# Patient Record
Sex: MALE | Race: WHITE | NOT HISPANIC OR LATINO | Employment: OTHER | URBAN - METROPOLITAN AREA
[De-identification: names, ages, dates, MRNs, and addresses within clinical notes are randomized per-mention and may not be internally consistent; named-entity substitution may affect disease eponyms.]

---

## 2022-11-04 ENCOUNTER — TELEPHONE (OUTPATIENT)
Dept: INTERNAL MEDICINE | Facility: CLINIC | Age: 47
End: 2022-11-04

## 2022-11-04 ENCOUNTER — OFFICE VISIT (OUTPATIENT)
Dept: INTERNAL MEDICINE | Facility: CLINIC | Age: 47
End: 2022-11-04
Payer: COMMERCIAL

## 2022-11-04 VITALS — SYSTOLIC BLOOD PRESSURE: 128 MMHG | DIASTOLIC BLOOD PRESSURE: 83 MMHG

## 2022-11-04 DIAGNOSIS — B35.6 TINEA CRURIS: ICD-10-CM

## 2022-11-04 DIAGNOSIS — R79.89 ELEVATED LFTS: Primary | ICD-10-CM

## 2022-11-04 DIAGNOSIS — I10 PRIMARY HYPERTENSION: Primary | ICD-10-CM

## 2022-11-04 DIAGNOSIS — K70.9 ALCOHOLIC LIVER DISEASE: ICD-10-CM

## 2022-11-04 DIAGNOSIS — D53.9 MACROCYTIC ANEMIA: Primary | ICD-10-CM

## 2022-11-04 PROCEDURE — 99203 OFFICE O/P NEW LOW 30 MIN: CPT | Mod: 95,,, | Performed by: INTERNAL MEDICINE

## 2022-11-04 PROCEDURE — 99203 PR OFFICE/OUTPT VISIT, NEW, LEVL III, 30-44 MIN: ICD-10-PCS | Mod: 95,,, | Performed by: INTERNAL MEDICINE

## 2022-11-04 RX ORDER — HYDROCHLOROTHIAZIDE 25 MG/1
25 TABLET ORAL DAILY
Qty: 90 TABLET | Refills: 3 | Status: SHIPPED | OUTPATIENT
Start: 2022-11-04 | End: 2023-02-02

## 2022-11-04 RX ORDER — LISINOPRIL 20 MG/1
20 TABLET ORAL DAILY
COMMUNITY
End: 2022-11-04 | Stop reason: SDUPTHER

## 2022-11-04 RX ORDER — KETOCONAZOLE 20 MG/G
CREAM TOPICAL DAILY
Qty: 60 G | Refills: 1 | Status: SHIPPED | OUTPATIENT
Start: 2022-11-04 | End: 2023-06-16 | Stop reason: SDUPTHER

## 2022-11-04 RX ORDER — METOPROLOL SUCCINATE 50 MG/1
50 TABLET, EXTENDED RELEASE ORAL DAILY
Qty: 90 TABLET | Refills: 3 | Status: SHIPPED | OUTPATIENT
Start: 2022-11-04 | End: 2023-06-09

## 2022-11-04 RX ORDER — HYDROCHLOROTHIAZIDE 25 MG/1
25 TABLET ORAL DAILY
COMMUNITY
End: 2022-11-04 | Stop reason: SDUPTHER

## 2022-11-04 RX ORDER — METOPROLOL TARTRATE 50 MG/1
50 TABLET ORAL 2 TIMES DAILY
COMMUNITY
End: 2022-11-04

## 2022-11-04 RX ORDER — LISINOPRIL 20 MG/1
20 TABLET ORAL DAILY
Qty: 90 TABLET | Refills: 3 | Status: SHIPPED | OUTPATIENT
Start: 2022-11-04 | End: 2023-06-16

## 2022-11-04 NOTE — TELEPHONE ENCOUNTER
Your electrolytes, kidney function, thyroid function are normal.  Cholesterol is elevated.  Regular exercise 5 days a week, 30 minutes a day will help as well as Mediterranean diet.  Also, you need to be on Lipitor 80 mg or Crestor 40 mg, because your LDL is 360.  This presents an increased risk of stroke and heart attack.  Your liver function tests are elevated, but from ureter a facility, I think this is down.  Let us recheck this in 2 weeks.     Mediterranean style diet:     Eat:  Olive oil, lean meats such as chicken and fish and only small servings of carbohydrates.   Olive oil and vinegar instead of low fat salad dressings.  Cook food in olive oil.  You can pan marie or saute fish and vegetables instead of boiling or baking.  Unsalted nuts for snacks. Walnuts, cashews, almonds, pecans and pistachios ( not peanuts). Try almond butter or cashew butter on toast or crackers.  Brown bread. You can also dip bread in olive oil and eat it as a snack or appetizer  Seasonal or frozen vegetables and fruits.      Avoid:  Saturated fats and deep fried foods. Also stay away from large servings of starches, sweets, desserts and sugary drinks (both sodas and fruit juices)

## 2022-11-04 NOTE — PROGRESS NOTES
Subjective:       Patient ID: Duncan Dominguez is a 47 y.o. male.    Chief Complaint: Hepatic Disease    HPI    The patient location is:  Home   The chief complaint leading to consultation is:  Alcoholic liver disease  Total time spent with patient: 15 minutes  Visit type: Virtual visit with synchronous audio and video  Each patient to whom he or she provides medical services by telemedicine is: (1) informed of the relationship between the physician and patient and the respective role of any other health care provider with respect to management of the patient; and (2) notified that he or she may decline to receive medical services by telemedicine and may withdraw from such care at any time.    47-year-old male here for evaluation of alcoholic liver disease.  He reports that he lost his wife and his drinking level escalated significantly in January.  In late January till about 6 weeks ago, he was drinking 750-1000 ml a day.  He went into a detox rehab 6 weeks ago.  Prior to this, he had a hard fall down cement steps.  The right side of his body took a hard hit.  He had labs done at the rehab. His bilirubin levels were at 4 and went to 12 and decreased to 10.4 and the last lab was 7.7.  The color in his eyes is better than it was.    He has a recurring issue with fungus in his groin.    Review of Systems   Constitutional:  Negative for activity change and unexpected weight change.   HENT:  Negative for hearing loss, rhinorrhea and trouble swallowing.    Eyes:  Negative for discharge and visual disturbance.   Respiratory:  Negative for chest tightness and wheezing.    Cardiovascular:  Negative for chest pain and palpitations.   Gastrointestinal:  Negative for blood in stool, constipation, diarrhea and vomiting.   Endocrine: Negative for polydipsia and polyuria.   Genitourinary:  Negative for difficulty urinating, hematuria and urgency.   Musculoskeletal:  Negative for arthralgias, joint swelling and neck pain.    Neurological:  Negative for weakness and headaches.   Psychiatric/Behavioral:  Negative for confusion and dysphoric mood.        Objective:      Physical Exam  Constitutional:       General: He is not in acute distress.     Appearance: He is well-developed. He is not diaphoretic.   HENT:      Head: Normocephalic and atraumatic.      Right Ear: External ear normal.      Left Ear: External ear normal.   Pulmonary:      Effort: Pulmonary effort is normal.   Musculoskeletal:      Cervical back: Normal range of motion.       Assessment:       1. Primary hypertension  - CBC Auto Differential; Future  - Comprehensive Metabolic Panel; Future  - TSH; Future  - Lipid Panel; Future    2. Alcoholic liver disease  - CBC Auto Differential; Future  - Comprehensive Metabolic Panel; Future  - TSH; Future  - Lipid Panel; Future  - US Abdomen Limited; Future  - PROTIME-INR; Future  - GAMMA GT; Future    Plan:       1/2.  Check CBC, CMP, TSH, lipids, GGT, PT, ultrasound abdomen.    [address] [facility]  [place]endoza [id number]

## 2022-11-04 NOTE — TELEPHONE ENCOUNTER
Your blood counts are overall normal, but the size of the blood cells is enlarged.  I am going to order a B12 and folic acid.

## 2022-11-05 ENCOUNTER — PATIENT MESSAGE (OUTPATIENT)
Dept: INTERNAL MEDICINE | Facility: CLINIC | Age: 47
End: 2022-11-05

## 2022-11-07 ENCOUNTER — PATIENT MESSAGE (OUTPATIENT)
Dept: ADMINISTRATIVE | Facility: HOSPITAL | Age: 47
End: 2022-11-07

## 2023-01-05 PROBLEM — K70.30 ALCOHOLIC CIRRHOSIS: Status: ACTIVE | Noted: 2023-01-05

## 2023-01-05 PROBLEM — K85.20 ACUTE ALCOHOLIC PANCREATITIS: Status: ACTIVE | Noted: 2023-01-05

## 2023-01-05 PROBLEM — I10 HTN (HYPERTENSION): Status: ACTIVE | Noted: 2023-01-05

## 2023-01-05 PROBLEM — R80.9 PROTEINURIA: Status: ACTIVE | Noted: 2023-01-05

## 2023-01-05 PROBLEM — K75.81 STEATOHEPATITIS: Status: ACTIVE | Noted: 2023-01-05

## 2023-01-05 PROBLEM — E87.29 ALCOHOLIC KETOACIDOSIS: Status: ACTIVE | Noted: 2023-01-05

## 2023-01-05 PROBLEM — K92.0 HEMATEMESIS OF FRESH BLOOD: Status: ACTIVE | Noted: 2023-01-05

## 2023-01-06 PROBLEM — F10.931 ALCOHOL WITHDRAWAL SYNDROME, WITH DELIRIUM: Status: ACTIVE | Noted: 2023-01-06

## 2023-01-13 PROBLEM — F10.20 ALCOHOL USE DISORDER, SEVERE, DEPENDENCE: Status: ACTIVE | Noted: 2023-01-13

## 2023-01-13 PROBLEM — F33.1 MDD (MAJOR DEPRESSIVE DISORDER), RECURRENT EPISODE, MODERATE: Status: ACTIVE | Noted: 2023-01-13

## 2023-01-13 PROBLEM — G47.00 INSOMNIA: Status: ACTIVE | Noted: 2023-01-13

## 2023-01-14 PROBLEM — R73.9 HYPERGLYCEMIA: Status: ACTIVE | Noted: 2023-01-14

## 2023-01-14 PROBLEM — G47.00 INSOMNIA: Status: RESOLVED | Noted: 2023-01-13 | Resolved: 2023-01-14

## 2023-01-26 ENCOUNTER — OFFICE VISIT (OUTPATIENT)
Dept: PRIMARY CARE CLINIC | Facility: CLINIC | Age: 48
End: 2023-01-26
Payer: COMMERCIAL

## 2023-01-26 VITALS
SYSTOLIC BLOOD PRESSURE: 142 MMHG | BODY MASS INDEX: 29.96 KG/M2 | DIASTOLIC BLOOD PRESSURE: 88 MMHG | RESPIRATION RATE: 17 BRPM | TEMPERATURE: 98 F | OXYGEN SATURATION: 99 % | HEART RATE: 78 BPM | WEIGHT: 246.06 LBS | HEIGHT: 76 IN

## 2023-01-26 DIAGNOSIS — K85.20 ALCOHOL-INDUCED ACUTE PANCREATITIS WITHOUT INFECTION OR NECROSIS: ICD-10-CM

## 2023-01-26 DIAGNOSIS — R73.9 HYPERGLYCEMIA: ICD-10-CM

## 2023-01-26 DIAGNOSIS — K80.21 CALCULUS OF GALLBLADDER WITH BILIARY OBSTRUCTION BUT WITHOUT CHOLECYSTITIS: ICD-10-CM

## 2023-01-26 DIAGNOSIS — J18.9 PNEUMONIA OF LEFT LOWER LOBE DUE TO INFECTIOUS ORGANISM: ICD-10-CM

## 2023-01-26 DIAGNOSIS — R10.13 ABDOMINAL PAIN, EPIGASTRIC: ICD-10-CM

## 2023-01-26 DIAGNOSIS — K80.20 CALCULUS OF GALLBLADDER WITHOUT CHOLECYSTITIS WITHOUT OBSTRUCTION: ICD-10-CM

## 2023-01-26 DIAGNOSIS — K70.30 ALCOHOLIC CIRRHOSIS OF LIVER WITHOUT ASCITES: ICD-10-CM

## 2023-01-26 DIAGNOSIS — K85.90 ACUTE PANCREATITIS, UNSPECIFIED COMPLICATION STATUS, UNSPECIFIED PANCREATITIS TYPE: Primary | ICD-10-CM

## 2023-01-26 PROCEDURE — 3044F HG A1C LEVEL LT 7.0%: CPT | Mod: CPTII,S$GLB,, | Performed by: INTERNAL MEDICINE

## 2023-01-26 PROCEDURE — 1160F RVW MEDS BY RX/DR IN RCRD: CPT | Mod: CPTII,S$GLB,, | Performed by: INTERNAL MEDICINE

## 2023-01-26 PROCEDURE — 1160F PR REVIEW ALL MEDS BY PRESCRIBER/CLIN PHARMACIST DOCUMENTED: ICD-10-PCS | Mod: CPTII,S$GLB,, | Performed by: INTERNAL MEDICINE

## 2023-01-26 PROCEDURE — 3044F PR MOST RECENT HEMOGLOBIN A1C LEVEL <7.0%: ICD-10-PCS | Mod: CPTII,S$GLB,, | Performed by: INTERNAL MEDICINE

## 2023-01-26 PROCEDURE — 99999 PR PBB SHADOW E&M-EST. PATIENT-LVL V: CPT | Mod: PBBFAC,,, | Performed by: INTERNAL MEDICINE

## 2023-01-26 PROCEDURE — 1111F DSCHRG MED/CURRENT MED MERGE: CPT | Mod: CPTII,S$GLB,, | Performed by: INTERNAL MEDICINE

## 2023-01-26 PROCEDURE — 1111F PR DISCHARGE MEDS RECONCILED W/ CURRENT OUTPATIENT MED LIST: ICD-10-PCS | Mod: CPTII,S$GLB,, | Performed by: INTERNAL MEDICINE

## 2023-01-26 PROCEDURE — 3077F PR MOST RECENT SYSTOLIC BLOOD PRESSURE >= 140 MM HG: ICD-10-PCS | Mod: CPTII,S$GLB,, | Performed by: INTERNAL MEDICINE

## 2023-01-26 PROCEDURE — 3008F BODY MASS INDEX DOCD: CPT | Mod: CPTII,S$GLB,, | Performed by: INTERNAL MEDICINE

## 2023-01-26 PROCEDURE — 1159F PR MEDICATION LIST DOCUMENTED IN MEDICAL RECORD: ICD-10-PCS | Mod: CPTII,S$GLB,, | Performed by: INTERNAL MEDICINE

## 2023-01-26 PROCEDURE — 99214 OFFICE O/P EST MOD 30 MIN: CPT | Mod: S$GLB,,, | Performed by: INTERNAL MEDICINE

## 2023-01-26 PROCEDURE — 4010F PR ACE/ARB THEARPY RXD/TAKEN: ICD-10-PCS | Mod: CPTII,S$GLB,, | Performed by: INTERNAL MEDICINE

## 2023-01-26 PROCEDURE — 3079F DIAST BP 80-89 MM HG: CPT | Mod: CPTII,S$GLB,, | Performed by: INTERNAL MEDICINE

## 2023-01-26 PROCEDURE — 99999 PR PBB SHADOW E&M-EST. PATIENT-LVL V: ICD-10-PCS | Mod: PBBFAC,,, | Performed by: INTERNAL MEDICINE

## 2023-01-26 PROCEDURE — 99214 PR OFFICE/OUTPT VISIT, EST, LEVL IV, 30-39 MIN: ICD-10-PCS | Mod: S$GLB,,, | Performed by: INTERNAL MEDICINE

## 2023-01-26 PROCEDURE — 3079F PR MOST RECENT DIASTOLIC BLOOD PRESSURE 80-89 MM HG: ICD-10-PCS | Mod: CPTII,S$GLB,, | Performed by: INTERNAL MEDICINE

## 2023-01-26 PROCEDURE — 4010F ACE/ARB THERAPY RXD/TAKEN: CPT | Mod: CPTII,S$GLB,, | Performed by: INTERNAL MEDICINE

## 2023-01-26 PROCEDURE — 3008F PR BODY MASS INDEX (BMI) DOCUMENTED: ICD-10-PCS | Mod: CPTII,S$GLB,, | Performed by: INTERNAL MEDICINE

## 2023-01-26 PROCEDURE — 3077F SYST BP >= 140 MM HG: CPT | Mod: CPTII,S$GLB,, | Performed by: INTERNAL MEDICINE

## 2023-01-26 PROCEDURE — 1159F MED LIST DOCD IN RCRD: CPT | Mod: CPTII,S$GLB,, | Performed by: INTERNAL MEDICINE

## 2023-01-26 RX ORDER — INSULIN LISPRO 100 [IU]/ML
INJECTION, SOLUTION INTRAVENOUS; SUBCUTANEOUS
Qty: 9 ML | Refills: 3 | Status: SHIPPED | OUTPATIENT
Start: 2023-01-26 | End: 2023-06-16

## 2023-01-26 RX ORDER — NAPROXEN SODIUM 220 MG
TABLET ORAL
Qty: 100 EACH | Refills: 3 | Status: ON HOLD | OUTPATIENT
Start: 2023-01-26 | End: 2023-11-06 | Stop reason: CLARIF

## 2023-01-26 RX ORDER — PANTOPRAZOLE SODIUM 40 MG/1
40 TABLET, DELAYED RELEASE ORAL DAILY
Qty: 30 TABLET | Refills: 2 | Status: SHIPPED | OUTPATIENT
Start: 2023-01-26 | End: 2023-06-16

## 2023-01-27 ENCOUNTER — TELEPHONE (OUTPATIENT)
Dept: ENDOSCOPY | Facility: HOSPITAL | Age: 48
End: 2023-01-27
Payer: COMMERCIAL

## 2023-01-27 NOTE — PROGRESS NOTES
Subjective:       Patient ID: Alberto Song is a 47 y.o. male.    Chief Complaint: Hospital Follow Up, Medication Refill (Needs Humalog instead of Novolog; and needles for insulin pen), and Chest Pain (Lower chest/abdominal pain; suspects from antibiotic)    HPI  patient finished today for hospital follow-up he was admitted to the hospital due to intractable nausea vomiting hematemesis and ataxia he went to ER file to have severe hypokalemia metabolic acidosis hand very high lipase greater than 1000 patient has history of alcohol use he quit 2 day before his abdominal pain and nausea vomiting started he was treated with IV fluid IV antibiotic and medication for nausea vomiting he had CT scan of the abdomen and abdominal ultrasound that show acute pancreatitis with inflammation swelling of the pancreas but no pseudocyst no bleeding and no bladder with cholecystitis like higher cyst single stone at the neck of the bladder with dilated gallbladder MRCP appear to be okay his lipase return to almost normal patient tolerated p.o. diet he also found to have questionable lower lobe pneumonia the left lower lung he put on Levaquin p.o. when he was discharged he has been doing fairly well home able to tolerate p.o. diet a few day he had been having abdominal pain after he take medication including Levaquin lisinopril metoprolol and the pain then resolved by itself is start in the epigastric area and radiated into the lower abdomen no nausea vomiting no melanotic stool no diarrhea .  His still have some coughing but no fever no short of breath patient is not a smoker  Review of Systems    Objective:      Physical Exam  Vitals and nursing note reviewed.   Constitutional:       General: He is not in acute distress.     Appearance: He is well-developed.   HENT:      Head: Normocephalic and atraumatic.      Right Ear: External ear normal.      Left Ear: External ear normal.      Nose: Nose normal.      Mouth/Throat:      Pharynx:  No oropharyngeal exudate.   Eyes:      Extraocular Movements: Extraocular movements intact.      Conjunctiva/sclera: Conjunctivae normal.      Pupils: Pupils are equal, round, and reactive to light.   Neck:      Thyroid: No thyromegaly.   Cardiovascular:      Rate and Rhythm: Normal rate and regular rhythm.      Heart sounds: Normal heart sounds. No murmur heard.    No friction rub. No gallop.   Pulmonary:      Effort: Pulmonary effort is normal. No respiratory distress.      Breath sounds: Normal breath sounds. No wheezing.   Abdominal:      General: Bowel sounds are normal. There is no distension.      Palpations: Abdomen is soft.      Tenderness: There is no abdominal tenderness.   Musculoskeletal:         General: No tenderness or deformity. Normal range of motion.      Cervical back: Normal range of motion and neck supple.   Lymphadenopathy:      Cervical: No cervical adenopathy.   Skin:     General: Skin is warm and dry.      Findings: No erythema or rash.   Neurological:      Mental Status: He is alert and oriented to person, place, and time.   Psychiatric:         Mood and Affect: Mood normal.         Thought Content: Thought content normal.         Judgment: Judgment normal.       Assessment:       1. Acute pancreatitis, unspecified complication status, unspecified pancreatitis type    2. Abdominal pain, epigastric    3. Alcoholic cirrhosis of liver without ascites    4. Alcohol-induced acute pancreatitis without infection or necrosis    5. Hyperglycemia    6. Calculus of gallbladder with biliary obstruction but without cholecystitis    7. Pneumonia of left lower lobe due to infectious organism    8. Calculus of gallbladder without cholecystitis without obstruction          Plan:       Acute pancreatitis, unspecified complication status, unspecified pancreatitis type  -     Ambulatory referral/consult to Gastroenterology; Future; Expected date: 01/27/2023  -     Lipase; Future; Expected date: 01/26/2023  -   "   AMYLASE; Future; Expected date: 01/26/2023  -     X-Ray Abdomen Flat And Erect; Future; Expected date: 01/26/2023    Abdominal pain, epigastric  Comments:  Could be from gallstones versus medication versus gastritis  Orders:  -     X-Ray Abdomen Flat And Erect; Future; Expected date: 01/26/2023  -     Urinalysis; Future; Expected date: 01/26/2023  -     pantoprazole (PROTONIX) 40 MG tablet; Take 1 tablet (40 mg total) by mouth once daily.  Dispense: 30 tablet; Refill: 2    Alcoholic cirrhosis of liver without ascites    Alcohol-induced acute pancreatitis without infection or necrosis  Comments:  Continue to abstinent from alcohol    Hyperglycemia  -     insulin detemir U-100 (LEVEMIR FLEXTOUCH) 100 unit/mL (3 mL) SubQ InPn pen; Inject 20 Units into the skin once daily.  Dispense: 6 mL; Refill: 11  -     insulin lispro (HUMALOG KWIKPEN INSULIN) 100 unit/mL pen; 8 units TID before meal  Dispense: 9 mL; Refill: 3  -     insulin lispro (HUMALOG KWIKPEN INSULIN) 100 unit/mL pen; 1-10 units before meal and at night  Dispense: 9 mL; Refill: 3  -     insulin syringe-needle U-100 0.5 mL 31 gauge x 5/16" Syrg; Insulin infection 5 x a day  Dispense: 100 each; Refill: 3    Calculus of gallbladder with biliary obstruction but without cholecystitis  -     Ambulatory referral/consult to Gastroenterology; Future; Expected date: 01/27/2023    Pneumonia of left lower lobe due to infectious organism  -     CBC Auto Differential; Future; Expected date: 01/26/2023  -     Comprehensive Metabolic Panel; Future; Expected date: 01/26/2023  -     Procalcitonin; Future; Expected date: 01/26/2023    Calculus of gallbladder without cholecystitis without obstruction  Comments:  If not fasting will need surgical intervention        Medication List with Changes/Refills   New Medications    INSULIN LISPRO (HUMALOG KWIKPEN INSULIN) 100 UNIT/ML PEN    8 units TID before meal    INSULIN LISPRO (HUMALOG KWIKPEN INSULIN) 100 UNIT/ML PEN    1-10 units " "before meal and at night    INSULIN SYRINGE-NEEDLE U-100 0.5 ML 31 GAUGE X 5/16" SYRG    Insulin infection 5 x a day    PANTOPRAZOLE (PROTONIX) 40 MG TABLET    Take 1 tablet (40 mg total) by mouth once daily.   Current Medications    BLOOD SUGAR DIAGNOSTIC STRP    1 strip by Misc.(Non-Drug; Combo Route) route 2 (two) times daily.    BLOOD-GLUCOSE METER KIT    Use as instructed    LEVOFLOXACIN (LEVAQUIN) 500 MG TABLET    Take 1 tablet (500 mg total) by mouth once daily.    LISINOPRIL (PRINIVIL,ZESTRIL) 20 MG TABLET    Take 20 mg by mouth.    METOPROLOL SUCCINATE (TOPROL-XL) 25 MG 24 HR TABLET    Take 25 mg by mouth.    SERTRALINE (ZOLOFT) 25 MG TABLET    Take 1 tablet (25 mg total) by mouth once daily.   Changed and/or Refilled Medications    Modified Medication Previous Medication    INSULIN DETEMIR U-100 (LEVEMIR FLEXTOUCH) 100 UNIT/ML (3 ML) SUBQ INPN PEN insulin detemir U-100 (LEVEMIR FLEXTOUCH) 100 unit/mL (3 mL) SubQ InPn pen       Inject 20 Units into the skin once daily.    Inject 20 Units into the skin once daily.   Discontinued Medications    INSULIN ASPART U-100 (NOVOLOG) 100 UNIT/ML (3 ML) INPN PEN    Inject 1-10 Units into the skin before meals and at bedtime as needed (Hyperglycemia).    INSULIN ASPART U-100 (NOVOLOG) 100 UNIT/ML (3 ML) INPN PEN    Inject 8 Units into the skin 3 (three) times daily.        "

## 2023-01-27 NOTE — TELEPHONE ENCOUNTER
----- Message from Jordi Hernandez MD sent at 1/27/2023  7:41 AM CST -----  yes  ----- Message -----  From: Keyanna Kirby MA  Sent: 1/26/2023   8:45 PM CST  To: Jordi Hernandez MD    Should he just see General Surg?  ----- Message -----  From: Sara Mcleod  Sent: 1/26/2023   3:51 PM CST  To: [facility]astro Clinical Staff    Acute pancreatitis, unspecified complication status, unspecified pancreatitis ty...  Calculus of gallbladder with biliary obstruction but without cholecystitis [[id number]....plrase call patient back to schedule appointment

## 2023-01-30 ENCOUNTER — OFFICE VISIT (OUTPATIENT)
Dept: SURGERY | Facility: CLINIC | Age: 48
End: 2023-01-30
Payer: COMMERCIAL

## 2023-01-30 VITALS
WEIGHT: 241.94 LBS | HEART RATE: 106 BPM | HEIGHT: 76 IN | SYSTOLIC BLOOD PRESSURE: 137 MMHG | BODY MASS INDEX: 29.46 KG/M2 | DIASTOLIC BLOOD PRESSURE: 90 MMHG | RESPIRATION RATE: 16 BRPM

## 2023-01-30 DIAGNOSIS — K80.20 CALCULUS OF GALLBLADDER WITHOUT CHOLECYSTITIS WITHOUT OBSTRUCTION: Primary | ICD-10-CM

## 2023-01-30 PROCEDURE — 3008F PR BODY MASS INDEX (BMI) DOCUMENTED: ICD-10-PCS | Mod: CPTII,S$GLB,, | Performed by: SURGERY

## 2023-01-30 PROCEDURE — 3080F DIAST BP >= 90 MM HG: CPT | Mod: CPTII,S$GLB,, | Performed by: SURGERY

## 2023-01-30 PROCEDURE — 99999 PR PBB SHADOW E&M-EST. PATIENT-LVL III: CPT | Mod: PBBFAC,,, | Performed by: SURGERY

## 2023-01-30 PROCEDURE — 4010F ACE/ARB THERAPY RXD/TAKEN: CPT | Mod: CPTII,S$GLB,, | Performed by: SURGERY

## 2023-01-30 PROCEDURE — 3075F SYST BP GE 130 - 139MM HG: CPT | Mod: CPTII,S$GLB,, | Performed by: SURGERY

## 2023-01-30 PROCEDURE — 99215 PR OFFICE/OUTPT VISIT, EST, LEVL V, 40-54 MIN: ICD-10-PCS | Mod: S$GLB,,, | Performed by: SURGERY

## 2023-01-30 PROCEDURE — 3075F PR MOST RECENT SYSTOLIC BLOOD PRESS GE 130-139MM HG: ICD-10-PCS | Mod: CPTII,S$GLB,, | Performed by: SURGERY

## 2023-01-30 PROCEDURE — 4010F PR ACE/ARB THEARPY RXD/TAKEN: ICD-10-PCS | Mod: CPTII,S$GLB,, | Performed by: SURGERY

## 2023-01-30 PROCEDURE — 1111F PR DISCHARGE MEDS RECONCILED W/ CURRENT OUTPATIENT MED LIST: ICD-10-PCS | Mod: CPTII,S$GLB,, | Performed by: SURGERY

## 2023-01-30 PROCEDURE — 1159F MED LIST DOCD IN RCRD: CPT | Mod: CPTII,S$GLB,, | Performed by: SURGERY

## 2023-01-30 PROCEDURE — 3044F HG A1C LEVEL LT 7.0%: CPT | Mod: CPTII,S$GLB,, | Performed by: SURGERY

## 2023-01-30 PROCEDURE — 3044F PR MOST RECENT HEMOGLOBIN A1C LEVEL <7.0%: ICD-10-PCS | Mod: CPTII,S$GLB,, | Performed by: SURGERY

## 2023-01-30 PROCEDURE — 99999 PR PBB SHADOW E&M-EST. PATIENT-LVL III: ICD-10-PCS | Mod: PBBFAC,,, | Performed by: SURGERY

## 2023-01-30 PROCEDURE — 1159F PR MEDICATION LIST DOCUMENTED IN MEDICAL RECORD: ICD-10-PCS | Mod: CPTII,S$GLB,, | Performed by: SURGERY

## 2023-01-30 PROCEDURE — 3008F BODY MASS INDEX DOCD: CPT | Mod: CPTII,S$GLB,, | Performed by: SURGERY

## 2023-01-30 PROCEDURE — 3080F PR MOST RECENT DIASTOLIC BLOOD PRESSURE >= 90 MM HG: ICD-10-PCS | Mod: CPTII,S$GLB,, | Performed by: SURGERY

## 2023-01-30 PROCEDURE — 99215 OFFICE O/P EST HI 40 MIN: CPT | Mod: S$GLB,,, | Performed by: SURGERY

## 2023-01-30 PROCEDURE — 1111F DSCHRG MED/CURRENT MED MERGE: CPT | Mod: CPTII,S$GLB,, | Performed by: SURGERY

## 2023-01-31 PROBLEM — K70.9 ALCOHOLIC LIVER DISEASE: Status: ACTIVE | Noted: 2023-01-31

## 2023-02-01 ENCOUNTER — PATIENT MESSAGE (OUTPATIENT)
Dept: SURGERY | Facility: CLINIC | Age: 48
End: 2023-02-01
Payer: COMMERCIAL

## 2023-02-01 ENCOUNTER — TELEPHONE (OUTPATIENT)
Dept: SURGERY | Facility: CLINIC | Age: 48
End: 2023-02-01
Payer: COMMERCIAL

## 2023-02-01 NOTE — TELEPHONE ENCOUNTER
----- Message from Carol Sánchez sent at 2/1/2023  2:14 PM CST -----  Regarding: Pt Call Back  Pt called stating that he would like to speak with someone in the office in regards to scheduling surgery       Contact Alberto [phone]

## 2023-02-02 ENCOUNTER — PATIENT MESSAGE (OUTPATIENT)
Dept: SURGERY | Facility: CLINIC | Age: 48
End: 2023-02-02
Payer: COMMERCIAL

## 2023-02-03 RX ORDER — ENOXAPARIN SODIUM 100 MG/ML
40 INJECTION SUBCUTANEOUS
Status: CANCELLED | OUTPATIENT
Start: 2023-02-08

## 2023-02-03 NOTE — PROGRESS NOTES
"History & Physical    SUBJECTIVE:     History of Present Illness:  7-year-old male with history of chronic alcoholism who presents with hospital follow-up from an episode of pancreatitis 3 weeks ago likely caused by drinking alcohol.    He was also found to have a large stone at the neck of the gallbladder.    Patient states he feels better now and has stopped drinking over the past few weeks.        No nausea, vomiting and epigastric pain over the last few weeks  Discussed with patient surgical intervention with laparoscopic cholecystectomy with him and his father.    Went over in detail risks and benefits of surgical intervention versus observation and need to cut down his drinking.    Expressed understanding.          Review of patient's allergies indicates:  No Known Allergies    Current Outpatient Medications   Medication Sig Dispense Refill    blood sugar diagnostic Strp 1 strip by Misc.(Non-Drug; Combo Route) route 2 (two) times daily. 100 strip 0    blood-glucose meter kit Use as instructed 1 each 0    insulin detemir U-100 (LEVEMIR FLEXTOUCH) 100 unit/mL (3 mL) SubQ InPn pen Inject 20 Units into the skin once daily. 6 mL 11    insulin lispro (HUMALOG KWIKPEN INSULIN) 100 unit/mL pen 1-10 units before meal and at night 9 mL 3    insulin syringe-needle U-100 0.5 mL 31 gauge x 5/16" Syrg Insulin infection 5 x a day 100 each 3    lisinopriL (PRINIVIL,ZESTRIL) 20 MG tablet Take 20 mg by mouth.      metoprolol succinate (TOPROL-XL) 25 MG 24 hr tablet Take 25 mg by mouth.      insulin lispro (HUMALOG KWIKPEN INSULIN) 100 unit/mL pen 8 units TID before meal (Patient not taking: Reported on 1/30/2023) 9 mL 3    levoFLOXacin (LEVAQUIN) 500 MG tablet Take 1 tablet (500 mg total) by mouth once daily. (Patient not taking: Reported on 1/30/2023) 12 tablet 0    pantoprazole (PROTONIX) 40 MG tablet Take 1 tablet (40 mg total) by mouth once daily. (Patient not taking: Reported on 1/30/2023) 30 tablet 2    sertraline " "(ZOLOFT) 25 MG tablet Take 1 tablet (25 mg total) by mouth once daily. (Patient not taking: Reported on 1/30/2023) 30 tablet 11     No current facility-administered medications for this visit.       Past Medical History:   Diagnosis Date    Asthma     Hypertension      No past surgical history on file.  Family History   Problem Relation Age of Onset    Cancer Mother      Social History     Tobacco Use    Smoking status: Every Day     Packs/day: 1.00     Types: Cigarettes    Smokeless tobacco: Never   Substance Use Topics    Alcohol use: Not Currently     Comment: 750    Drug use: Never        Review of Systems:  Review of Systems   Constitutional:  Negative for appetite change, fatigue, fever and unexpected weight change.   HENT:  Negative for sore throat and trouble swallowing.    Eyes: Negative.    Respiratory:  Negative for cough, shortness of breath and wheezing.    Cardiovascular:  Negative for chest pain and leg swelling.   Gastrointestinal:  Negative for abdominal distention, abdominal pain, blood in stool, constipation, diarrhea, nausea and vomiting.   Endocrine: Negative.    Genitourinary: Negative.    Musculoskeletal:  Negative for back pain.   Skin: Negative.  Negative for rash.   Allergic/Immunologic: Negative.    Neurological: Negative.    Hematological: Negative.    Psychiatric/Behavioral:  Negative for confusion.      OBJECTIVE:     Vital Signs (Most Recent)  Pulse: 106 (01/30/23 1126)  Resp: 16 (01/30/23 1126)  BP: (!) 137/90 (01/30/23 1126)  6' 4" (1.93 m)  109.8 kg (241 lb 15.3 oz)     Physical Exam:  Physical Exam  Vitals and nursing note reviewed.   Constitutional:       Appearance: He is well-developed.   HENT:      Head: Normocephalic and atraumatic.   Cardiovascular:      Rate and Rhythm: Normal rate.      Heart sounds: Normal heart sounds.   Pulmonary:      Effort: Pulmonary effort is normal.   Abdominal:      General: Bowel sounds are normal. There is no distension.      Palpations: Abdomen " is soft.      Tenderness: There is no abdominal tenderness.   Musculoskeletal:         General: Normal range of motion.      Cervical back: Normal range of motion.   Skin:     General: Skin is warm and dry.      Capillary Refill: Capillary refill takes less than 2 seconds.   Neurological:      Mental Status: He is alert and oriented to person, place, and time.   Psychiatric:         Behavior: Behavior normal.     Laboratory  CBC: Reviewed  CMP: Reviewed    Diagnostic Results:  US: Reviewed  CT: Reviewed  Cholelithiasis, pancreatitis    ASSESSMENT/PLAN:     47-year-old male with symptomatic cholelithiasis, recent episode of pancreatitis    PLAN:Plan   Recommend laparoscopic cholecystectomy      I described the nature of the patient's pathology and the spectrum of disease presentation ranging from mild discomfort to acute cholecystitis or gallstone/necrotizing pancreatitis. Non-operative therapy was discussed, but the patient would require a strict non-fat diet and still this would not guarantee resolution of symptoms. I think surgery is in the patient's best interest given the severity of symptoms, and he is agreeable. I described the risks of the surgery including but not limited to bleeding, infection, wound complications, injury to local structures including the common bile duct, bile leak, persistent abd pain, and potential need for further interventions. The patient demonstrated understanding of these risks and asked appropriate questions. A consent form was signed today, and the patient will be booked for surgery as laparoscopic cholecystectomy, possible open, possible intraoperative cholangiogram.

## 2023-02-06 DIAGNOSIS — E11.9 NEW ONSET TYPE 2 DIABETES MELLITUS: Primary | ICD-10-CM

## 2023-02-06 RX ORDER — PEN NEEDLE, DIABETIC 29 G X1/2"
1 NEEDLE, DISPOSABLE MISCELLANEOUS 4 TIMES DAILY
Qty: 100 EACH | Refills: 20 | Status: ON HOLD | OUTPATIENT
Start: 2023-02-06 | End: 2023-11-06 | Stop reason: CLARIF

## 2023-02-08 PROBLEM — T88.4XXA HARD TO INTUBATE: Status: ACTIVE | Noted: 2023-02-08

## 2023-02-14 ENCOUNTER — OFFICE VISIT (OUTPATIENT)
Dept: ENDOCRINOLOGY | Facility: CLINIC | Age: 48
End: 2023-02-14
Payer: COMMERCIAL

## 2023-02-14 ENCOUNTER — PATIENT MESSAGE (OUTPATIENT)
Dept: PRIMARY CARE CLINIC | Facility: CLINIC | Age: 48
End: 2023-02-14
Payer: COMMERCIAL

## 2023-02-14 DIAGNOSIS — R73.9 HYPERGLYCEMIA: ICD-10-CM

## 2023-02-14 DIAGNOSIS — K85.20 ALCOHOL-INDUCED ACUTE PANCREATITIS WITHOUT INFECTION OR NECROSIS: ICD-10-CM

## 2023-02-14 PROCEDURE — 3044F HG A1C LEVEL LT 7.0%: CPT | Mod: CPTII,95,, | Performed by: INTERNAL MEDICINE

## 2023-02-14 PROCEDURE — 4010F ACE/ARB THERAPY RXD/TAKEN: CPT | Mod: CPTII,95,, | Performed by: INTERNAL MEDICINE

## 2023-02-14 PROCEDURE — 99204 PR OFFICE/OUTPT VISIT, NEW, LEVL IV, 45-59 MIN: ICD-10-PCS | Mod: 95,,, | Performed by: INTERNAL MEDICINE

## 2023-02-14 PROCEDURE — 3044F PR MOST RECENT HEMOGLOBIN A1C LEVEL <7.0%: ICD-10-PCS | Mod: CPTII,95,, | Performed by: INTERNAL MEDICINE

## 2023-02-14 PROCEDURE — 4010F PR ACE/ARB THEARPY RXD/TAKEN: ICD-10-PCS | Mod: CPTII,95,, | Performed by: INTERNAL MEDICINE

## 2023-02-14 PROCEDURE — 99204 OFFICE O/P NEW MOD 45 MIN: CPT | Mod: 95,,, | Performed by: INTERNAL MEDICINE

## 2023-02-14 RX ORDER — METOPROLOL SUCCINATE 25 MG/1
25 TABLET, EXTENDED RELEASE ORAL DAILY
Qty: 90 TABLET | Refills: 1 | Status: SHIPPED | OUTPATIENT
Start: 2023-02-14 | End: 2023-06-08 | Stop reason: SDUPTHER

## 2023-02-14 RX ORDER — LISINOPRIL 20 MG/1
20 TABLET ORAL DAILY
Qty: 90 TABLET | Refills: 1 | Status: SHIPPED | OUTPATIENT
Start: 2023-02-14 | End: 2023-06-08 | Stop reason: SDUPTHER

## 2023-02-14 NOTE — PROGRESS NOTES
Subjective:      Patient ID: Alberto Song is a 47 y.o. male.    Chief Complaint:  No chief complaint on file.      History of Present Illness    Recent discharge from [facility] for acute alcoholic/gallstone pancreatitis underwent cholecystectomy last week.   Discharged on [facility] and is here for a follow up visit.     Since his d/c:  1) avoiding sodas, carbs and sugars, avoiding fruits as well  2) eating protein and vegetables only   3) weight loss 40 lbs   4) improved in blood sugars based on diet: from 300 --> 120 - 170  5) checking blood sugars before and after meals.     Current regimen:   Levemir 11 units in the morning once daily.   Not taking any meal time insulin     Did not send log.     Denies hypoglycemic symptoms.   Lowest blood sugar is 116.     PMHx:  Hypertension   Cholecystectomy  Hepatomegaly with steatosis     Review of Systems  Denies any recent GI symptoms mild tenderness at incision sites  Feeling really well after surgery.   Denies fever or chills    Objective:   Physical Exam  There were no vitals filed for this visit.    BP Readings from Last 3 Encounters:   02/08/23 138/75   01/30/23 (!) 137/90   01/26/23 (!) 142/88     Wt Readings from Last 1 Encounters:   02/08/23 0901 108 kg (238 lb 3.3 oz)   02/06/23 1002 108.9 kg (240 lb)         There is no height or weight on file to calculate BMI.    Lab Review:   Lab Results   Component Value Date    HGBA1C 5.7 (H) 01/10/2023     No results found for: CHOL, HDL, LDLCALC, TRIG, CHOLHDL  Lab Results   Component Value Date     01/26/2023    K 3.6 01/26/2023     01/26/2023    CO2 23 01/26/2023     (H) 01/26/2023    BUN 7 01/26/2023    CREATININE 0.9 01/26/2023    CALCIUM 9.0 01/26/2023    PROT 6.9 01/26/2023    ALBUMIN 3.4 (L) 01/26/2023    BILITOT 0.5 01/26/2023    ALKPHOS 119 01/26/2023    AST 21 01/26/2023    ALT 27 01/26/2023    ANIONGAP 13 01/26/2023     CT of abdomen 2023   right adrenal gland appear unremarkable.   Hyperplastic changes of the left adrenal gland medial limb and body are noted.    Assessment and Plan     Hyperglycemia  In acute setting   Currently on Levemir 11 units daily   Strict diet without carbs     Check cpeptide and glucose levels next week     Acute alcoholic pancreatitis  In the acute setting most likely the cause of hyperglycemia   On day of admission A1C is 5.7%      The patient location is: home  The chief complaint leading to consultation is: hyperglycemia    Visit type: audiovisual    Face to Face time with patient: 20 minutes of total time spent othe encounter, which includes face to face time and non-face to face time preparing to see the patient (eg, review of tests), Obtaining and/or reviewing separately obtained history, Documenting clinical information in the electronic or other health record, Independently interpreting results (not separately reported) and communicating results to the patient/family/caregiver, or Care coordination (not separately reported).         Each patient to whom he or she provides medical services by telemedicine is:  (1) informed of the relationship between the physician and patient and the respective role of any other health care provider with respect to management of the patient; and (2) notified that he or she may decline to receive medical services by telemedicine and may withdraw from such care at any time.    Notes:

## 2023-02-14 NOTE — ASSESSMENT & PLAN NOTE
In acute setting   Currently on Levemir 11 units daily   Strict diet without carbs     Check cpeptide and glucose levels next week

## 2023-02-16 ENCOUNTER — PATIENT MESSAGE (OUTPATIENT)
Dept: ENDOCRINOLOGY | Facility: CLINIC | Age: 48
End: 2023-02-16
Payer: COMMERCIAL

## 2023-02-24 ENCOUNTER — PATIENT MESSAGE (OUTPATIENT)
Dept: ENDOCRINOLOGY | Facility: CLINIC | Age: 48
End: 2023-02-24
Payer: COMMERCIAL

## 2023-03-20 DIAGNOSIS — R73.9 HYPERGLYCEMIA: Primary | ICD-10-CM

## 2023-04-09 ENCOUNTER — PATIENT MESSAGE (OUTPATIENT)
Dept: PRIMARY CARE CLINIC | Facility: CLINIC | Age: 48
End: 2023-04-09
Payer: COMMERCIAL

## 2023-04-09 ENCOUNTER — PATIENT MESSAGE (OUTPATIENT)
Dept: ENDOCRINOLOGY | Facility: CLINIC | Age: 48
End: 2023-04-09
Payer: COMMERCIAL

## 2023-04-10 ENCOUNTER — PATIENT MESSAGE (OUTPATIENT)
Dept: PRIMARY CARE CLINIC | Facility: CLINIC | Age: 48
End: 2023-04-10
Payer: COMMERCIAL

## 2023-04-17 PROBLEM — K92.0 HEMATEMESIS OF FRESH BLOOD: Status: RESOLVED | Noted: 2023-01-05 | Resolved: 2023-04-17

## 2023-06-04 NOTE — PROGRESS NOTES
Endocrinology Return Visit  The patient location is: [place]   The chief complaint leading to consultation is: T2DM    Visit type: audiovisual    Face to Face time with patient: 30  45 minutes of total time spent on the encounter, which includes face to face time and non-face to face time preparing to see the patient (eg, review of tests), Obtaining and/or reviewing separately obtained history, Documenting clinical information in the electronic or other health record, Independently interpreting results (not separately reported) and communicating results to the patient/family/caregiver, or Care coordination (not separately reported).     Each patient to whom he or she provides medical services by telemedicine is:  (1) informed of the relationship between the physician and patient and the respective role of any other health care provider with respect to management of the patient; and (2) notified that he or she may decline to receive medical services by telemedicine and may withdraw from such care at any time.  Subjective:      Patient ID: Alberto Song is a 47 y.o. male.    Chief Complaint:  Diabetes      History of Present Illness    Medical dx include T2DM, HTN, MDD, EtOH cirrhosis. He has a hx of EtOH/gallstone pancreatitis and is s/p cholecystectomy in 2/2023.    Last seen by Dr Cardenas on 2/14/2023.    Diet much better lately and had weaned himself off insulin with strict dietary changes. BG better even without insulin briefly, until last week when his dad was visiting and he did not stick with strict diet.      T2DM  Diagnosed in 1/2023 when he was admitted for EtOH/Gallstone pancreatitis. Discharged on MDI due to hyperglycemia inpatient despite a1c <6. C-peptide 3.87 with  when seen outpatient in 2/2023.    Known complications: hyperglycemia    Current Diabetes Regimen:  Levemir 20 units in the morning once daily.   Humalog 6u AC    Omitted doses: denies     Recently restarted insulin. He had weaned  himself off using lifestyle/diet changes but restarted based on hyperglycemia when not following strict diet due to a family member in town visiting.    Prior mediations tried:  Prandial insulin - stopped due to improved BG with lifestyle/diet changes     Diet/Exercise:  Since his d/c in 2/2023:  1) avoiding sodas, carbs and sugars, avoiding fruits as well  2) eating protein and vegetables only   3) weight loss 40 lbs   4) improved in blood sugars based on diet: from 300 --> 120 - 170  5) checking blood sugars before and after meals.     Recent Hgb A1C:  Lab Results   Component Value Date    HGBA1C 5.7 (H) 01/10/2023       Glucose Monitoring:  Fingersticks 3-4x/day     Hypoglycemic Episodes:   Denies hypoglycemic symptoms.     Screening / DM Complications:    Nephropathy:  ACEi/ARB: Not taking  No results found for: MICALBCREAT    Last Lipid Panel:  Statin: Not taking  Lab Results   Component Value Date    LDLCALC 359.0 (H) 11/04/2022       Last foot exam Most Recent Foot Exam Date: Not Found  Last eye exam Most Recent Eye Exam Date: Not Found;  no laser surgery or DR    B12:  No results found for: [id number]        ROS: as above    Objective:   Physical Exam  Constitutional:       General: He is not in acute distress.  Neurological:      Mental Status: He is alert and oriented to person, place, and time.   Psychiatric:         Mood and Affect: Mood normal.         Behavior: Behavior normal.     There were no vitals filed for this visit.    BP Readings from Last 3 Encounters:   02/08/23 138/75   01/30/23 (!) 137/90   01/26/23 (!) 142/88     Wt Readings from Last 1 Encounters:   02/08/23 0901 108 kg (238 lb 3.3 oz)   02/06/23 1002 108.9 kg (240 lb)       There is no height or weight on file to calculate BMI.    Lab Review:   Lab Results   Component Value Date    HGBA1C 5.7 (H) 01/10/2023     Lab Results   Component Value Date    CHOL 433 (H) 11/04/2022    HDL 27 (L) 11/04/2022    LDLCALC 359.0 (H) 11/04/2022    TRIG  235 (H) 11/04/2022    CHOLHDL 6.2 (L) 11/04/2022     Lab Results   Component Value Date     01/26/2023    K 3.6 01/26/2023     01/26/2023    CO2 23 01/26/2023     (H) 02/23/2023    BUN 7 01/26/2023    CREATININE 0.9 01/26/2023    CALCIUM 9.0 01/26/2023    PROT 6.9 01/26/2023    ALBUMIN 3.4 (L) 01/26/2023    BILITOT 0.5 01/26/2023    ALKPHOS 119 01/26/2023    AST 21 01/26/2023    ALT 27 01/26/2023    ANIONGAP 13 01/26/2023    TSH 0.525 11/04/2022     CT of abdomen 2023   right adrenal gland appear unremarkable.  Hyperplastic changes of the left adrenal gland medial limb and body are noted.    Assessment and Plan     History of pancreatitis  Hx of pancreatitis in early 2023 2/2 EtOH and gallstones, now s/p cholecystectomy in 2/2023  Avoid GLP1 agonist and DPP4i given hx of pancreatitis     Hyperlipidemia  Significantly elevated LDL on lipid panel in 11/2022 - this was shortly before admission for pancreatitis  Will recheck fasting lipid panel now   Plan to start Crestor regardless but will await repeat labs to determine starting dose      Type 2 diabetes mellitus without complication, without long-term current use of insulin  No prior known dx of DM until admitted for pancreatitis early 2023. Significant hyperglycemia during that admission and discharged on MDI  Since then he has self weaned insulin using strict diet and lifestyle changes  Restarted insulin last week due to his father being in town and not adhering to diet  Current insulin doses are Levemir 20u qd and Humalog 6u with meals  Given his hx of pancreatitis - will avoid GLP1 agonist and DPP4i. Will begin Metformin - discussed how to slowly increase metformin dose to goal 1000 mg BID  Continue Levemir 20u but will hold prandial insulin and see how he does on metformin and basal insulin only  Continue accuchecks as he is doing  Refer to optometry  He would like foot exam at next in-person visit with endo or PCP  Will get urine  micrioalbumin/cr, a1c, lipids, cmp, and cbc now and then repeat a1c before next f/u in 3-4mo    Patient Instructions   Thank you for completing a virtual visit with me!     Here is what we discussed today:  - we will get fasting labs now to check cholesterol, a1c, a chemistry panel to check liver and kidney function and electrolytes, and a CBC to check your hemoglobin.    - After your lab results we will plan to start a cholesterol medication called Crestor or Rosuvastatin. I would like to see your labs to help determine a starting dose.    - Continue Levemir 20 units once daily (can do this in the morning or at bedtime but be consistent with when you take it)    - STOP Humalog (Lispro insulin)    - Begin Metformin.    Metformin 500 mg extended release tablet instructions:    Start with one 500 mg tablet with breakfast for one week.    2nd week. Continue to take one tablet with breakfast and add a 2nd tablet with dinner.    3rd week: 2 tablets with breakfast and 1 tablet with supper    Let me know if you feel nauseated or have diarrhea. The symptoms usually resolve with time.   I recommend that if you feel badly on the higher dose (it is usually taking 1000 mg at one time) to spread the pills out. For example, take one with breakfast, one with lunch and one with dinner.   I can also increase the dose to 750 mg one tablet twice a day to keep it simple.     - Please continue checking your sugars and let us know if you are having highs (persistently >200) or lows (<70).    - I placed a referral for optometry - someone should contact you to schedule an appt. You also need an annual foot exam which can be done by us (endocrine) or a primary care provider at your next in-person appt.    - Follow up with us in 3-4 months with another a1c before  that visit.    I will ask my medical assistant to contact you to schedule fasting labs in the next week or two and the follow up appt in 3-4mo with a1c before that  visit.      Please let me know if you have any other questions.      Kary Ruelas MD    [facility], [address]  [address] [place], [place], [address]    Office: [phone]  Fax: [phone]          Fasting labs now - lipid panel, a1c, CBC, CMP and urine microalbumin/cr  RTC in 3-4mo with a1c before that visit

## 2023-06-06 ENCOUNTER — PATIENT MESSAGE (OUTPATIENT)
Dept: ENDOCRINOLOGY | Facility: CLINIC | Age: 48
End: 2023-06-06

## 2023-06-06 ENCOUNTER — OFFICE VISIT (OUTPATIENT)
Dept: ENDOCRINOLOGY | Facility: CLINIC | Age: 48
End: 2023-06-06
Payer: COMMERCIAL

## 2023-06-06 DIAGNOSIS — E11.9 TYPE 2 DIABETES MELLITUS WITHOUT COMPLICATION, WITHOUT LONG-TERM CURRENT USE OF INSULIN: Primary | ICD-10-CM

## 2023-06-06 DIAGNOSIS — E78.5 HYPERLIPIDEMIA, UNSPECIFIED HYPERLIPIDEMIA TYPE: ICD-10-CM

## 2023-06-06 DIAGNOSIS — R73.9 HYPERGLYCEMIA: ICD-10-CM

## 2023-06-06 DIAGNOSIS — Z87.19 HISTORY OF PANCREATITIS: ICD-10-CM

## 2023-06-06 PROCEDURE — 3044F PR MOST RECENT HEMOGLOBIN A1C LEVEL <7.0%: ICD-10-PCS | Mod: CPTII,95,, | Performed by: STUDENT IN AN ORGANIZED HEALTH CARE EDUCATION/TRAINING PROGRAM

## 2023-06-06 PROCEDURE — 99214 PR OFFICE/OUTPT VISIT, EST, LEVL IV, 30-39 MIN: ICD-10-PCS | Mod: 95,,, | Performed by: STUDENT IN AN ORGANIZED HEALTH CARE EDUCATION/TRAINING PROGRAM

## 2023-06-06 PROCEDURE — 1159F PR MEDICATION LIST DOCUMENTED IN MEDICAL RECORD: ICD-10-PCS | Mod: CPTII,95,, | Performed by: STUDENT IN AN ORGANIZED HEALTH CARE EDUCATION/TRAINING PROGRAM

## 2023-06-06 PROCEDURE — 4010F ACE/ARB THERAPY RXD/TAKEN: CPT | Mod: CPTII,95,, | Performed by: STUDENT IN AN ORGANIZED HEALTH CARE EDUCATION/TRAINING PROGRAM

## 2023-06-06 PROCEDURE — 3044F HG A1C LEVEL LT 7.0%: CPT | Mod: CPTII,95,, | Performed by: STUDENT IN AN ORGANIZED HEALTH CARE EDUCATION/TRAINING PROGRAM

## 2023-06-06 PROCEDURE — 1160F PR REVIEW ALL MEDS BY PRESCRIBER/CLIN PHARMACIST DOCUMENTED: ICD-10-PCS | Mod: CPTII,95,, | Performed by: STUDENT IN AN ORGANIZED HEALTH CARE EDUCATION/TRAINING PROGRAM

## 2023-06-06 PROCEDURE — 1160F RVW MEDS BY RX/DR IN RCRD: CPT | Mod: CPTII,95,, | Performed by: STUDENT IN AN ORGANIZED HEALTH CARE EDUCATION/TRAINING PROGRAM

## 2023-06-06 PROCEDURE — 99214 OFFICE O/P EST MOD 30 MIN: CPT | Mod: 95,,, | Performed by: STUDENT IN AN ORGANIZED HEALTH CARE EDUCATION/TRAINING PROGRAM

## 2023-06-06 PROCEDURE — 1159F MED LIST DOCD IN RCRD: CPT | Mod: CPTII,95,, | Performed by: STUDENT IN AN ORGANIZED HEALTH CARE EDUCATION/TRAINING PROGRAM

## 2023-06-06 PROCEDURE — 4010F PR ACE/ARB THEARPY RXD/TAKEN: ICD-10-PCS | Mod: CPTII,95,, | Performed by: STUDENT IN AN ORGANIZED HEALTH CARE EDUCATION/TRAINING PROGRAM

## 2023-06-06 RX ORDER — ROSUVASTATIN CALCIUM 10 MG/1
10 TABLET, COATED ORAL DAILY
Qty: 90 TABLET | Refills: 3 | Status: CANCELLED | OUTPATIENT
Start: 2023-06-06 | End: 2024-06-05

## 2023-06-06 RX ORDER — METFORMIN HYDROCHLORIDE 500 MG/1
1000 TABLET, EXTENDED RELEASE ORAL 2 TIMES DAILY WITH MEALS
Qty: 360 TABLET | Refills: 3 | Status: ON HOLD | OUTPATIENT
Start: 2023-06-06 | End: 2023-11-10 | Stop reason: SDUPTHER

## 2023-06-06 NOTE — ASSESSMENT & PLAN NOTE
Significantly elevated LDL on lipid panel in 11/2022 - this was shortly before admission for pancreatitis  Will recheck fasting lipid panel now   Plan to start Crestor regardless but will await repeat labs to determine starting dose

## 2023-06-06 NOTE — Clinical Note
Hi! Can you please contact the pt to schedule fasting labs (a1c, lipids, CBC, CMP and urine microalbumin/Cr) in the next week or two? RTC for f/u in 3-4mo with a1c before  that visit as well  Thank you so much! Kary

## 2023-06-06 NOTE — ASSESSMENT & PLAN NOTE
Hx of pancreatitis in early 2023 2/2 EtOH and gallstones, now s/p cholecystectomy in 2/2023  Avoid GLP1 agonist and DPP4i given hx of pancreatitis

## 2023-06-06 NOTE — PROGRESS NOTES
I have reviewed and concur with the resident's history, physical, assessment, and plan.  I supervised and interacted with the resident during the patient examination via real-time, audio/video telecommunications technology, and all questions were answered.      Olu Krishnamurthy MD  Endocrinology Staff

## 2023-06-06 NOTE — PATIENT INSTRUCTIONS
Thank you for completing a virtual visit with me!     Here is what we discussed today:  - we will get fasting labs now to check cholesterol, a1c, a chemistry panel to check liver and kidney function and electrolytes, and a CBC to check your hemoglobin.    - After your lab results we will plan to start a cholesterol medication called Crestor or Rosuvastatin. I would like to see your labs to help determine a starting dose.    - Continue Levemir 20 units once daily (can do this in the morning or at bedtime but be consistent with when you take it)    - STOP Humalog (Lispro insulin)    - Begin Metformin.    Metformin 500 mg extended release tablet instructions:    Start with one 500 mg tablet with breakfast for one week.    2nd week. Continue to take one tablet with breakfast and add a 2nd tablet with dinner.    3rd week: 2 tablets with breakfast and 1 tablet with supper    Let me know if you feel nauseated or have diarrhea. The symptoms usually resolve with time.   I recommend that if you feel badly on the higher dose (it is usually taking 1000 mg at one time) to spread the pills out. For example, take one with breakfast, one with lunch and one with dinner.   I can also increase the dose to 750 mg one tablet twice a day to keep it simple.     - Please continue checking your sugars and let us know if you are having highs (persistently >200) or lows (<70).    - I placed a referral for optometry - someone should contact you to schedule an appt. You also need an annual foot exam which can be done by us (endocrine) or a primary care provider at your next in-person appt.    - Follow up with us in 3-4 months with another a1c before  that visit.    I will ask my medical assistant to contact you to schedule fasting labs in the next week or two and the follow up appt in 3-4mo with a1c before that visit.      Please let me know if you have any other questions.      Kary Ruelas MD    [facility], [address]th  Floor  [address] [place], [place], [address]    Office: [phone]  Fax: [phone]

## 2023-06-06 NOTE — ASSESSMENT & PLAN NOTE
No prior known dx of DM until admitted for pancreatitis early 2023. Significant hyperglycemia during that admission and discharged on MDI  Since then he has self weaned insulin using strict diet and lifestyle changes  Restarted insulin last week due to his father being in town and not adhering to diet  Current insulin doses are Levemir 20u qd and Humalog 6u with meals  Given his hx of pancreatitis - will avoid GLP1 agonist and DPP4i. Will begin Metformin - discussed how to slowly increase metformin dose to goal 1000 mg BID  Continue Levemir 20u but will hold prandial insulin and see how he does on metformin and basal insulin only  Continue accuchecks as he is doing  Refer to optometry  He would like foot exam at next in-person visit with endo or PCP  Will get urine micrioalbumin/cr, a1c, lipids, cmp, and cbc now and then repeat a1c before next f/u in 3-4mo

## 2023-06-08 ENCOUNTER — PATIENT MESSAGE (OUTPATIENT)
Dept: PRIMARY CARE CLINIC | Facility: CLINIC | Age: 48
End: 2023-06-08
Payer: COMMERCIAL

## 2023-06-09 RX ORDER — LISINOPRIL 20 MG/1
20 TABLET ORAL DAILY
Qty: 90 TABLET | Refills: 1 | Status: ON HOLD | OUTPATIENT
Start: 2023-06-09 | End: 2023-11-10 | Stop reason: SDUPTHER

## 2023-06-09 RX ORDER — METOPROLOL SUCCINATE 25 MG/1
25 TABLET, EXTENDED RELEASE ORAL DAILY
Qty: 90 TABLET | Refills: 1 | Status: SHIPPED | OUTPATIENT
Start: 2023-06-09 | End: 2023-07-07

## 2023-06-12 DIAGNOSIS — E78.5 HYPERLIPIDEMIA, UNSPECIFIED HYPERLIPIDEMIA TYPE: Primary | ICD-10-CM

## 2023-06-12 DIAGNOSIS — E11.9 TYPE 2 DIABETES MELLITUS WITHOUT COMPLICATION, WITHOUT LONG-TERM CURRENT USE OF INSULIN: ICD-10-CM

## 2023-06-12 RX ORDER — ROSUVASTATIN CALCIUM 10 MG/1
10 TABLET, COATED ORAL DAILY
Qty: 90 TABLET | Refills: 3 | Status: ON HOLD | OUTPATIENT
Start: 2023-06-12 | End: 2023-11-10 | Stop reason: HOSPADM

## 2023-06-16 ENCOUNTER — PATIENT MESSAGE (OUTPATIENT)
Dept: PRIMARY CARE CLINIC | Facility: CLINIC | Age: 48
End: 2023-06-16

## 2023-06-16 ENCOUNTER — OFFICE VISIT (OUTPATIENT)
Dept: PRIMARY CARE CLINIC | Facility: CLINIC | Age: 48
End: 2023-06-16
Payer: COMMERCIAL

## 2023-06-16 VITALS
TEMPERATURE: 98 F | RESPIRATION RATE: 18 BRPM | WEIGHT: 214.94 LBS | HEART RATE: 80 BPM | OXYGEN SATURATION: 96 % | DIASTOLIC BLOOD PRESSURE: 78 MMHG | BODY MASS INDEX: 26.18 KG/M2 | HEIGHT: 76 IN | SYSTOLIC BLOOD PRESSURE: 126 MMHG

## 2023-06-16 DIAGNOSIS — I10 BENIGN ESSENTIAL HTN: Primary | ICD-10-CM

## 2023-06-16 DIAGNOSIS — E11.69 TYPE 2 DIABETES MELLITUS WITH HYPERLIPIDEMIA: ICD-10-CM

## 2023-06-16 DIAGNOSIS — E78.5 TYPE 2 DIABETES MELLITUS WITH HYPERLIPIDEMIA: ICD-10-CM

## 2023-06-16 DIAGNOSIS — Z11.59 NEED FOR HEPATITIS C SCREENING TEST: ICD-10-CM

## 2023-06-16 DIAGNOSIS — I86.1 LEFT VARICOCELE: ICD-10-CM

## 2023-06-16 DIAGNOSIS — B35.6 JOCK ITCH: ICD-10-CM

## 2023-06-16 DIAGNOSIS — Z12.11 COLON CANCER SCREENING: ICD-10-CM

## 2023-06-16 DIAGNOSIS — Z11.4 ENCOUNTER FOR SCREENING FOR HIV: ICD-10-CM

## 2023-06-16 DIAGNOSIS — L82.1 SEBORRHEIC KERATOSIS: ICD-10-CM

## 2023-06-16 DIAGNOSIS — Z23 NEED FOR VACCINATION: ICD-10-CM

## 2023-06-16 PROCEDURE — 3044F PR MOST RECENT HEMOGLOBIN A1C LEVEL <7.0%: ICD-10-PCS | Mod: CPTII,S$GLB,, | Performed by: STUDENT IN AN ORGANIZED HEALTH CARE EDUCATION/TRAINING PROGRAM

## 2023-06-16 PROCEDURE — 3061F NEG MICROALBUMINURIA REV: CPT | Mod: CPTII,S$GLB,, | Performed by: STUDENT IN AN ORGANIZED HEALTH CARE EDUCATION/TRAINING PROGRAM

## 2023-06-16 PROCEDURE — 3008F BODY MASS INDEX DOCD: CPT | Mod: CPTII,S$GLB,, | Performed by: STUDENT IN AN ORGANIZED HEALTH CARE EDUCATION/TRAINING PROGRAM

## 2023-06-16 PROCEDURE — 3066F NEPHROPATHY DOC TX: CPT | Mod: CPTII,S$GLB,, | Performed by: STUDENT IN AN ORGANIZED HEALTH CARE EDUCATION/TRAINING PROGRAM

## 2023-06-16 PROCEDURE — 3074F SYST BP LT 130 MM HG: CPT | Mod: CPTII,S$GLB,, | Performed by: STUDENT IN AN ORGANIZED HEALTH CARE EDUCATION/TRAINING PROGRAM

## 2023-06-16 PROCEDURE — 99214 PR OFFICE/OUTPT VISIT, EST, LEVL IV, 30-39 MIN: ICD-10-PCS | Mod: S$GLB,,, | Performed by: STUDENT IN AN ORGANIZED HEALTH CARE EDUCATION/TRAINING PROGRAM

## 2023-06-16 PROCEDURE — 99214 OFFICE O/P EST MOD 30 MIN: CPT | Mod: S$GLB,,, | Performed by: STUDENT IN AN ORGANIZED HEALTH CARE EDUCATION/TRAINING PROGRAM

## 2023-06-16 PROCEDURE — 4010F PR ACE/ARB THEARPY RXD/TAKEN: ICD-10-PCS | Mod: CPTII,S$GLB,, | Performed by: STUDENT IN AN ORGANIZED HEALTH CARE EDUCATION/TRAINING PROGRAM

## 2023-06-16 PROCEDURE — 3061F PR NEG MICROALBUMINURIA RESULT DOCUMENTED/REVIEW: ICD-10-PCS | Mod: CPTII,S$GLB,, | Performed by: STUDENT IN AN ORGANIZED HEALTH CARE EDUCATION/TRAINING PROGRAM

## 2023-06-16 PROCEDURE — 3078F PR MOST RECENT DIASTOLIC BLOOD PRESSURE < 80 MM HG: ICD-10-PCS | Mod: CPTII,S$GLB,, | Performed by: STUDENT IN AN ORGANIZED HEALTH CARE EDUCATION/TRAINING PROGRAM

## 2023-06-16 PROCEDURE — 4010F ACE/ARB THERAPY RXD/TAKEN: CPT | Mod: CPTII,S$GLB,, | Performed by: STUDENT IN AN ORGANIZED HEALTH CARE EDUCATION/TRAINING PROGRAM

## 2023-06-16 PROCEDURE — 3008F PR BODY MASS INDEX (BMI) DOCUMENTED: ICD-10-PCS | Mod: CPTII,S$GLB,, | Performed by: STUDENT IN AN ORGANIZED HEALTH CARE EDUCATION/TRAINING PROGRAM

## 2023-06-16 PROCEDURE — 1160F RVW MEDS BY RX/DR IN RCRD: CPT | Mod: CPTII,S$GLB,, | Performed by: STUDENT IN AN ORGANIZED HEALTH CARE EDUCATION/TRAINING PROGRAM

## 2023-06-16 PROCEDURE — 1160F PR REVIEW ALL MEDS BY PRESCRIBER/CLIN PHARMACIST DOCUMENTED: ICD-10-PCS | Mod: CPTII,S$GLB,, | Performed by: STUDENT IN AN ORGANIZED HEALTH CARE EDUCATION/TRAINING PROGRAM

## 2023-06-16 PROCEDURE — 99999 PR PBB SHADOW E&M-EST. PATIENT-LVL IV: ICD-10-PCS | Mod: PBBFAC,,, | Performed by: STUDENT IN AN ORGANIZED HEALTH CARE EDUCATION/TRAINING PROGRAM

## 2023-06-16 PROCEDURE — 1159F PR MEDICATION LIST DOCUMENTED IN MEDICAL RECORD: ICD-10-PCS | Mod: CPTII,S$GLB,, | Performed by: STUDENT IN AN ORGANIZED HEALTH CARE EDUCATION/TRAINING PROGRAM

## 2023-06-16 PROCEDURE — 3044F HG A1C LEVEL LT 7.0%: CPT | Mod: CPTII,S$GLB,, | Performed by: STUDENT IN AN ORGANIZED HEALTH CARE EDUCATION/TRAINING PROGRAM

## 2023-06-16 PROCEDURE — 99999 PR PBB SHADOW E&M-EST. PATIENT-LVL IV: CPT | Mod: PBBFAC,,, | Performed by: STUDENT IN AN ORGANIZED HEALTH CARE EDUCATION/TRAINING PROGRAM

## 2023-06-16 PROCEDURE — 3074F PR MOST RECENT SYSTOLIC BLOOD PRESSURE < 130 MM HG: ICD-10-PCS | Mod: CPTII,S$GLB,, | Performed by: STUDENT IN AN ORGANIZED HEALTH CARE EDUCATION/TRAINING PROGRAM

## 2023-06-16 PROCEDURE — 3066F PR DOCUMENTATION OF TREATMENT FOR NEPHROPATHY: ICD-10-PCS | Mod: CPTII,S$GLB,, | Performed by: STUDENT IN AN ORGANIZED HEALTH CARE EDUCATION/TRAINING PROGRAM

## 2023-06-16 PROCEDURE — 3078F DIAST BP <80 MM HG: CPT | Mod: CPTII,S$GLB,, | Performed by: STUDENT IN AN ORGANIZED HEALTH CARE EDUCATION/TRAINING PROGRAM

## 2023-06-16 PROCEDURE — 1159F MED LIST DOCD IN RCRD: CPT | Mod: CPTII,S$GLB,, | Performed by: STUDENT IN AN ORGANIZED HEALTH CARE EDUCATION/TRAINING PROGRAM

## 2023-06-16 RX ORDER — CHLORTHALIDONE 25 MG/1
25 TABLET ORAL DAILY
Qty: 30 TABLET | Refills: 11 | Status: SHIPPED | OUTPATIENT
Start: 2023-06-16 | End: 2023-07-07

## 2023-06-16 RX ORDER — KETOCONAZOLE 20 MG/G
CREAM TOPICAL DAILY
Qty: 60 G | Refills: 1 | Status: ON HOLD | OUTPATIENT
Start: 2023-06-16 | End: 2023-11-06 | Stop reason: CLARIF

## 2023-06-16 RX ORDER — KETOCONAZOLE 20 MG/G
CREAM TOPICAL DAILY
Qty: 60 G | Refills: 1 | Status: SHIPPED | OUTPATIENT
Start: 2023-06-16 | End: 2023-06-16

## 2023-06-16 RX ORDER — SODIUM, POTASSIUM,MAG SULFATES 17.5-3.13G
1 SOLUTION, RECONSTITUTED, ORAL ORAL DAILY
Qty: 1 KIT | Refills: 0 | Status: SHIPPED | OUTPATIENT
Start: 2023-06-16 | End: 2023-06-18

## 2023-06-16 NOTE — PROGRESS NOTES
Subjective:       Patient ID: Alberto Song is a 47 y.o. male.    Chief Complaint: No chief complaint on file.      HPI:  47 y.o. male presents to [facility] to establish care      Acute concerns?: Patient reports he has not had an active Rx for diuretic such as HCTZ or chlorthalidone. Was on 25 mg chlorthalidone in past. Did have low potassium in past and that was the reason why the medication was stopped.    Answers submitted by the patient for this visit:  High Blood Pressure Questionnaire (Submitted on 6/12/2023)  Chief Complaint: Hypertension  Chronicity: chronic  Onset: more than 1 year ago  Progression since onset: waxing and waning  Condition status: resistant  anxiety: No  blurred vision: No  malaise/fatigue: No  orthopnea: No  peripheral edema: Yes  PND: No  sweats: No  Agents associated with hypertension: no associated agents  CAD risks: diabetes mellitus, sedentary lifestyle, smoking/tobacco exposure  Compliance problems: no compliance problems  Past treatments: ACE inhibitors, beta blockers, diuretics  Improvement on treatment: mild    Patient has a small hard mass about size of grain of rice in scrotum.Non-tender. Noticed two months ago. Feels like could be vasculature. Is on left side.    Would like a derm referral. Patient reports history of seborrheic keratosis, used to get frozen off in past years.    Has been seeing endocrinologist on [facility]. Did multiple tests. Lipid     HTN:  Home BP log?: 160s systolic prior to starting chlorthalidone, improved following   Medications: lisinopril 20 mg, metoprolol succinate 25 mg, (intermittent chlorthalidone)  Compliance?: Not missing medications  Diet?:  Very strict less than 2500 mg nader diet, low carb  Exercise?:     Will see Endocrinology again in 5 months.  Has been off of SSI  Down to 6U insulin detemir daily now    Never had scrotal infection in past.    On CPAP?: Is currently    Last PCP?: Dr. Ordaz  Allergies: NKDA  Medical History: Asthma,  "T2DM, HLD, HTN, KINGA, jock itch  Medications: lisinopril 20 mg, metformin 1,000 mg bid, metoprolol succiante 25 mg, rosuvastatin 10 mg, insulin detemir 20 U daily, ketoconazole 2% cream  Surgical History: cholecystectomy  Family History: Mother throat cancer; no known autoimmune disease  Social History: Smokes 1ppd, no interest in quitting, EtOH occasionally excess on stents, no illicits  Wife  past year and went to [facility] with pancreatitis    Hep C/HIV screening?: Amenable  Colonoscopy Hx?: Amenable    Review of Systems   Respiratory:  Negative for shortness of breath.    Cardiovascular:  Negative for chest pain and palpitations.   Musculoskeletal:  Negative for neck pain.   Neurological:  Negative for headaches.     Objective:      Vitals:    23 1138   BP: 126/78   BP Location: Right arm   Patient Position: Sitting   BP Method: Medium (Manual)   Pulse: 80   Resp: 18   Temp: 97.9 °F (36.6 °C)   TempSrc: Temporal   SpO2: 96%   Weight: 97.5 kg (214 lb 15.2 oz)   Height: 6' 4" (1.93 m)     Physical Exam  Vitals reviewed.   Constitutional:       General: He is not in acute distress.     Appearance: Normal appearance. He is not ill-appearing.   HENT:      Head: Normocephalic and atraumatic.   Eyes:      General:         Right eye: No discharge.         Left eye: No discharge.      Conjunctiva/sclera: Conjunctivae normal.   Cardiovascular:      Rate and Rhythm: Normal rate and regular rhythm.      Pulses: Normal pulses.      Heart sounds: No murmur heard.  Pulmonary:      Effort: Pulmonary effort is normal.      Breath sounds: Normal breath sounds.   Genitourinary:     Comments: Small rubbery, non tender mobile mass superior to epididymis left scrotum.  Musculoskeletal:         General: No deformity.      Cervical back: Neck supple. No rigidity.   Lymphadenopathy:      Cervical: No cervical adenopathy.   Skin:     General: Skin is warm and dry.      Coloration: Skin is not jaundiced.   Neurological:      " "General: No focal deficit present.      Mental Status: He is alert and oriented to person, place, and time.   Psychiatric:         Mood and Affect: Mood normal.         Behavior: Behavior normal.           Lab Results   Component Value Date     06/08/2023    K 4.0 06/08/2023     06/08/2023    CO2 22 (L) 06/08/2023    BUN 13 06/08/2023    CREATININE 0.9 06/08/2023    ANIONGAP 10 06/08/2023     Lab Results   Component Value Date    HGBA1C 5.6 06/08/2023    HGBA1C 5.6 06/08/2023     No results found for: BNP, BNPTRIAGEBLO    Lab Results   Component Value Date    WBC 4.24 06/08/2023    HGB 13.2 (L) 06/08/2023    HCT 41.1 06/08/2023    HCT 45 01/06/2023     06/08/2023    GRAN 2.0 06/08/2023    GRAN 47.8 06/08/2023     Lab Results   Component Value Date    CHOL 173 06/08/2023    HDL 42 06/08/2023    LDLCALC 117.2 06/08/2023    TRIG 69 06/08/2023          Current Outpatient Medications:     blood sugar diagnostic Strp, 1 strip by Misc.(Non-Drug; Combo Route) route 2 (two) times daily., Disp: 100 strip, Rfl: 0    blood-glucose meter kit, Use as instructed, Disp: 1 each, Rfl: 0    insulin syringe-needle U-100 0.5 mL 31 gauge x 5/16" Syrg, Insulin infection 5 x a day, Disp: 100 each, Rfl: 3    lisinopriL (PRINIVIL,ZESTRIL) 20 MG tablet, Take 1 tablet (20 mg total) by mouth once daily., Disp: 90 tablet, Rfl: 1    metFORMIN (GLUCOPHAGE-XR) 500 MG ER 24hr tablet, Take 2 tablets (1,000 mg total) by mouth 2 (two) times daily with meals., Disp: 360 tablet, Rfl: 3    metoprolol succinate (TOPROL-XL) 25 MG 24 hr tablet, Take 1 tablet (25 mg total) by mouth once daily., Disp: 90 tablet, Rfl: 1    pen needle, diabetic 31 gauge x 1/4" Ndle, 1 applicator by Misc.(Non-Drug; Combo Route) route 4 (four) times daily., Disp: 100 each, Rfl: 20    rosuvastatin (CRESTOR) 10 MG tablet, Take 1 tablet (10 mg total) by mouth once daily., Disp: 90 tablet, Rfl: 3    chlorthalidone (HYGROTEN) 25 MG Tab, Take 1 tablet (25 mg total) by " mouth once daily., Disp: 30 tablet, Rfl: 11    empagliflozin (JARDIANCE) 10 mg tablet, Take 1 tablet (10 mg total) by mouth once daily., Disp: 30 tablet, Rfl: 1    ketoconazole (NIZORAL) 2 % cream, Apply topically once daily., Disp: 60 g, Rfl: 1        Assessment:       1. Benign essential HTN    2. Seborrheic keratosis    3. Encounter for screening for HIV    4. Need for hepatitis C screening test    5. Colon cancer screening    6. Jock itch    7. Need for vaccination    8. Type 2 diabetes mellitus with hyperlipidemia           Plan:       Benign essential HTN  -     chlorthalidone (HYGROTEN) 25 MG Tab; Take 1 tablet (25 mg total) by mouth once daily.  Dispense: 30 tablet; Refill: 11  - Will repeat potassium levels in 2 weeks  - Doing well with lifestyle interventions    Seborrheic keratosis  -     Ambulatory referral/consult to Dermatology; Future; Expected date: 06/23/2023    Encounter for screening for HIV  -     HIV 1/2 Ag/Ab (4th Gen); Future; Expected date: 06/16/2023    Need for hepatitis C screening test  -     Hepatitis C Antibody; Future; Expected date: 06/16/2023    Colon cancer screening  -     Case Request Endoscopy: COLONOSCOPY    Jock itch  -     ketoconazole (NIZORAL) 2 % cream; Apply topically once daily.  Dispense: 60 g; Refill: 1    Need for vaccination  -     (In Office Administered) HPV Vaccine (9-Valent) (3 Dose) (IM); Future    Type 2 diabetes mellitus with hyperlipidemia  -     empagliflozin (JARDIANCE) 10 mg tablet; Take 1 tablet (10 mg total) by mouth once daily.  Dispense: 30 tablet; Refill: 1  - Continue good diet and lifestyle interventions    Vacicocele  - Will continue to monitor    RTC in 4 weeks

## 2023-06-19 ENCOUNTER — PATIENT MESSAGE (OUTPATIENT)
Dept: PRIMARY CARE CLINIC | Facility: CLINIC | Age: 48
End: 2023-06-19
Payer: COMMERCIAL

## 2023-06-21 ENCOUNTER — PATIENT MESSAGE (OUTPATIENT)
Dept: PRIMARY CARE CLINIC | Facility: CLINIC | Age: 48
End: 2023-06-21
Payer: COMMERCIAL

## 2023-06-22 DIAGNOSIS — Z12.11 COLON CANCER SCREENING: Primary | ICD-10-CM

## 2023-06-23 ENCOUNTER — PATIENT OUTREACH (OUTPATIENT)
Dept: ADMINISTRATIVE | Facility: HOSPITAL | Age: 48
End: 2023-06-23
Payer: COMMERCIAL

## 2023-06-23 NOTE — PROGRESS NOTES
Health Maintenance Due   Topic Date Due    COVID-19 Vaccine (1) Never done    Foot Exam  Never done    Colorectal Cancer Screening  Never done        Chart review done.   HM updated.   Immunizations reviewed & updated.   Care Everywhere updated.

## 2023-06-27 ENCOUNTER — TELEPHONE (OUTPATIENT)
Dept: OPHTHALMOLOGY | Facility: CLINIC | Age: 48
End: 2023-06-27
Payer: COMMERCIAL

## 2023-06-27 NOTE — TELEPHONE ENCOUNTER
----- Message from Isabelle Langston OD sent at 6/27/2023 11:11 AM CDT -----  Contact: Patient, [phone]    ----- Message -----  From: Gretchen Gutierrez  Sent: 6/27/2023  11:05 AM CDT  To: Isabelle Langston Staff    Calling to reschedule his appointment. Please call him. Thanks.

## 2023-06-30 ENCOUNTER — PATIENT MESSAGE (OUTPATIENT)
Dept: PRIMARY CARE CLINIC | Facility: CLINIC | Age: 48
End: 2023-06-30
Payer: COMMERCIAL

## 2023-06-30 DIAGNOSIS — Z86.39 HISTORY OF HYPOKALEMIA: ICD-10-CM

## 2023-06-30 DIAGNOSIS — R74.8 ELEVATED SERUM GGT LEVEL: Primary | ICD-10-CM

## 2023-07-05 LAB — NONINV COLON CA DNA+OCC BLD SCRN STL QL: NORMAL

## 2023-07-06 ENCOUNTER — DOCUMENTATION ONLY (OUTPATIENT)
Dept: SURGERY | Facility: CLINIC | Age: 48
End: 2023-07-06
Payer: COMMERCIAL

## 2023-07-06 NOTE — PROGRESS NOTES
YARA Mccall, MA; Torsten Bella LPN  Cc: Susan Peña, CST  Caller: Unspecified (2 weeks ago)  Patient called me and said he will do cologuard, wants to cancel EGD, take him off th

## 2023-07-07 ENCOUNTER — PATIENT MESSAGE (OUTPATIENT)
Dept: PRIMARY CARE CLINIC | Facility: CLINIC | Age: 48
End: 2023-07-07

## 2023-07-07 ENCOUNTER — OFFICE VISIT (OUTPATIENT)
Dept: PRIMARY CARE CLINIC | Facility: CLINIC | Age: 48
End: 2023-07-07
Payer: COMMERCIAL

## 2023-07-07 VITALS
HEIGHT: 76 IN | SYSTOLIC BLOOD PRESSURE: 112 MMHG | WEIGHT: 207.31 LBS | DIASTOLIC BLOOD PRESSURE: 76 MMHG | OXYGEN SATURATION: 97 % | HEART RATE: 86 BPM | RESPIRATION RATE: 18 BRPM | TEMPERATURE: 97 F | BODY MASS INDEX: 25.24 KG/M2

## 2023-07-07 DIAGNOSIS — E11.9 TYPE 2 DIABETES MELLITUS WITHOUT COMPLICATION, WITHOUT LONG-TERM CURRENT USE OF INSULIN: Primary | ICD-10-CM

## 2023-07-07 DIAGNOSIS — I10 BENIGN ESSENTIAL HTN: ICD-10-CM

## 2023-07-07 DIAGNOSIS — N52.9 ERECTILE DYSFUNCTION, UNSPECIFIED ERECTILE DYSFUNCTION TYPE: ICD-10-CM

## 2023-07-07 PROCEDURE — 3061F NEG MICROALBUMINURIA REV: CPT | Mod: CPTII,S$GLB,, | Performed by: STUDENT IN AN ORGANIZED HEALTH CARE EDUCATION/TRAINING PROGRAM

## 2023-07-07 PROCEDURE — 3066F PR DOCUMENTATION OF TREATMENT FOR NEPHROPATHY: ICD-10-PCS | Mod: CPTII,S$GLB,, | Performed by: STUDENT IN AN ORGANIZED HEALTH CARE EDUCATION/TRAINING PROGRAM

## 2023-07-07 PROCEDURE — 99999 PR PBB SHADOW E&M-EST. PATIENT-LVL IV: CPT | Mod: PBBFAC,,, | Performed by: STUDENT IN AN ORGANIZED HEALTH CARE EDUCATION/TRAINING PROGRAM

## 2023-07-07 PROCEDURE — 3078F PR MOST RECENT DIASTOLIC BLOOD PRESSURE < 80 MM HG: ICD-10-PCS | Mod: CPTII,S$GLB,, | Performed by: STUDENT IN AN ORGANIZED HEALTH CARE EDUCATION/TRAINING PROGRAM

## 2023-07-07 PROCEDURE — 1159F MED LIST DOCD IN RCRD: CPT | Mod: CPTII,S$GLB,, | Performed by: STUDENT IN AN ORGANIZED HEALTH CARE EDUCATION/TRAINING PROGRAM

## 2023-07-07 PROCEDURE — 4010F ACE/ARB THERAPY RXD/TAKEN: CPT | Mod: CPTII,S$GLB,, | Performed by: STUDENT IN AN ORGANIZED HEALTH CARE EDUCATION/TRAINING PROGRAM

## 2023-07-07 PROCEDURE — 3008F PR BODY MASS INDEX (BMI) DOCUMENTED: ICD-10-PCS | Mod: CPTII,S$GLB,, | Performed by: STUDENT IN AN ORGANIZED HEALTH CARE EDUCATION/TRAINING PROGRAM

## 2023-07-07 PROCEDURE — 1160F PR REVIEW ALL MEDS BY PRESCRIBER/CLIN PHARMACIST DOCUMENTED: ICD-10-PCS | Mod: CPTII,S$GLB,, | Performed by: STUDENT IN AN ORGANIZED HEALTH CARE EDUCATION/TRAINING PROGRAM

## 2023-07-07 PROCEDURE — 3061F PR NEG MICROALBUMINURIA RESULT DOCUMENTED/REVIEW: ICD-10-PCS | Mod: CPTII,S$GLB,, | Performed by: STUDENT IN AN ORGANIZED HEALTH CARE EDUCATION/TRAINING PROGRAM

## 2023-07-07 PROCEDURE — 3044F HG A1C LEVEL LT 7.0%: CPT | Mod: CPTII,S$GLB,, | Performed by: STUDENT IN AN ORGANIZED HEALTH CARE EDUCATION/TRAINING PROGRAM

## 2023-07-07 PROCEDURE — 3074F PR MOST RECENT SYSTOLIC BLOOD PRESSURE < 130 MM HG: ICD-10-PCS | Mod: CPTII,S$GLB,, | Performed by: STUDENT IN AN ORGANIZED HEALTH CARE EDUCATION/TRAINING PROGRAM

## 2023-07-07 PROCEDURE — 3078F DIAST BP <80 MM HG: CPT | Mod: CPTII,S$GLB,, | Performed by: STUDENT IN AN ORGANIZED HEALTH CARE EDUCATION/TRAINING PROGRAM

## 2023-07-07 PROCEDURE — 1160F RVW MEDS BY RX/DR IN RCRD: CPT | Mod: CPTII,S$GLB,, | Performed by: STUDENT IN AN ORGANIZED HEALTH CARE EDUCATION/TRAINING PROGRAM

## 2023-07-07 PROCEDURE — 4010F PR ACE/ARB THEARPY RXD/TAKEN: ICD-10-PCS | Mod: CPTII,S$GLB,, | Performed by: STUDENT IN AN ORGANIZED HEALTH CARE EDUCATION/TRAINING PROGRAM

## 2023-07-07 PROCEDURE — 3066F NEPHROPATHY DOC TX: CPT | Mod: CPTII,S$GLB,, | Performed by: STUDENT IN AN ORGANIZED HEALTH CARE EDUCATION/TRAINING PROGRAM

## 2023-07-07 PROCEDURE — 1159F PR MEDICATION LIST DOCUMENTED IN MEDICAL RECORD: ICD-10-PCS | Mod: CPTII,S$GLB,, | Performed by: STUDENT IN AN ORGANIZED HEALTH CARE EDUCATION/TRAINING PROGRAM

## 2023-07-07 PROCEDURE — 3008F BODY MASS INDEX DOCD: CPT | Mod: CPTII,S$GLB,, | Performed by: STUDENT IN AN ORGANIZED HEALTH CARE EDUCATION/TRAINING PROGRAM

## 2023-07-07 PROCEDURE — 99214 OFFICE O/P EST MOD 30 MIN: CPT | Mod: S$GLB,,, | Performed by: STUDENT IN AN ORGANIZED HEALTH CARE EDUCATION/TRAINING PROGRAM

## 2023-07-07 PROCEDURE — 99999 PR PBB SHADOW E&M-EST. PATIENT-LVL IV: ICD-10-PCS | Mod: PBBFAC,,, | Performed by: STUDENT IN AN ORGANIZED HEALTH CARE EDUCATION/TRAINING PROGRAM

## 2023-07-07 PROCEDURE — 99214 PR OFFICE/OUTPT VISIT, EST, LEVL IV, 30-39 MIN: ICD-10-PCS | Mod: S$GLB,,, | Performed by: STUDENT IN AN ORGANIZED HEALTH CARE EDUCATION/TRAINING PROGRAM

## 2023-07-07 PROCEDURE — 3074F SYST BP LT 130 MM HG: CPT | Mod: CPTII,S$GLB,, | Performed by: STUDENT IN AN ORGANIZED HEALTH CARE EDUCATION/TRAINING PROGRAM

## 2023-07-07 PROCEDURE — 3044F PR MOST RECENT HEMOGLOBIN A1C LEVEL <7.0%: ICD-10-PCS | Mod: CPTII,S$GLB,, | Performed by: STUDENT IN AN ORGANIZED HEALTH CARE EDUCATION/TRAINING PROGRAM

## 2023-07-07 RX ORDER — SILDENAFIL 50 MG/1
50 TABLET, FILM COATED ORAL DAILY PRN
Qty: 15 TABLET | Refills: 5 | Status: ON HOLD | OUTPATIENT
Start: 2023-07-07 | End: 2023-11-06 | Stop reason: CLARIF

## 2023-07-07 NOTE — PROGRESS NOTES
"Subjective:       Patient ID: Alberto Song is a 47 y.o. male.    Chief Complaint: Follow-up (4 week f/u )    HPI:  47 y.o. male presents to [facility] for 4 week follow-up visit    Acute concerns?:     HTN:  Home BP log?: 130s-140s systolic, 80s diastolic  Medications: lisinopril 20 mg  Compliance?: Taking only lisinopril at this time    Diet?: Has been following diet more closely. Restricting carbohydrates at this time. [id number] calories per day    Exercise?: Walking 1 mile per day    History of MI, abnormal stress, anginal chest pain, stent, NTG use?: No    Review of Systems   Constitutional:  Negative for chills, diaphoresis, fatigue and fever.   HENT:  Negative for congestion, sinus pressure, sneezing and sore throat.    Respiratory:  Negative for cough and shortness of breath.    Cardiovascular:  Negative for chest pain and palpitations.   Gastrointestinal:  Negative for abdominal pain, diarrhea, nausea and vomiting.   Skin:  Negative for rash and wound.   Neurological:  Negative for dizziness and headaches.     Objective:      Vitals:    07/07/23 0817   BP: 112/76   BP Location: Left arm   Patient Position: Sitting   BP Method: Medium (Manual)   Pulse: 86   Resp: 18   Temp: 96.8 °F (36 °C)   TempSrc: Temporal   SpO2: 97%   Weight: 94 kg (207 lb 5.5 oz)   Height: 6' 4" (1.93 m)     Physical Exam  Vitals reviewed.   Constitutional:       General: He is not in acute distress.     Appearance: Normal appearance. He is not ill-appearing.   HENT:      Head: Normocephalic and atraumatic.   Eyes:      General:         Right eye: No discharge.         Left eye: No discharge.      Conjunctiva/sclera: Conjunctivae normal.   Cardiovascular:      Rate and Rhythm: Normal rate.      Pulses:           Dorsalis pedis pulses are 2+ on the right side and 2+ on the left side.   Pulmonary:      Effort: Pulmonary effort is normal.   Musculoskeletal:         General: No deformity.   Feet:      Right foot:      Protective " "Sensation: 6 sites tested.  6 sites sensed.      Skin integrity: Skin integrity normal.      Toenail Condition: Right toenails are normal.      Left foot:      Protective Sensation: 6 sites tested.  6 sites sensed.      Skin integrity: Skin integrity normal.      Toenail Condition: Left toenails are normal.   Skin:     Coloration: Skin is not jaundiced or pale.   Neurological:      General: No focal deficit present.      Mental Status: He is alert and oriented to person, place, and time.   Psychiatric:         Mood and Affect: Mood normal.         Behavior: Behavior normal.           Lab Results   Component Value Date     06/08/2023    K 4.0 07/06/2023     06/08/2023    CO2 22 (L) 06/08/2023    BUN 13 06/08/2023    CREATININE 0.9 06/08/2023    ANIONGAP 10 06/08/2023     Lab Results   Component Value Date    HGBA1C 5.6 06/08/2023    HGBA1C 5.6 06/08/2023     No results found for: BNP, BNPTRIAGEBLO    Lab Results   Component Value Date    WBC 4.24 06/08/2023    HGB 13.2 (L) 06/08/2023    HCT 41.1 06/08/2023    HCT 45 01/06/2023     06/08/2023    GRAN 2.0 06/08/2023    GRAN 47.8 06/08/2023     Lab Results   Component Value Date    CHOL 173 06/08/2023    HDL 42 06/08/2023    LDLCALC 117.2 06/08/2023    TRIG 69 06/08/2023          Current Outpatient Medications:     blood sugar diagnostic Strp, 1 strip by Misc.(Non-Drug; Combo Route) route 2 (two) times daily., Disp: 100 strip, Rfl: 0    insulin syringe-needle U-100 0.5 mL 31 gauge x 5/16" Syrg, Insulin infection 5 x a day, Disp: 100 each, Rfl: 3    ketoconazole (NIZORAL) 2 % cream, Apply topically once daily., Disp: 60 g, Rfl: 1    lisinopriL (PRINIVIL,ZESTRIL) 20 MG tablet, Take 1 tablet (20 mg total) by mouth once daily., Disp: 90 tablet, Rfl: 1    metFORMIN (GLUCOPHAGE-XR) 500 MG ER 24hr tablet, Take 2 tablets (1,000 mg total) by mouth 2 (two) times daily with meals., Disp: 360 tablet, Rfl: 3    pen needle, diabetic 31 gauge x 1/4" Ndle, 1 " applicator by Misc.(Non-Drug; Combo Route) route 4 (four) times daily., Disp: 100 each, Rfl: 20    empagliflozin (JARDIANCE) 25 mg tablet, Take 1 tablet (25 mg total) by mouth once daily., Disp: 90 tablet, Rfl: 3    rosuvastatin (CRESTOR) 10 MG tablet, Take 1 tablet (10 mg total) by mouth once daily. (Patient not taking: Reported on 7/7/2023), Disp: 90 tablet, Rfl: 3    sildenafiL (VIAGRA) 50 MG tablet, Take 1 tablet (50 mg total) by mouth daily as needed for Erectile Dysfunction., Disp: 15 tablet, Rfl: 5        Assessment:       1. Type 2 diabetes mellitus without complication, without long-term current use of insulin    2. Benign essential HTN    3. Erectile dysfunction, unspecified erectile dysfunction type           Plan:       Type 2 diabetes mellitus without complication, without long-term current use of insulin  Benign essential HTN  -     empagliflozin (JARDIANCE) 25 mg tablet; Take 1 tablet (25 mg total) by mouth once daily.  Dispense: 90 tablet; Refill: 3  - Diet and exercise reviewed today's visit    Erectile dysfunction, unspecified erectile dysfunction type  -     sildenafiL (VIAGRA) 50 MG tablet; Take 1 tablet (50 mg total) by mouth daily as needed for Erectile Dysfunction.  Dispense: 15 tablet; Refill: 5  -     Testosterone; Future; Expected date: 07/07/2023  -     Side effects discussed today's visit. Will notify EMS/provider if experiencing chest pain and has taken within 24 hours. Will present to ED for erection lasting longer than 4 hours. Possible color vision effect while taking medication.      RTC in 6 months

## 2023-11-04 ENCOUNTER — HOSPITAL ENCOUNTER (INPATIENT)
Facility: HOSPITAL | Age: 48
LOS: 6 days | Discharge: HOME HEALTH | DRG: 433 | End: 2023-11-10
Attending: EMERGENCY MEDICINE | Admitting: HOSPITALIST
Payer: COMMERCIAL

## 2023-11-04 DIAGNOSIS — F33.1 MDD (MAJOR DEPRESSIVE DISORDER), RECURRENT EPISODE, MODERATE: ICD-10-CM

## 2023-11-04 DIAGNOSIS — E87.6 HYPOKALEMIA: ICD-10-CM

## 2023-11-04 DIAGNOSIS — F10.939 ALCOHOL WITHDRAWAL: ICD-10-CM

## 2023-11-04 DIAGNOSIS — K70.9 ALCOHOLIC LIVER DISEASE: ICD-10-CM

## 2023-11-04 DIAGNOSIS — F10.20 ALCOHOL USE DISORDER, SEVERE, DEPENDENCE: ICD-10-CM

## 2023-11-04 DIAGNOSIS — E11.9 TYPE 2 DIABETES MELLITUS WITHOUT COMPLICATION, WITHOUT LONG-TERM CURRENT USE OF INSULIN: ICD-10-CM

## 2023-11-04 DIAGNOSIS — N39.41 URGE INCONTINENCE OF URINE: ICD-10-CM

## 2023-11-04 DIAGNOSIS — S09.90XA CLOSED HEAD INJURY, INITIAL ENCOUNTER: ICD-10-CM

## 2023-11-04 DIAGNOSIS — K85.20 ALCOHOL-INDUCED ACUTE PANCREATITIS WITHOUT INFECTION OR NECROSIS: ICD-10-CM

## 2023-11-04 DIAGNOSIS — R26.2 UNABLE TO AMBULATE: ICD-10-CM

## 2023-11-04 DIAGNOSIS — D61.818 PANCYTOPENIA: ICD-10-CM

## 2023-11-04 DIAGNOSIS — R00.0 TACHYCARDIA: ICD-10-CM

## 2023-11-04 DIAGNOSIS — F10.10 ALCOHOL ABUSE: Primary | ICD-10-CM

## 2023-11-04 DIAGNOSIS — R07.9 CHEST PAIN: ICD-10-CM

## 2023-11-04 DIAGNOSIS — Z71.89 GRIEF COUNSELING: ICD-10-CM
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
## 2023-11-04 LAB
ALBUMIN SERPL BCP-MCNC: 2.1 G/DL (ref 3.5–5.2)
ALBUMIN SERPL BCP-MCNC: 2.5 G/DL (ref 3.5–5.2)
ALLENS TEST: ABNORMAL
ALP SERPL-CCNC: 254 U/L (ref 55–135)
ALP SERPL-CCNC: 296 U/L (ref 55–135)
ALT SERPL W/O P-5'-P-CCNC: 100 U/L (ref 10–44)
ALT SERPL W/O P-5'-P-CCNC: 109 U/L (ref 10–44)
AMPHET+METHAMPHET UR QL: NEGATIVE
ANION GAP SERPL CALC-SCNC: 21 MMOL/L (ref 8–16)
ANION GAP SERPL CALC-SCNC: 23 MMOL/L (ref 8–16)
AST SERPL-CCNC: 622 U/L (ref 10–40)
AST SERPL-CCNC: 666 U/L (ref 10–40)
BACTERIA #/AREA URNS AUTO: ABNORMAL /HPF
BARBITURATES UR QL SCN>200 NG/ML: NEGATIVE
BASOPHILS # BLD AUTO: 0.03 K/UL (ref 0–0.2)
BASOPHILS NFR BLD: 1.2 % (ref 0–1.9)
BENZODIAZ UR QL SCN>200 NG/ML: NEGATIVE
BILIRUB SERPL-MCNC: 6.5 MG/DL (ref 0.1–1)
BILIRUB SERPL-MCNC: 8.7 MG/DL (ref 0.1–1)
BILIRUB UR QL STRIP: ABNORMAL
BUN SERPL-MCNC: 7 MG/DL (ref 6–20)
BUN SERPL-MCNC: 7 MG/DL (ref 6–20)
BZE UR QL SCN: NEGATIVE
CALCIUM SERPL-MCNC: 6.5 MG/DL (ref 8.7–10.5)
CALCIUM SERPL-MCNC: 7.5 MG/DL (ref 8.7–10.5)
CANNABINOIDS UR QL SCN: NEGATIVE
CHLORIDE SERPL-SCNC: 101 MMOL/L (ref 95–110)
CHLORIDE SERPL-SCNC: 93 MMOL/L (ref 95–110)
CLARITY UR REFRACT.AUTO: CLEAR
CO2 SERPL-SCNC: 17 MMOL/L (ref 23–29)
CO2 SERPL-SCNC: 20 MMOL/L (ref 23–29)
COLOR UR AUTO: ABNORMAL
CREAT SERPL-MCNC: 0.4 MG/DL (ref 0.5–1.4)
CREAT SERPL-MCNC: 0.5 MG/DL (ref 0.5–1.4)
CREAT UR-MCNC: 70 MG/DL (ref 23–375)
DIFFERENTIAL METHOD: ABNORMAL
EOSINOPHIL # BLD AUTO: 0 K/UL (ref 0–0.5)
EOSINOPHIL NFR BLD: 0.4 % (ref 0–8)
ERYTHROCYTE [DISTWIDTH] IN BLOOD BY AUTOMATED COUNT: 15.9 % (ref 11.5–14.5)
EST. GFR  (NO RACE VARIABLE): >60 ML/MIN/1.73 M^2
EST. GFR  (NO RACE VARIABLE): >60 ML/MIN/1.73 M^2
ESTIMATED AVG GLUCOSE: 117 MG/DL (ref 68–131)
ETHANOL SERPL-MCNC: 205 MG/DL
GLUCOSE SERPL-MCNC: 135 MG/DL (ref 70–110)
GLUCOSE SERPL-MCNC: 89 MG/DL (ref 70–110)
GLUCOSE UR QL STRIP: NEGATIVE
HBA1C MFR BLD: 5.7 % (ref 4–5.6)
HCO3 UR-SCNC: 24.7 MMOL/L (ref 24–28)
HCO3 UR-SCNC: 25.2 MMOL/L (ref 24–28)
HCT VFR BLD AUTO: 25.5 % (ref 40–54)
HCT VFR BLD CALC: 25 %PCV (ref 36–54)
HGB BLD-MCNC: 8.7 G/DL (ref 14–18)
HGB UR QL STRIP: ABNORMAL
HYALINE CASTS UR QL AUTO: 3 /LPF
IMM GRANULOCYTES # BLD AUTO: 0.02 K/UL (ref 0–0.04)
IMM GRANULOCYTES NFR BLD AUTO: 0.8 % (ref 0–0.5)
INR PPP: 1.3 (ref 0.8–1.2)
KETONES UR QL STRIP: ABNORMAL
LACTATE SERPL-SCNC: 4.6 MMOL/L (ref 0.5–2.2)
LACTATE SERPL-SCNC: 6.8 MMOL/L (ref 0.5–2.2)
LACTATE SERPL-SCNC: 7.4 MMOL/L (ref 0.5–2.2)
LDH SERPL L TO P-CCNC: 5.9 MMOL/L (ref 0.5–2.2)
LDH SERPL L TO P-CCNC: 7.28 MMOL/L (ref 0.5–2.2)
LEUKOCYTE ESTERASE UR QL STRIP: NEGATIVE
LYMPHOCYTES # BLD AUTO: 0.6 K/UL (ref 1–4.8)
LYMPHOCYTES NFR BLD: 22.7 % (ref 18–48)
MAGNESIUM SERPL-MCNC: 1.4 MG/DL (ref 1.6–2.6)
MCH RBC QN AUTO: 34.8 PG (ref 27–31)
MCHC RBC AUTO-ENTMCNC: 34.1 G/DL (ref 32–36)
MCV RBC AUTO: 102 FL (ref 82–98)
METHADONE UR QL SCN>300 NG/ML: NEGATIVE
MICROSCOPIC COMMENT: ABNORMAL
MONOCYTES # BLD AUTO: 0.2 K/UL (ref 0.3–1)
MONOCYTES NFR BLD: 8 % (ref 4–15)
NEUTROPHILS # BLD AUTO: 1.7 K/UL (ref 1.8–7.7)
NEUTROPHILS NFR BLD: 66.9 % (ref 38–73)
NITRITE UR QL STRIP: NEGATIVE
NRBC BLD-RTO: 0 /100 WBC
OPIATES UR QL SCN: NEGATIVE
PCO2 BLDA: 28 MMHG (ref 35–45)
PCO2 BLDA: 28.4 MMHG (ref 35–45)
PCO2 BLDA: 29.8 MMHG (ref 35–45)
PCP UR QL SCN>25 NG/ML: NEGATIVE
PH SMN: 7.52 [PH] (ref 7.35–7.45)
PH SMN: 7.55 [PH] (ref 7.35–7.45)
PH SMN: 7.56 [PH] (ref 7.35–7.45)
PH UR STRIP: 6 [PH] (ref 5–8)
PLATELET # BLD AUTO: 61 K/UL (ref 150–450)
PMV BLD AUTO: 10.7 FL (ref 9.2–12.9)
PO2 BLDA: 49 MMHG (ref 40–60)
PO2 BLDA: 57 MMHG (ref 40–60)
PO2 BLDA: 60 MMHG (ref 40–60)
POC BE: 2 MMOL/L
POC BE: 2 MMOL/L
POC BE: 3 MMOL/L
POC IONIZED CALCIUM: 0.97 MMOL/L (ref 1.06–1.42)
POC SATURATED O2: 89 % (ref 95–100)
POC SATURATED O2: 93 % (ref 95–100)
POC SATURATED O2: 94 % (ref 95–100)
POC TCO2: 26 MMOL/L (ref 24–29)
POC TCO2: 26 MMOL/L (ref 24–29)
POTASSIUM BLD-SCNC: 2.6 MMOL/L (ref 3.5–5.1)
POTASSIUM SERPL-SCNC: 2.6 MMOL/L (ref 3.5–5.1)
POTASSIUM SERPL-SCNC: 2.8 MMOL/L (ref 3.5–5.1)
PROCALCITONIN SERPL IA-MCNC: 0.71 NG/ML
PROT SERPL-MCNC: 4.5 G/DL (ref 6–8.4)
PROT SERPL-MCNC: 4.8 G/DL (ref 6–8.4)
PROT UR QL STRIP: ABNORMAL
PROTHROMBIN TIME: 13.9 SEC (ref 9–12.5)
RBC # BLD AUTO: 2.5 M/UL (ref 4.6–6.2)
RBC #/AREA URNS AUTO: 8 /HPF (ref 0–4)
SALICYLATES SERPL-MCNC: <5 MG/DL (ref 15–30)
SAMPLE: ABNORMAL
SITE: ABNORMAL
SODIUM BLD-SCNC: 134 MMOL/L (ref 136–145)
SODIUM SERPL-SCNC: 136 MMOL/L (ref 136–145)
SODIUM SERPL-SCNC: 139 MMOL/L (ref 136–145)
SP GR UR STRIP: 1.01 (ref 1–1.03)
SQUAMOUS #/AREA URNS AUTO: 0 /HPF
TOXICOLOGY INFORMATION: NORMAL
TSH SERPL DL<=0.005 MIU/L-ACNC: 1.81 UIU/ML (ref 0.4–4)
URN SPEC COLLECT METH UR: ABNORMAL
VIT B12 SERPL-MCNC: 1127 PG/ML (ref 210–950)
WBC # BLD AUTO: 2.51 K/UL (ref 3.9–12.7)
WBC #/AREA URNS AUTO: 2 /HPF (ref 0–5)

## 2023-11-04 PROCEDURE — 93010 EKG 12-LEAD: ICD-10-PCS | Mod: ,,, | Performed by: INTERNAL MEDICINE

## 2023-11-04 PROCEDURE — 82330 ASSAY OF CALCIUM: CPT

## 2023-11-04 PROCEDURE — 93005 ELECTROCARDIOGRAM TRACING: CPT

## 2023-11-04 PROCEDURE — 80179 DRUG ASSAY SALICYLATE: CPT

## 2023-11-04 PROCEDURE — 99900035 HC TECH TIME PER 15 MIN (STAT)

## 2023-11-04 PROCEDURE — 85610 PROTHROMBIN TIME: CPT | Performed by: EMERGENCY MEDICINE

## 2023-11-04 PROCEDURE — 93010 ELECTROCARDIOGRAM REPORT: CPT | Mod: ,,, | Performed by: INTERNAL MEDICINE

## 2023-11-04 PROCEDURE — 80307 DRUG TEST PRSMV CHEM ANLYZR: CPT | Performed by: EMERGENCY MEDICINE

## 2023-11-04 PROCEDURE — 82803 BLOOD GASES ANY COMBINATION: CPT

## 2023-11-04 PROCEDURE — 80053 COMPREHEN METABOLIC PANEL: CPT | Mod: 91

## 2023-11-04 PROCEDURE — 25000003 PHARM REV CODE 250: Performed by: FAMILY MEDICINE

## 2023-11-04 PROCEDURE — 84132 ASSAY OF SERUM POTASSIUM: CPT

## 2023-11-04 PROCEDURE — 84443 ASSAY THYROID STIM HORMONE: CPT | Performed by: FAMILY MEDICINE

## 2023-11-04 PROCEDURE — 82607 VITAMIN B-12: CPT | Performed by: EMERGENCY MEDICINE

## 2023-11-04 PROCEDURE — 96361 HYDRATE IV INFUSION ADD-ON: CPT

## 2023-11-04 PROCEDURE — 99285 EMERGENCY DEPT VISIT HI MDM: CPT | Mod: 25

## 2023-11-04 PROCEDURE — 25000003 PHARM REV CODE 250: Performed by: EMERGENCY MEDICINE

## 2023-11-04 PROCEDURE — 12000002 HC ACUTE/MED SURGE SEMI-PRIVATE ROOM

## 2023-11-04 PROCEDURE — 85025 COMPLETE CBC W/AUTO DIFF WBC: CPT | Performed by: EMERGENCY MEDICINE

## 2023-11-04 PROCEDURE — 83605 ASSAY OF LACTIC ACID: CPT

## 2023-11-04 PROCEDURE — 80320 DRUG SCREEN QUANTALCOHOLS: CPT

## 2023-11-04 PROCEDURE — 96368 THER/DIAG CONCURRENT INF: CPT

## 2023-11-04 PROCEDURE — 96365 THER/PROPH/DIAG IV INF INIT: CPT

## 2023-11-04 PROCEDURE — 81001 URINALYSIS AUTO W/SCOPE: CPT | Mod: XB | Performed by: EMERGENCY MEDICINE

## 2023-11-04 PROCEDURE — 96366 THER/PROPH/DIAG IV INF ADDON: CPT

## 2023-11-04 PROCEDURE — 82077 ASSAY SPEC XCP UR&BREATH IA: CPT | Performed by: EMERGENCY MEDICINE

## 2023-11-04 PROCEDURE — 83605 ASSAY OF LACTIC ACID: CPT | Mod: 91

## 2023-11-04 PROCEDURE — 84145 PROCALCITONIN (PCT): CPT | Performed by: FAMILY MEDICINE

## 2023-11-04 PROCEDURE — 83605 ASSAY OF LACTIC ACID: CPT | Performed by: EMERGENCY MEDICINE

## 2023-11-04 PROCEDURE — 83036 HEMOGLOBIN GLYCOSYLATED A1C: CPT | Performed by: FAMILY MEDICINE

## 2023-11-04 PROCEDURE — 63600175 PHARM REV CODE 636 W HCPCS: Performed by: EMERGENCY MEDICINE

## 2023-11-04 PROCEDURE — 80053 COMPREHEN METABOLIC PANEL: CPT | Performed by: EMERGENCY MEDICINE

## 2023-11-04 PROCEDURE — 85014 HEMATOCRIT: CPT

## 2023-11-04 PROCEDURE — 83735 ASSAY OF MAGNESIUM: CPT

## 2023-11-04 PROCEDURE — 84295 ASSAY OF SERUM SODIUM: CPT

## 2023-11-04 PROCEDURE — 63600175 PHARM REV CODE 636 W HCPCS: Performed by: FAMILY MEDICINE

## 2023-11-04 PROCEDURE — 25000003 PHARM REV CODE 250

## 2023-11-04 PROCEDURE — 96375 TX/PRO/DX INJ NEW DRUG ADDON: CPT

## 2023-11-04 PROCEDURE — 63600175 PHARM REV CODE 636 W HCPCS: Mod: JG

## 2023-11-04 RX ORDER — ALBUTEROL SULFATE 90 UG/1
2 AEROSOL, METERED RESPIRATORY (INHALATION) EVERY 6 HOURS PRN
Status: DISCONTINUED | OUTPATIENT
Start: 2023-11-04 | End: 2023-11-10 | Stop reason: HOSPADM

## 2023-11-04 RX ORDER — INSULIN ASPART 100 [IU]/ML
0-5 INJECTION, SOLUTION INTRAVENOUS; SUBCUTANEOUS
Status: DISCONTINUED | OUTPATIENT
Start: 2023-11-04 | End: 2023-11-10 | Stop reason: HOSPADM

## 2023-11-04 RX ORDER — LORAZEPAM 1 MG/1
2 TABLET ORAL EVERY 4 HOURS PRN
Status: DISCONTINUED | OUTPATIENT
Start: 2023-11-04 | End: 2023-11-04

## 2023-11-04 RX ORDER — PHENOBARBITAL SODIUM 130 MG/ML
130 INJECTION, SOLUTION INTRAMUSCULAR; INTRAVENOUS
Status: COMPLETED | OUTPATIENT
Start: 2023-11-04 | End: 2023-11-04

## 2023-11-04 RX ORDER — CALCIUM GLUCONATE 20 MG/ML
1 INJECTION, SOLUTION INTRAVENOUS
Status: COMPLETED | OUTPATIENT
Start: 2023-11-04 | End: 2023-11-04

## 2023-11-04 RX ORDER — DIAZEPAM 10 MG/2ML
5 INJECTION INTRAMUSCULAR
Status: COMPLETED | OUTPATIENT
Start: 2023-11-04 | End: 2023-11-04

## 2023-11-04 RX ORDER — MAG HYDROX/ALUMINUM HYD/SIMETH 200-200-20
30 SUSPENSION, ORAL (FINAL DOSE FORM) ORAL 4 TIMES DAILY PRN
Status: DISCONTINUED | OUTPATIENT
Start: 2023-11-04 | End: 2023-11-10 | Stop reason: HOSPADM

## 2023-11-04 RX ORDER — POTASSIUM CHLORIDE 20 MEQ/1
40 TABLET, EXTENDED RELEASE ORAL ONCE
Status: COMPLETED | OUTPATIENT
Start: 2023-11-04 | End: 2023-11-04

## 2023-11-04 RX ORDER — DIAZEPAM 5 MG/1
5 TABLET ORAL EVERY 8 HOURS
Status: DISCONTINUED | OUTPATIENT
Start: 2023-11-05 | End: 2023-11-06

## 2023-11-04 RX ORDER — IBUPROFEN 200 MG
24 TABLET ORAL
Status: DISCONTINUED | OUTPATIENT
Start: 2023-11-04 | End: 2023-11-10 | Stop reason: HOSPADM

## 2023-11-04 RX ORDER — TALC
6 POWDER (GRAM) TOPICAL NIGHTLY PRN
Status: DISCONTINUED | OUTPATIENT
Start: 2023-11-04 | End: 2023-11-10 | Stop reason: HOSPADM

## 2023-11-04 RX ORDER — SODIUM CHLORIDE 0.9 % (FLUSH) 0.9 %
10 SYRINGE (ML) INJECTION EVERY 12 HOURS PRN
Status: DISCONTINUED | OUTPATIENT
Start: 2023-11-04 | End: 2023-11-10 | Stop reason: HOSPADM

## 2023-11-04 RX ORDER — IBUPROFEN 200 MG
1 TABLET ORAL
Status: DISCONTINUED | OUTPATIENT
Start: 2023-11-04 | End: 2023-11-05

## 2023-11-04 RX ORDER — ONDANSETRON 2 MG/ML
4 INJECTION INTRAMUSCULAR; INTRAVENOUS EVERY 8 HOURS PRN
Status: DISCONTINUED | OUTPATIENT
Start: 2023-11-04 | End: 2023-11-10 | Stop reason: HOSPADM

## 2023-11-04 RX ORDER — POTASSIUM CHLORIDE 7.45 MG/ML
10 INJECTION INTRAVENOUS
Status: COMPLETED | OUTPATIENT
Start: 2023-11-04 | End: 2023-11-05

## 2023-11-04 RX ORDER — THIAMINE HCL 100 MG
100 TABLET ORAL DAILY
Status: DISCONTINUED | OUTPATIENT
Start: 2023-11-05 | End: 2023-11-06

## 2023-11-04 RX ORDER — MAGNESIUM SULFATE HEPTAHYDRATE 40 MG/ML
2 INJECTION, SOLUTION INTRAVENOUS ONCE
Status: COMPLETED | OUTPATIENT
Start: 2023-11-04 | End: 2023-11-04

## 2023-11-04 RX ORDER — LORAZEPAM 2 MG/ML
2 INJECTION INTRAMUSCULAR
Status: DISCONTINUED | OUTPATIENT
Start: 2023-11-05 | End: 2023-11-09

## 2023-11-04 RX ORDER — NALOXONE HCL 0.4 MG/ML
0.02 VIAL (ML) INJECTION
Status: DISCONTINUED | OUTPATIENT
Start: 2023-11-04 | End: 2023-11-10 | Stop reason: HOSPADM

## 2023-11-04 RX ORDER — OXYCODONE HYDROCHLORIDE 5 MG/1
5 TABLET ORAL EVERY 6 HOURS PRN
Status: DISCONTINUED | OUTPATIENT
Start: 2023-11-04 | End: 2023-11-10 | Stop reason: HOSPADM

## 2023-11-04 RX ORDER — CALCIUM GLUCONATE 94 MG/ML
1 INJECTION, SOLUTION INTRAVENOUS ONCE
Status: COMPLETED | OUTPATIENT
Start: 2023-11-05 | End: 2023-11-05

## 2023-11-04 RX ORDER — IBUPROFEN 200 MG
16 TABLET ORAL
Status: DISCONTINUED | OUTPATIENT
Start: 2023-11-04 | End: 2023-11-10 | Stop reason: HOSPADM

## 2023-11-04 RX ORDER — FOLIC ACID 1 MG/1
1 TABLET ORAL DAILY
Status: DISCONTINUED | OUTPATIENT
Start: 2023-11-05 | End: 2023-11-10 | Stop reason: HOSPADM

## 2023-11-04 RX ORDER — GLUCAGON 1 MG
1 KIT INJECTION
Status: DISCONTINUED | OUTPATIENT
Start: 2023-11-04 | End: 2023-11-10 | Stop reason: HOSPADM

## 2023-11-04 RX ADMIN — DIAZEPAM 5 MG: 10 INJECTION, SOLUTION INTRAMUSCULAR; INTRAVENOUS at 01:11

## 2023-11-04 RX ADMIN — THIAMINE HYDROCHLORIDE 100 MG: 100 INJECTION, SOLUTION INTRAMUSCULAR; INTRAVENOUS at 02:11

## 2023-11-04 RX ADMIN — SODIUM CHLORIDE 250 ML: 9 INJECTION, SOLUTION INTRAVENOUS at 10:11

## 2023-11-04 RX ADMIN — SODIUM CHLORIDE 1000 ML: 9 INJECTION, SOLUTION INTRAVENOUS at 05:11

## 2023-11-04 RX ADMIN — POTASSIUM CHLORIDE 10 MEQ: 7.46 INJECTION, SOLUTION INTRAVENOUS at 10:11

## 2023-11-04 RX ADMIN — PHENOBARBITAL SODIUM 130 MG: 130 INJECTION INTRAMUSCULAR at 09:11

## 2023-11-04 RX ADMIN — POTASSIUM CHLORIDE 40 MEQ: 1500 TABLET, EXTENDED RELEASE ORAL at 05:11

## 2023-11-04 RX ADMIN — CALCIUM GLUCONATE 1 G: 20 INJECTION, SOLUTION INTRAVENOUS at 03:11

## 2023-11-04 RX ADMIN — MAGNESIUM SULFATE HEPTAHYDRATE 2 G: 40 INJECTION, SOLUTION INTRAVENOUS at 05:11

## 2023-11-04 RX ADMIN — POTASSIUM CHLORIDE 40 MEQ: 1500 TABLET, EXTENDED RELEASE ORAL at 10:11

## 2023-11-04 RX ADMIN — SODIUM CHLORIDE, POTASSIUM CHLORIDE, SODIUM LACTATE AND CALCIUM CHLORIDE 1000 ML: 600; 310; 30; 20 INJECTION, SOLUTION INTRAVENOUS at 03:11

## 2023-11-04 NOTE — ED TRIAGE NOTES
Alberto Song, a 48 y.o. male presents to the ED w/ complaint of alcohol withdrawal     Triage note:  Chief Complaint   Patient presents with    Alcohol Problem     Daily drinker, had a fall yesterday, was seen at [place], roommate thinks patient might have had a seizure this am but is unsure. Last drink an earlier today      Review of patient's allergies indicates:  No Known Allergies  Past Medical History:   Diagnosis Date    Asthma     Diabetes mellitus     Hyperlipidemia     Hypertension     Sleep apnea, unspecified     Type 2 diabetes mellitus without complication, without long-term current use of insulin 6/6/2023

## 2023-11-04 NOTE — PROVIDER PROGRESS NOTES - EMERGENCY DEPT.
Encounter Date: 11/4/2023    ED Physician Progress Notes        Physician Note:   47 Y/O diabetic, cigarette smoker, and daily ETOH uses/abuser M presents via EMS reporting generalized weakness, fall with closed head injury yesterday and difficulty ambulating secondary to gait instability.    At time of sign-out patient was pending complete CT scans of his head and max face.  Additionally was pending labs and urine.    Labs returned with worsening lactic acid, CT head was without any acute abnormalities.  No fracture on CT max face.  Given worsening lactic acidosis and significant abnormalities of his CMP patient was given further electrolytes and IV fluids.  Remained without any hemodynamic compromise, remained without withdrawal symptoms at this time.    During ED course patient became symptomatic from alcohol we gave him some phenobarb for symptom control.  Additionally patient had downtrending lactic acid after further IV fluids and was stable for admission to step-down floor.  Ordered repeat labs to follow up on his electrolytes to evaluate for further repletion.    [facility] to admit for EtOH withdrawal and electrolyte control.

## 2023-11-04 NOTE — ED PROVIDER NOTES
Encounter Date: 11/4/2023       History     Chief Complaint   Patient presents with    Alcohol Problem     Daily drinker, had a fall yesterday, was seen at [place], roommate thinks patient might have had a seizure this am but is unsure. Last drink an earlier today      HPI  47 Y/O diabetic, cigarette smoker, and daily ETOH uses/abuser M presents via EMS reporting generalized weakness, fall with closed head injury yesterday and difficulty ambulating secondary to gait instability.  No reported chest/back/abdominal pain.  He reports several months of worsening sensory loss to bilateral feet.  Triage reported questionable seizure activity, however he denies any falls or new injuries as use been on the sofa since he returned from hospital yesterday given his feeling of generalized lightheadedness and generalized weakness.  No headache, visual changes, ipsilateral numbness or weakness, vomiting/nausea or reported recent illness.    Review of patient's allergies indicates:  No Known Allergies  Past Medical History:   Diagnosis Date    Asthma     Diabetes mellitus     Hyperlipidemia     Hypertension     Sleep apnea, unspecified     Type 2 diabetes mellitus without complication, without long-term current use of insulin 6/6/2023     Past Surgical History:   Procedure Laterality Date    LAPAROSCOPIC CHOLECYSTECTOMY N/A 2/8/2023    Procedure: CHOLECYSTECTOMY, LAPAROSCOPIC;  Surgeon: Nilo Tobias MD;  Location: [facility] OR;  Service: General;  Laterality: N/A;     Family History   Problem Relation Age of Onset    Cancer Mother     Early death Mother     Hypertension Father      Social History     Tobacco Use    Smoking status: Every Day     Current packs/day: 1.00     Average packs/day: 1 pack/day for 32.0 years (32.0 ttl pk-yrs)     Types: Cigarettes    Smokeless tobacco: Never   Substance Use Topics    Alcohol use: Not Currently     Comment: One 750ml per day    Drug use: Never     Review of Systems  + general  malaise  + generalized weakness    Physical Exam     Initial Vitals [11/04/23 1304]   BP Pulse Resp Temp SpO2   (!) 144/88 94 18 98.8 °F (37.1 °C) 100 %      MAP       --         Vitals:    11/04/23 1304 11/04/23 1446   BP: (!) 144/88 136/72   Pulse: 94 92   Resp: 18 20   Temp: 98.8 °F (37.1 °C)    TempSrc: Oral    SpO2: 100% 100%   Weight: 94 kg (207 lb 3.7 oz)        Physical Exam  GENERAL:  Anxious, but Cooperative; Well-appearing and Non-Toxic; Well-Nourished; NAD.  HEENT: AT/NC; anicteric sclera; no ocular proptosis; + right periocular ecchymosis; no appreciated hyphema or subconjunctival hemorrhage to right cornea or sclera; pupils equally round and reactive to light with intact extraocular movements; acuity and fields grossly intact; speaking full sentences with no slurring of speech or drooling/inability to tolerate oral secretions.  NECK: Supple, FROM with no meningismus, no accessory muscle use.   THORAX/BACK: Atraumatic with NTTP. No midline TTP to C/T/LS spine; No CVA tenderness B/L.   HEART: Regular rate and rhythm, no M/G/T.  LUNGS: No Tachypnea, No Retractions, and CTA B/L with no W/R/R.  ABDOMEN: Soft, ND, NTTP. No rigidity. No guarding. NEG Holder's, Rovsing's, or McBurney's point tenderness.  EXTREMITIES:  Stable pelvis.  FROM.  No appreciated resting or intentional tremors; no appreciated clonus to bilateral lower extremities.  Strength 5/5. + diminished proprioception 2 lower extremities and subjective paresthesias to bilateral plantar surface.  SKIN: Warm, Dry, No Skin Tears or Rashes.  VASCULAR: 2+ pulses Prox/Dist & Symmetrical with No delay.  NEUROLOGIC: AAOx3; answering questions appropriate with no focal deficits    ED Course   Procedures  Labs Reviewed   CBC W/ AUTO DIFFERENTIAL - Abnormal; Notable for the following components:       Result Value    WBC 2.51 (*)     RBC 2.50 (*)     Hemoglobin 8.7 (*)     Hematocrit 25.5 (*)      (*)     MCH 34.8 (*)     RDW 15.9 (*)     Platelets  61 (*)     Immature Granulocytes 0.8 (*)     Gran # (ANC) 1.7 (*)     Lymph # 0.6 (*)     Mono # 0.2 (*)     All other components within normal limits   COMPREHENSIVE METABOLIC PANEL - Abnormal; Notable for the following components:    Potassium 2.8 (*)     CO2 17 (*)     Creatinine 0.4 (*)     Calcium 6.5 (*)     Total Protein 4.5 (*)     Albumin 2.1 (*)     Total Bilirubin 6.5 (*)     Alkaline Phosphatase 254 (*)      (*)      (*)     Anion Gap 21 (*)     All other components within normal limits   PROTIME-INR - Abnormal; Notable for the following components:    Prothrombin Time 13.9 (*)     INR 1.3 (*)     All other components within normal limits   LACTIC ACID, PLASMA - Abnormal; Notable for the following components:    Lactate (Lactic Acid) 6.8 (*)     All other components within normal limits   ALCOHOL,MEDICAL (ETHANOL) - Abnormal; Notable for the following components:    Alcohol, Serum 205 (*)     All other components within normal limits   VITAMIN B12 - Abnormal; Notable for the following components:    Vitamin B-12 1127 (*)     All other components within normal limits   ISTAT PROCEDURE - Abnormal; Notable for the following components:    POC PH 7.563 (*)     POC PCO2 28.0 (*)     POC BE 3 (*)     All other components within normal limits   URINALYSIS, REFLEX TO URINE CULTURE   DRUG SCREEN PANEL, URINE EMERGENCY          Imaging Results              CT Maxillofacial Without Contrast (In process)  Result time 11/04/23 15:31:33                     CT Head Without Contrast (Final result)  Result time 11/04/23 15:31:23      Final result by Familia Carrion MD (11/04/23 15:31:23)                   Impression:      1. No acute intracranial process.  2. Superficial right periorbital contusion/hematoma.  3. Mild paranasal sinus disease.      Electronically signed by: Familia Carrion  Date:    11/04/2023  Time:    15:31               Narrative:    EXAMINATION:  CT HEAD WITHOUT CONTRAST    CLINICAL  HISTORY:  Trauma;    TECHNIQUE:  Low dose axial CT images obtained throughout the head without intravenous contrast. Sagittal and coronal reconstructions were performed.    COMPARISON:  None.    FINDINGS:  Intracranial compartment:    Ventricles and sulci are normal in size for age without evidence of hydrocephalus. No extra-axial blood or fluid collections.    Moderate involutional changes and chronic microvascular ischemic changes in the periventricular white matter.    Superficial right periorbital contusion/hematoma.    Globes are within normal limits.  No parenchymal mass, hemorrhage, edema or major vascular distribution infarct.    Skull/extracranial contents (limited evaluation): No fracture. Mild right ethmoid and right maxillary sinus disease.                                       CT Cervical Spine Without Contrast (In process)  Result time 11/04/23 15:44:21                     X-Ray Chest 1 View (Final result)  Result time 11/04/23 14:43:00      Final result by Debi Watson MD (11/04/23 14:43:00)                   Impression:      No acute intrathoracic process seen.      Electronically signed by: Debi Watson MD  Date:    11/04/2023  Time:    14:43               Narrative:    EXAMINATION:  XR CHEST 1 VIEW    CLINICAL HISTORY:  r/o bleeding or hemorrhage;    TECHNIQUE:  Single frontal view of the chest was performed.    COMPARISON:  01/09/2023    FINDINGS:  The cardiac silhouette is normal.  The pulmonary vascularity is normal.    Very minimal atelectasis at the lung basis.  No focal airspace disease.    No pleural effusion.  No pneumothorax.    The osseous structures appear normal.                                       X-Ray Pelvis Routine AP (Final result)  Result time 11/04/23 14:42:31      Final result by Geeta Jang MD (11/04/23 14:42:31)                   Impression:      As above.      Electronically signed by: Geeta Jang MD  Date:    11/04/2023  Time:    14:42                Narrative:    EXAMINATION:  XR PELVIS ROUTINE AP    CLINICAL HISTORY:  r/o bleeding or hemorrhage;    TECHNIQUE:  AP view of the pelvis was performed.    COMPARISON:  None.    FINDINGS:  No acute fracture or bony destructive process identified in this single projection.  There are degenerative changes of visualized lower lumbar spine.  Incidental note is made of mild diffuse gaseous distension of visualized intestinal loops and a moderate amount of stool in the visualized colon.                                       Medications   nicotine 21 mg/24 hr 1 patch (0 patches Transdermal Hold 11/4/23 1415)   lactated ringers bolus 1,000 mL (1,000 mLs Intravenous New Bag 11/4/23 1534)   potassium chloride SA CR tablet 40 mEq (has no administration in time range)   calcium gluconate 1 g in NS IVPB (premixed) (has no administration in time range)   magnesium sulfate 2g in water 50mL IVPB (premix) (has no administration in time range)   thiamine (B-1) 100 mg in dextrose 5 % (D5W) 100 mL IVPB (0 mg Intravenous Stopped 11/4/23 1446)   diazePAM injection 5 mg (5 mg Intravenous Given 11/4/23 1352)     Medical Decision Making  Afebrile, hemodynamically stable neurovascularly intact male presenting with right periocular ecchymosis in the setting of alcohol abuse reporting general malaise and fatigue.  No airway or respiratory instability.  No focal neurological deficits.  This time periocular ecchymosis, no appreciated intra-ocular injury/trauma/pathology appreciated on exam.  No suspicion for retrobulbar pathology or evidence of entrapment that may warrant emergent intervention.  He has a history of diabetes and alcohol abuse warranting labs given his likely significant osmotic diuresis from both pathologies.  Will perform labs, imaging and likely admit.  ____________________  Jordan Cain MD, FAAEM  Emergency Medicine Staff  1:44 PM 11/4/2023    F/U:  Transfer care to incoming team to follow-up imaging, reassessment and  likely admission.  ____________________  Jordan Cain MD, FAAEM  Emergency Medicine Staff  3:00 PM 11/4/2023          Amount and/or Complexity of Data Reviewed  Labs: ordered.  Radiology: ordered.    Risk  OTC drugs.  Prescription drug management.                               Clinical Impression:   Final diagnoses:  [W19.XXXA] Fall  [F10.10] Alcohol abuse (Primary)  [D61.818] Pancytopenia  [S09.90XA] Closed head injury, initial encounter  [E87.6] Hypokalemia  [R26.2] Unable to ambulate               Rudy Cain MD  11/04/23 3681

## 2023-11-05 PROBLEM — D61.818 PANCYTOPENIA: Status: ACTIVE | Noted: 2023-11-05

## 2023-11-05 LAB
ALBUMIN SERPL BCP-MCNC: 2.4 G/DL (ref 3.5–5.2)
ALBUMIN SERPL BCP-MCNC: 2.5 G/DL (ref 3.5–5.2)
ALBUMIN SERPL BCP-MCNC: 2.5 G/DL (ref 3.5–5.2)
ALP SERPL-CCNC: 291 U/L (ref 55–135)
ALP SERPL-CCNC: 297 U/L (ref 55–135)
ALP SERPL-CCNC: 302 U/L (ref 55–135)
ALT SERPL W/O P-5'-P-CCNC: 104 U/L (ref 10–44)
ALT SERPL W/O P-5'-P-CCNC: 86 U/L (ref 10–44)
ALT SERPL W/O P-5'-P-CCNC: 96 U/L (ref 10–44)
ANION GAP SERPL CALC-SCNC: 13 MMOL/L (ref 8–16)
ANION GAP SERPL CALC-SCNC: 15 MMOL/L (ref 8–16)
ANION GAP SERPL CALC-SCNC: 16 MMOL/L (ref 8–16)
APTT PPP: 26.2 SEC (ref 21–32)
AST SERPL-CCNC: 366 U/L (ref 10–40)
AST SERPL-CCNC: 451 U/L (ref 10–40)
AST SERPL-CCNC: 557 U/L (ref 10–40)
BASOPHILS # BLD AUTO: 0.02 K/UL (ref 0–0.2)
BASOPHILS NFR BLD: 0.8 % (ref 0–1.9)
BILIRUB SERPL-MCNC: 10.8 MG/DL (ref 0.1–1)
BILIRUB SERPL-MCNC: 11.1 MG/DL (ref 0.1–1)
BILIRUB SERPL-MCNC: 9.7 MG/DL (ref 0.1–1)
BUN SERPL-MCNC: 7 MG/DL (ref 6–20)
BUN SERPL-MCNC: 7 MG/DL (ref 6–20)
BUN SERPL-MCNC: 8 MG/DL (ref 6–20)
CALCIUM SERPL-MCNC: 7.5 MG/DL (ref 8.7–10.5)
CALCIUM SERPL-MCNC: 7.6 MG/DL (ref 8.7–10.5)
CALCIUM SERPL-MCNC: 7.6 MG/DL (ref 8.7–10.5)
CHLORIDE SERPL-SCNC: 93 MMOL/L (ref 95–110)
CHLORIDE SERPL-SCNC: 95 MMOL/L (ref 95–110)
CHLORIDE SERPL-SCNC: 95 MMOL/L (ref 95–110)
CO2 SERPL-SCNC: 22 MMOL/L (ref 23–29)
CO2 SERPL-SCNC: 27 MMOL/L (ref 23–29)
CO2 SERPL-SCNC: 27 MMOL/L (ref 23–29)
CREAT SERPL-MCNC: 0.5 MG/DL (ref 0.5–1.4)
CREAT SERPL-MCNC: 0.5 MG/DL (ref 0.5–1.4)
CREAT SERPL-MCNC: 0.6 MG/DL (ref 0.5–1.4)
DIFFERENTIAL METHOD: ABNORMAL
EOSINOPHIL # BLD AUTO: 0 K/UL (ref 0–0.5)
EOSINOPHIL NFR BLD: 0.8 % (ref 0–8)
ERYTHROCYTE [DISTWIDTH] IN BLOOD BY AUTOMATED COUNT: 15.1 % (ref 11.5–14.5)
EST. GFR  (NO RACE VARIABLE): >60 ML/MIN/1.73 M^2
FERRITIN SERPL-MCNC: ABNORMAL NG/ML (ref 20–300)
FOLATE SERPL-MCNC: 2.2 NG/ML (ref 4–24)
GLUCOSE SERPL-MCNC: 121 MG/DL (ref 70–110)
GLUCOSE SERPL-MCNC: 133 MG/DL (ref 70–110)
GLUCOSE SERPL-MCNC: 142 MG/DL (ref 70–110)
HCT VFR BLD AUTO: 22.1 % (ref 40–54)
HGB BLD-MCNC: 7.6 G/DL (ref 14–18)
IMM GRANULOCYTES # BLD AUTO: 0.03 K/UL (ref 0–0.04)
IMM GRANULOCYTES NFR BLD AUTO: 1.2 % (ref 0–0.5)
INR PPP: 1.3 (ref 0.8–1.2)
IRON SERPL-MCNC: 168 UG/DL (ref 45–160)
LACTATE SERPL-SCNC: 3 MMOL/L (ref 0.5–2.2)
LYMPHOCYTES # BLD AUTO: 0.7 K/UL (ref 1–4.8)
LYMPHOCYTES NFR BLD: 27.5 % (ref 18–48)
MAGNESIUM SERPL-MCNC: 1.4 MG/DL (ref 1.6–2.6)
MAGNESIUM SERPL-MCNC: 1.5 MG/DL (ref 1.6–2.6)
MCH RBC QN AUTO: 33.8 PG (ref 27–31)
MCHC RBC AUTO-ENTMCNC: 34.4 G/DL (ref 32–36)
MCV RBC AUTO: 98 FL (ref 82–98)
MONOCYTES # BLD AUTO: 0.1 K/UL (ref 0.3–1)
MONOCYTES NFR BLD: 5.6 % (ref 4–15)
NEUTROPHILS # BLD AUTO: 1.6 K/UL (ref 1.8–7.7)
NEUTROPHILS NFR BLD: 64.1 % (ref 38–73)
NRBC BLD-RTO: 1 /100 WBC
PHOSPHATE SERPL-MCNC: 1.4 MG/DL (ref 2.7–4.5)
PHOSPHATE SERPL-MCNC: 1.4 MG/DL (ref 2.7–4.5)
PHOSPHATE SERPL-MCNC: 2 MG/DL (ref 2.7–4.5)
PLATELET # BLD AUTO: 54 K/UL (ref 150–450)
PMV BLD AUTO: 11.1 FL (ref 9.2–12.9)
POCT GLUCOSE: 138 MG/DL (ref 70–110)
POCT GLUCOSE: 148 MG/DL (ref 70–110)
POCT GLUCOSE: 189 MG/DL (ref 70–110)
POTASSIUM SERPL-SCNC: 2.7 MMOL/L (ref 3.5–5.1)
POTASSIUM SERPL-SCNC: 2.8 MMOL/L (ref 3.5–5.1)
POTASSIUM SERPL-SCNC: 2.9 MMOL/L (ref 3.5–5.1)
PROT SERPL-MCNC: 4.7 G/DL (ref 6–8.4)
PROT SERPL-MCNC: 4.8 G/DL (ref 6–8.4)
PROT SERPL-MCNC: 4.9 G/DL (ref 6–8.4)
PROTHROMBIN TIME: 13.5 SEC (ref 9–12.5)
RBC # BLD AUTO: 2.25 M/UL (ref 4.6–6.2)
SATURATED IRON: 92 % (ref 20–50)
SODIUM SERPL-SCNC: 133 MMOL/L (ref 136–145)
SODIUM SERPL-SCNC: 135 MMOL/L (ref 136–145)
SODIUM SERPL-SCNC: 135 MMOL/L (ref 136–145)
TOTAL IRON BINDING CAPACITY: 182 UG/DL (ref 250–450)
TRANSFERRIN SERPL-MCNC: 123 MG/DL (ref 200–375)
WBC # BLD AUTO: 2.51 K/UL (ref 3.9–12.7)

## 2023-11-05 PROCEDURE — 20600001 HC STEP DOWN PRIVATE ROOM

## 2023-11-05 PROCEDURE — 36415 COLL VENOUS BLD VENIPUNCTURE: CPT | Performed by: FAMILY MEDICINE

## 2023-11-05 PROCEDURE — 85610 PROTHROMBIN TIME: CPT | Performed by: FAMILY MEDICINE

## 2023-11-05 PROCEDURE — 99900035 HC TECH TIME PER 15 MIN (STAT)

## 2023-11-05 PROCEDURE — S4991 NICOTINE PATCH NONLEGEND: HCPCS | Performed by: EMERGENCY MEDICINE

## 2023-11-05 PROCEDURE — 25000003 PHARM REV CODE 250: Performed by: STUDENT IN AN ORGANIZED HEALTH CARE EDUCATION/TRAINING PROGRAM

## 2023-11-05 PROCEDURE — 25000003 PHARM REV CODE 250: Performed by: INTERNAL MEDICINE

## 2023-11-05 PROCEDURE — 84100 ASSAY OF PHOSPHORUS: CPT | Mod: 91 | Performed by: FAMILY MEDICINE

## 2023-11-05 PROCEDURE — 84207 ASSAY OF VITAMIN B-6: CPT | Performed by: FAMILY MEDICINE

## 2023-11-05 PROCEDURE — 25000003 PHARM REV CODE 250: Performed by: HOSPITALIST

## 2023-11-05 PROCEDURE — 82746 ASSAY OF FOLIC ACID SERUM: CPT | Performed by: FAMILY MEDICINE

## 2023-11-05 PROCEDURE — 83605 ASSAY OF LACTIC ACID: CPT | Performed by: FAMILY MEDICINE

## 2023-11-05 PROCEDURE — 84100 ASSAY OF PHOSPHORUS: CPT | Performed by: HOSPITALIST

## 2023-11-05 PROCEDURE — 25000003 PHARM REV CODE 250: Performed by: EMERGENCY MEDICINE

## 2023-11-05 PROCEDURE — 80053 COMPREHEN METABOLIC PANEL: CPT | Mod: 91 | Performed by: FAMILY MEDICINE

## 2023-11-05 PROCEDURE — 83735 ASSAY OF MAGNESIUM: CPT | Mod: 91 | Performed by: FAMILY MEDICINE

## 2023-11-05 PROCEDURE — 94761 N-INVAS EAR/PLS OXIMETRY MLT: CPT

## 2023-11-05 PROCEDURE — 82728 ASSAY OF FERRITIN: CPT | Performed by: FAMILY MEDICINE

## 2023-11-05 PROCEDURE — 83540 ASSAY OF IRON: CPT | Performed by: FAMILY MEDICINE

## 2023-11-05 PROCEDURE — 25000003 PHARM REV CODE 250: Performed by: FAMILY MEDICINE

## 2023-11-05 PROCEDURE — 84466 ASSAY OF TRANSFERRIN: CPT | Performed by: FAMILY MEDICINE

## 2023-11-05 PROCEDURE — 80053 COMPREHEN METABOLIC PANEL: CPT | Performed by: FAMILY MEDICINE

## 2023-11-05 PROCEDURE — 63600175 PHARM REV CODE 636 W HCPCS: Performed by: FAMILY MEDICINE

## 2023-11-05 PROCEDURE — 85025 COMPLETE CBC W/AUTO DIFF WBC: CPT | Performed by: FAMILY MEDICINE

## 2023-11-05 PROCEDURE — 85730 THROMBOPLASTIN TIME PARTIAL: CPT | Performed by: HOSPITALIST

## 2023-11-05 PROCEDURE — S4991 NICOTINE PATCH NONLEGEND: HCPCS | Performed by: STUDENT IN AN ORGANIZED HEALTH CARE EDUCATION/TRAINING PROGRAM

## 2023-11-05 PROCEDURE — 94660 CPAP INITIATION&MGMT: CPT

## 2023-11-05 RX ORDER — IBUPROFEN 200 MG
1 TABLET ORAL DAILY
Status: DISCONTINUED | OUTPATIENT
Start: 2023-11-05 | End: 2023-11-10 | Stop reason: HOSPADM

## 2023-11-05 RX ORDER — IBUPROFEN 200 MG
1 TABLET ORAL DAILY
Status: DISCONTINUED | OUTPATIENT
Start: 2023-11-06 | End: 2023-11-05

## 2023-11-05 RX ADMIN — CALCIUM GLUCONATE 1000 MG: 98 INJECTION, SOLUTION INTRAVENOUS at 01:11

## 2023-11-05 RX ADMIN — THERA TABS 1 TABLET: TAB at 09:11

## 2023-11-05 RX ADMIN — FOLIC ACID 1 MG: 1 TABLET ORAL at 09:11

## 2023-11-05 RX ADMIN — CIPROFLOXACIN HYDROCHLORIDE: 3 OINTMENT OPHTHALMIC at 04:11

## 2023-11-05 RX ADMIN — Medication 1 PATCH: at 01:11

## 2023-11-05 RX ADMIN — DIAZEPAM 5 MG: 5 TABLET ORAL at 09:11

## 2023-11-05 RX ADMIN — DIAZEPAM 5 MG: 5 TABLET ORAL at 06:11

## 2023-11-05 RX ADMIN — DIAZEPAM 5 MG: 5 TABLET ORAL at 01:11

## 2023-11-05 RX ADMIN — THIAMINE HCL TAB 100 MG 100 MG: 100 TAB at 09:11

## 2023-11-05 RX ADMIN — SODIUM CHLORIDE 250 ML: 9 INJECTION, SOLUTION INTRAVENOUS at 07:11

## 2023-11-05 RX ADMIN — CIPROFLOXACIN HYDROCHLORIDE: 3 OINTMENT OPHTHALMIC at 09:11

## 2023-11-05 RX ADMIN — Medication 1 PATCH: at 07:11

## 2023-11-05 RX ADMIN — LORAZEPAM 2 MG: 2 INJECTION INTRAMUSCULAR; INTRAVENOUS at 11:11

## 2023-11-05 RX ADMIN — POTASSIUM CHLORIDE 10 MEQ: 7.46 INJECTION, SOLUTION INTRAVENOUS at 01:11

## 2023-11-05 NOTE — CARE UPDATE
"RAPID RESPONSE NURSE CHART REVIEW        Chart Reviewed: 11/05/2023, 08:55 AM    MRN: [id number]  Bed: 8074/8048 A    Dx: Alcoholic liver disease    Alberto Song has a past medical history of Asthma, Diabetes mellitus, Hyperlipidemia, Hypertension, Sleep apnea, unspecified, and Type 2 diabetes mellitus without complication, without long-term current use of insulin.    Last VS: BP (!) 162/96 (BP Location: Left arm, Patient Position: Lying)   Pulse 86   Temp 98.8 °F (37.1 °C) (Oral)   Resp 18   Ht 6' 4" (1.93 m)   Wt 90.8 kg (200 lb 3.2 oz)   SpO2 98%   BMI 24.37 kg/m²     24H Vital Sign Range:  Temp:  [98.1 °F (36.7 °C)-98.9 °F (37.2 °C)]   Pulse:  []   Resp:  [14-20]   BP: (123-164)/(72-96)   SpO2:  [96 %-100 %]     Level of Consciousness (AVPU): alert    Recent Labs     11/04/23  1357 11/04/23  2224 11/05/23  0133   WBC 2.51*  --  2.51*   HGB 8.7*  --  7.6*   HCT 25.5* 25* 22.1*   PLT 61*  --  54*       Recent Labs     11/04/23  2128 11/05/23  0034 11/05/23  0908    135* 135*   K 2.6* 2.8* 2.9*   CL 93* 93* 95   CO2 20* 27 27   BUN 7 8 7   CREATININE 0.5 0.6 0.5   * 133* 121*   PHOS  --  2.0* 1.4*   MG 1.4*  --  1.5*        Recent Labs     11/04/23  1402 11/04/23  2131 11/04/23  2224   PH 7.563* 7.521* 7.547*   PCO2 28.0* 29.8* 28.4*   PO2 60 49 57   HCO3 25.2  --  24.7   POCSATURATED 94 89 93   BE 3* 2 2        MEWS score: 1    Rounding completed with charge YARA Chinchilla. Tachycardia improved. Lactic trending down, last was 3. Instructed to call [phone] for further concerns or assistance.    Roxane Ba RN        "

## 2023-11-05 NOTE — PLAN OF CARE
Problem: Adult Inpatient Plan of Care  Goal: Plan of Care Review  Outcome: Ongoing, Progressing  Flowsheets (Taken 11/5/2023 0401)  Plan of Care Reviewed With: patient  Goal: Absence of Hospital-Acquired Illness or Injury  Outcome: Ongoing, Progressing  Goal: Optimal Comfort and Wellbeing  Outcome: Ongoing, Progressing  Goal: Readiness for Transition of Care  Outcome: Ongoing, Progressing     Problem: Skin Injury Risk Increased  Goal: Skin Health and Integrity  Outcome: Ongoing, Progressing     Problem: Diabetes Comorbidity  Goal: Blood Glucose Level Within Targeted Range  Outcome: Ongoing, Progressing     Problem: Impaired Wound Healing  Goal: Optimal Wound Healing  Outcome: Ongoing, Progressing     Bed in low position,  upper rails up x 2, call light in reach, bed alarm set,no s/s of pain, no s/s of acute distress noted. Patient awake in bed using his cell phone.Plan of care ongoing.

## 2023-11-05 NOTE — CONSULTS
Plan of Care:    Spoke with primary who were amenable to full consult being deferred to Monday, 11/6/23.    Agree with continuing CIWA precautions and Valium taper at this time.     Please reach out if any urgent psychiatric needs arise.    Shane Mathias MD  [facility] PGY2

## 2023-11-05 NOTE — CONSULTS
"  [facility] - Telemetry Stepdown  Adult Nutrition  Consult Note    SUMMARY     Recommendations    1. Continue low Na diet. Add DM modifier as needed.     2. Continue Boost Glucose Control vanilla TID.     3. Continue thiamine supplementation, MVI, and folic acid as needed.    Goals: Meet % PO intake by RD f/u.  Nutrition Goal Status: new  Communication of RD Recs: other (comment) (POC)    Assessment and Plan    Nutrition Problem  Inadequate oral intake    Related to (etiology):   Alcoholic liver disease    Signs and Symptoms (as evidenced by):   Poor PO intake  Poor appetite  Weight loss     Interventions/Recommendations (treatment strategy):  Collaboration of nutrition care with other providers    Nutrition Diagnosis Status:   New     Reason for Assessment    Reason For Assessment: consult  Diagnosis: liver disease  Relevant Medical History: ETOH use disorder, T2DM, HTN  Interdisciplinary Rounds: did not attend  General Information Comments: RD consulted for decreased PO intake and malnutrition. Unable to speak with patient - pt requesting nursing assistence at time of RD visit. Patient reports he was NPO for US. Unable to perform NFPE. Per wt hx, weight loss of 7 lbs x 4 months (~3% body weight). Per chart, patient reported 100 lbs weight loss x 3 months.  Patient has decreased appetite and PO intake with ETOH use daily. Had a fall wi th head injury, and has gait instability. Glu high, A1c WNL. At risk for malnutrition - unable to diagnose at this time.  Nutrition Discharge Planning: Pending clinical course    Nutrition Risk Screen    Nutrition Risk Screen: no indicators present    Nutrition/Diet History    Food Allergies: NKFA    Anthropometrics    Temp: 98.8 °F (37.1 °C)  Height Method: Stated  Height: 6' 4" (193 cm)  Height (inches): 76 in  Weight Method: Bed Scale  Weight: 90.8 kg (200 lb 3.2 oz)  Weight (lb): 200.2 lb  Ideal Body Weight (IBW), Male: 202 lb  % Ideal Body Weight, Male (lb): 99.11 %  BMI " (Calculated): 24.4  BMI Grade: 18.5-24.9 - normal       Lab/Procedures/Meds    Pertinent Labs Reviewed: reviewed  Pertinent Labs Comments: Na 135, K 2.9, Glu 121, Ca 7.6, P 1.4, Mg 1.5, , Tpro 4.9, Alb 2.5, Tbili 11.1, , ALT 96, WBC 2.51, RBC 2.25, H/H 7.6/22.1, Plt 54  Pertinent Medications Reviewed: reviewed  Pertinent Medications Comments: folic acid, MVI, thiamine    Estimated/Assessed Needs    Weight Used For Calorie Calculations: 90.8 kg (200 lb 2.8 oz)  Energy Calorie Requirements (kcal): 1880 kcal/d  Energy Need Method: Calvert-St Jeor  Protein Requirements: 73 g pro (0.8 g/kg)  Weight Used For Protein Calculations: 90.8 kg (200 lb 2.8 oz)  Fluid Requirements (mL): 1 ml/kcal or per MD     RDA Method (mL): 1880  CHO Requirement: 235 g CHO      Nutrition Prescription Ordered    Current Diet Order: Low Na  Oral Nutrition Supplement: Boost Glucose Control Vanilla TID    Evaluation of Received Nutrient/Fluid Intake    I/O: +1.1 L since admit  Comments: LBM: 11/4  Tolerance: tolerating  % Intake of Estimated Energy Needs: Other: unable to assess - not documented  % Meal Intake: Other: CRISTIN    Nutrition Risk    Level of Risk/Frequency of Follow-up:  (1x/wk)       Monitor and Evaluation    Food and Nutrient Intake: energy intake, food and beverage intake  Food and Nutrient Adminstration: diet order  Knowledge/Beliefs/Attitudes: food and nutrition knowledge/skill  Physical Activity and Function: factors affecting access to physical activity  Anthropometric Measurements: height/length, weight, weight change, body mass index  Biochemical Data, Medical Tests and Procedures: electrolyte and renal panel, gastrointestinal profile, glucose/endocrine profile, inflammatory profile, lipid profile  Nutrition-Focused Physical Findings: overall appearance, extremities, muscles and bones, head and eyes, skin       Nutrition Follow-Up    RD Follow-up?: Yes

## 2023-11-05 NOTE — AI DETERIORATION ALERT
RAPID RESPONSE NURSE PROACTIVE ROUNDING NOTE       Time of Visit:     Admit Date: 2023  LOS: 0  Code Status: Full Code   Date of Visit: 2023  : 1975  Age: 48 y.o.  Sex: male  Race: White  Bed: ED :   MRN: [id number]  Was the patient discharged from an ICU this admission? No   Was the patient discharged from a PACU within last 24 hours? No   Did the patient receive conscious sedation/general anesthesia in last 24 hours? No  Was the patient in the ED within the past 24 hours? No  Was the patient on NIPPV within the past 24 hours? No   Attending Physician: Shakira Fajardo MD  Primary Service: [facility]   Time spent at the bedside: 15 -30 min    SITUATION    Notified by bedside RN via phone call, AI alert.  Reason for alert: tachycardia, elevated lactic acid  Called to evaluate the patient for Circulatory    BACKGROUND     Why is the patient in the hospital?: <principal problem not specified>    Patient has a past medical history of Asthma, Diabetes mellitus, Hyperlipidemia, Hypertension, Sleep apnea, unspecified, and Type 2 diabetes mellitus without complication, without long-term current use of insulin.    Last Vitals:  Temp: 98.7 °F (37.1 °C) (2215)  Pulse: 119 (2215)  Resp: 16 (2215)  BP: 123/83 (2215)  SpO2: 97 % (2215)    24 Hours Vitals Range:  Temp:  [98.3 °F (36.8 °C)-98.8 °F (37.1 °C)]   Pulse:  []   Resp:  [14-20]   BP: (123-145)/(72-88)   SpO2:  [97 %-100 %]     Labs:  Recent Labs     23  1357 23   WBC 2.51*  --    HGB 8.7*  --    HCT 25.5* 25*   PLT 61*  --        Recent Labs     23  1357 238    136   K 2.8* 2.6*    93*   CO2 17* 20*   BUN 7 7   CREATININE 0.4* 0.5   GLU 89 135*   MG  --  1.4*        Recent Labs     23  1402 23  2131 23  2224   PH 7.563* 7.521* 7.547*   PCO2 28.0* 29.8* 28.4*   PO2 60 49 57   HCO3 25.2  --  24.7   POCSATURATED 94 89 93   BE 3* 2 2         ASSESSMENT    Physical Exam  Constitutional:       Appearance: He is ill-appearing and toxic-appearing.      Comments: Disheveled appearance    Eyes:      General: Scleral icterus present.      Comments: Bruising and swelling to R eye/periorbital region   Cardiovascular:      Rate and Rhythm: Tachycardia present.      Pulses: Normal pulses.   Pulmonary:      Breath sounds: Normal breath sounds.   Skin:     General: Skin is warm and moist.      Coloration: Skin is jaundiced.      Findings: Bruising present.   Neurological:      Mental Status: He is alert and oriented to person, place, and time.      GCS: GCS eye subscore is 4. GCS verbal subscore is 5. GCS motor subscore is 6.   Psychiatric:         Mood and Affect: Mood is anxious.         Behavior: Behavior is cooperative.       /83 (98)  RR 16, SpO2 97% on room air  Temp 98.7  CIWA 11    Patient awake and alert, lying in stretcher. Denies any current nausea or tactile/auditory disturbances. Very mild headache. Ate a sandwich earlier with no nausea  Has received 1L bolus x2 since arrival    INTERVENTIONS    The patient was seen for Cardiac problem. Staff concerns included tachycardia. The following interventions were performed: continuous cardiac monitoring.  Medical problem. Staff concerns included critical lab abnormality. The following interventions were performed: POCT venous blood gas with lytes, lactic.    K+ 2.6  Lactic 5.9, down from 7.4 at 2021    250cc NS bolus ordered, K+ 10mEq x2 doses ordered by MD    RECOMMENDATIONS    Follow up with repeat labs  Maintain continuous cardiac monitoring  Administer PRN medication as indicated for elevated CIWA    PROVIDER ESCALATION    Yes/No  Yes    Orders received and case discussed with Dr. Richter .    Disposition:  tx to [facility] bed when ready    FOLLOW-UP    bedside RNEle  updated on plan of care. Instructed to call the Rapid Response NurseMARIAELENA RN at [phone] for additional questions  or concerns.

## 2023-11-05 NOTE — SUBJECTIVE & OBJECTIVE
"Past Medical History:   Diagnosis Date    Asthma     Diabetes mellitus     Hyperlipidemia     Hypertension     Sleep apnea, unspecified     Type 2 diabetes mellitus without complication, without long-term current use of insulin 6/6/2023       Past Surgical History:   Procedure Laterality Date    LAPAROSCOPIC CHOLECYSTECTOMY N/A 2/8/2023    Procedure: CHOLECYSTECTOMY, LAPAROSCOPIC;  Surgeon: Nilo Tobias MD;  Location: [facility];  Service: General;  Laterality: N/A;       Review of patient's allergies indicates:  No Known Allergies    No current facility-administered medications on file prior to encounter.     Current Outpatient Medications on File Prior to Encounter   Medication Sig    empagliflozin (JARDIANCE) 25 mg tablet Take 1 tablet (25 mg total) by mouth once daily.    insulin syringe-needle U-100 0.5 mL 31 gauge x 5/16" Syrg Insulin infection 5 x a day    ketoconazole (NIZORAL) 2 % cream Apply topically once daily.    lisinopriL (PRINIVIL,ZESTRIL) 20 MG tablet Take 1 tablet (20 mg total) by mouth once daily.    metFORMIN (GLUCOPHAGE-XR) 500 MG ER 24hr tablet Take 2 tablets (1,000 mg total) by mouth 2 (two) times daily with meals.    pen needle, diabetic 31 gauge x 1/4" Ndle 1 applicator by Misc.(Non-Drug; Combo Route) route 4 (four) times daily.    rosuvastatin (CRESTOR) 10 MG tablet Take 1 tablet (10 mg total) by mouth once daily. (Patient not taking: Reported on 7/7/2023)    sildenafiL (VIAGRA) 50 MG tablet Take 1 tablet (50 mg total) by mouth daily as needed for Erectile Dysfunction.     Family History       Problem Relation (Age of Onset)    Cancer Mother    Early death Mother    Hypertension Father          Tobacco Use    Smoking status: Every Day     Current packs/day: 1.00     Average packs/day: 1 pack/day for 32.0 years (32.0 ttl pk-yrs)     Types: Cigarettes    Smokeless tobacco: Never   Substance and Sexual Activity    Alcohol use: Not Currently     Comment: One 750ml per day    Drug use: " Never    Sexual activity: Not Currently     Partners: Female     Birth control/protection: None     Review of Systems   Constitutional:  Positive for activity change, appetite change, fatigue and unexpected weight change. Negative for chills, diaphoresis and fever.   HENT:  Negative for sore throat and trouble swallowing.    Eyes:  Negative for photophobia and visual disturbance.   Respiratory:  Negative for cough, shortness of breath and wheezing.    Cardiovascular:  Negative for chest pain, palpitations and leg swelling.   Gastrointestinal:  Negative for abdominal distention, abdominal pain, diarrhea, nausea and vomiting.   Genitourinary:  Negative for dysuria and hematuria.   Musculoskeletal:  Positive for gait problem. Negative for myalgias, neck pain and neck stiffness.   Skin:  Negative for color change and rash.   Neurological:  Positive for weakness, numbness and headaches. Negative for seizures, syncope and light-headedness.   Psychiatric/Behavioral:  Negative for confusion and decreased concentration.      Objective:     Vital Signs (Most Recent):  Temp: 98.7 °F (37.1 °C) (11/04/23 2215)  Pulse: (!) 119 (11/04/23 2215)  Resp: 16 (11/04/23 2215)  BP: 123/83 (11/04/23 2215)  SpO2: 97 % (11/04/23 2215) Vital Signs (24h Range):  Temp:  [98.3 °F (36.8 °C)-98.8 °F (37.1 °C)] 98.7 °F (37.1 °C)  Pulse:  [] 119  Resp:  [14-20] 16  SpO2:  [97 %-100 %] 97 %  BP: (123-145)/(72-88) 123/83     Weight: 94 kg (207 lb 3.7 oz)  Body mass index is 25.23 kg/m².     Physical Exam  Constitutional:       General: He is not in acute distress.     Appearance: He is not toxic-appearing or diaphoretic.   HENT:      Head: Normocephalic.      Comments: Hematoma from fall     Nose: Nose normal.   Eyes:      General: Scleral icterus present.      Extraocular Movements: Extraocular movements intact.      Pupils: Pupils are equal, round, and reactive to light.   Cardiovascular:      Rate and Rhythm: Regular rhythm. Tachycardia  present.   Pulmonary:      Effort: Pulmonary effort is normal. No respiratory distress.      Breath sounds: No wheezing or rales.   Abdominal:      General: Abdomen is flat. There is no distension.      Palpations: Abdomen is soft.      Tenderness: There is no abdominal tenderness. There is no guarding.      Comments: Significant hepatomegaly   Musculoskeletal:         General: Normal range of motion.      Cervical back: Normal range of motion and neck supple. No rigidity.      Right lower leg: No edema.      Left lower leg: No edema.   Skin:     General: Skin is warm and dry.      Coloration: Skin is not jaundiced.   Neurological:      General: No focal deficit present.      Mental Status: He is alert and oriented to person, place, and time.      Cranial Nerves: No cranial nerve deficit.   Psychiatric:         Mood and Affect: Mood normal.         Behavior: Behavior normal.              CRANIAL NERVES     CN III, IV, VI   Pupils are equal, round, and reactive to light.       Significant Labs: All pertinent labs within the past 24 hours have been reviewed.  CBC:   Recent Labs   Lab 11/04/23  1357 11/04/23  2224   WBC 2.51*  --    HGB 8.7*  --    HCT 25.5* 25*   PLT 61*  --      CMP:   Recent Labs   Lab 11/04/23  1357 11/04/23  2128    136   K 2.8* 2.6*    93*   CO2 17* 20*   GLU 89 135*   BUN 7 7   CREATININE 0.4* 0.5   CALCIUM 6.5* 7.5*   PROT 4.5* 4.8*   ALBUMIN 2.1* 2.5*   BILITOT 6.5* 8.7*   ALKPHOS 254* 296*   * 622*   * 109*   ANIONGAP 21* 23*     Urine Studies:   Recent Labs   Lab 11/04/23  1719   COLORU Stcaia   APPEARANCEUA Clear   PHUR 6.0   SPECGRAV 1.010   PROTEINUA 1+*   GLUCUA Negative   KETONESU 1+*   BILIRUBINUA 2+*   OCCULTUA 2+*   NITRITE Negative   LEUKOCYTESUR Negative   RBCUA 8*   WBCUA 2   BACTERIA Rare   SQUAMEPITHEL 0   HYALINECASTS 3*       Significant Imaging: I have reviewed all pertinent imaging results/findings within the past 24 hours.

## 2023-11-05 NOTE — ASSESSMENT & PLAN NOTE
- valium 10mg po TID scheduled  - CIWA q4h  - ativan prn CIWA  - sp banana bag x1  - daily thiamine/FA/MV  - seizure precautions  - fall precautions

## 2023-11-05 NOTE — ASSESSMENT & PLAN NOTE
- patient with severe nutrional deficits and acute alcoholic hepatitis / liver failure in setting of severe alcohol use/dependence and inadequate po intake with 100lb weight loss. Pancytopenia and multiple electrolyte derangements.  - repleting K+, Mg, Ca, (phos pending)  - CMP q8h and Mg Phos Q12h for now  - follow up US liver with doppler  - continue aggressive IVFs  - alcohol withdrawal management as below  - sp banana bag x1  - daily FA/MV/thiamine  - fall precautions  - seizure precautions  - monitor tele  - Hepatology consulted ; appreciate recs  - Nutrition consulted ; appreciate recs  - Jenelle's Discriminant Function for Alcoholic Hepatitis score 15. Will defer glucocorticoid therapy at this time  - further management pending clinical course and future study review

## 2023-11-05 NOTE — PLAN OF CARE
Recommendations     1. Continue low Na diet. Add DM modifier as needed.      2. Continue Boost Glucose Control vanilla TID.      3. Continue thiamine supplementation, MVI, and folic acid as needed.     Goals: Meet % PO intake by RD f/u.  Nutrition Goal Status: new  Communication of RD Recs: other (comment) (POC)

## 2023-11-05 NOTE — HPI
49 Y/O diabetic, cigarette smoker, and daily ETOH uses/abuser M presents via EMS reporting generalized weakness, fall with closed head injury yesterday and difficulty ambulating secondary to gait instability.  No reported chest/back/abdominal pain.  He reports several months of worsening sensory loss to bilateral feet. Also reports increased alcohol use over last three months as well with associated decreased appetite po intake and as much as 100lb weight loss in the last three months. Reports drinking 1L of hard liqour daily. Triage reported questionable seizure activity, however he denies any falls or new injuries as use been on the sofa since he returned from hospital yesterday given his feeling of generalized lightheadedness and generalized weakness.  No headache, visual changes, ipsilateral numbness or weakness, vomiting/nausea or reported recent illness.    In the ED patient afebrile and hemodynamically stable saturating well on room air at rest. Patient alert and oriented and answering questions. Serum alcohol 205 on presentation though despite patient appears to be in early withdrawals. LFTs elevated AST>>>ALT. TB 6.5. INR 1.3. Alb 2.1, Ca 6.5, Mg 1.4. LA initially 6.8 but trended down to 4.6 after IVF. Initial imaging studies without acute process. Abdomen imaging pending. Patient started on aggressive IVFs and admitted to the care of medicine for further evaluation and management.

## 2023-11-05 NOTE — ASSESSMENT & PLAN NOTE
- suspect secondary to prolonged severe nutritional deficiency though other etiologies still considered  - follow up iron/TIBC/ferritin/B6/B12/Folate  - continue to monitor

## 2023-11-05 NOTE — ED NOTES
Assumed pt care at this time. The patient is awake, alert and cooperative with a calm affect, patient is aware of environment. Airway is open and patent, respirations are spontaneous, normal respiratory effort and rate noted. Skin warm and dry- pt has bruising, swelling and stitches to R eye from fall yesterday, moves all extremities well- pt endorses generalized weakness and soreness from fall yesterday. No apparent distress noted. Pt on continuous cardiac monitoring. Pt states no needs at this time. Bed locked and in lowest position with side rails up, call light within reach.

## 2023-11-05 NOTE — NURSING
Nurses Note -- 4 Eyes      11/5/2023   1:42 AM      Skin assessed during: Admit      [] No Altered Skin Integrity Present    []Prevention Measures Documented      [x] Yes- Altered Skin Integrity Present or Discovered   [x] LDA Added if Not in Epic (Describe Wound)   [x] New Altered Skin Integrity was Present on Admit and Documented in LDA   [x] Wound Image Taken    Wound Care Consulted? Yes    Attending Nurse:  Pieter Rocha RN/Staff Member:   Zane

## 2023-11-05 NOTE — ED NOTES
Telemetry Verification   Patient placed on Telemetry Box  Verified on ED monitor  Box [id number]   Monitor Tech Justo    Rate 103   Rhythm SINUS TACHY

## 2023-11-06 LAB
ALBUMIN SERPL BCP-MCNC: 2.4 G/DL (ref 3.5–5.2)
ALP SERPL-CCNC: 303 U/L (ref 55–135)
ALT SERPL W/O P-5'-P-CCNC: 77 U/L (ref 10–44)
AMMONIA PLAS-SCNC: 75 UMOL/L (ref 10–50)
ANION GAP SERPL CALC-SCNC: 11 MMOL/L (ref 8–16)
ANISOCYTOSIS BLD QL SMEAR: SLIGHT
AST SERPL-CCNC: 286 U/L (ref 10–40)
BASOPHILS # BLD AUTO: 0.02 K/UL (ref 0–0.2)
BASOPHILS NFR BLD: 0.9 % (ref 0–1.9)
BILIRUB SERPL-MCNC: 11.3 MG/DL (ref 0.1–1)
BUN SERPL-MCNC: 7 MG/DL (ref 6–20)
CALCIUM SERPL-MCNC: 7.8 MG/DL (ref 8.7–10.5)
CHLORIDE SERPL-SCNC: 96 MMOL/L (ref 95–110)
CO2 SERPL-SCNC: 27 MMOL/L (ref 23–29)
CREAT SERPL-MCNC: 0.5 MG/DL (ref 0.5–1.4)
DIFFERENTIAL METHOD: ABNORMAL
EOSINOPHIL # BLD AUTO: 0 K/UL (ref 0–0.5)
EOSINOPHIL NFR BLD: 1.8 % (ref 0–8)
ERYTHROCYTE [DISTWIDTH] IN BLOOD BY AUTOMATED COUNT: 14.5 % (ref 11.5–14.5)
EST. GFR  (NO RACE VARIABLE): >60 ML/MIN/1.73 M^2
GLUCOSE SERPL-MCNC: 141 MG/DL (ref 70–110)
HCT VFR BLD AUTO: 22.5 % (ref 40–54)
HGB BLD-MCNC: 7.5 G/DL (ref 14–18)
IMM GRANULOCYTES # BLD AUTO: 0.03 K/UL (ref 0–0.04)
IMM GRANULOCYTES NFR BLD AUTO: 1.4 % (ref 0–0.5)
INR PPP: 1.4 (ref 0.8–1.2)
LYMPHOCYTES # BLD AUTO: 0.8 K/UL (ref 1–4.8)
LYMPHOCYTES NFR BLD: 36.7 % (ref 18–48)
MAGNESIUM SERPL-MCNC: 1.3 MG/DL (ref 1.6–2.6)
MCH RBC QN AUTO: 33.8 PG (ref 27–31)
MCHC RBC AUTO-ENTMCNC: 33.3 G/DL (ref 32–36)
MCV RBC AUTO: 101 FL (ref 82–98)
MONOCYTES # BLD AUTO: 0.1 K/UL (ref 0.3–1)
MONOCYTES NFR BLD: 6.3 % (ref 4–15)
NEUTROPHILS # BLD AUTO: 1.2 K/UL (ref 1.8–7.7)
NEUTROPHILS NFR BLD: 52.9 % (ref 38–73)
NRBC BLD-RTO: 1 /100 WBC
PHOSPHATE SERPL-MCNC: 1.5 MG/DL (ref 2.7–4.5)
PLATELET # BLD AUTO: 67 K/UL (ref 150–450)
PLATELET BLD QL SMEAR: ABNORMAL
PMV BLD AUTO: 10.7 FL (ref 9.2–12.9)
POCT GLUCOSE: 152 MG/DL (ref 70–110)
POCT GLUCOSE: 222 MG/DL (ref 70–110)
POIKILOCYTOSIS BLD QL SMEAR: ABNORMAL
POTASSIUM SERPL-SCNC: 2.5 MMOL/L (ref 3.5–5.1)
PROT SERPL-MCNC: 4.9 G/DL (ref 6–8.4)
PROTHROMBIN TIME: 14.3 SEC (ref 9–12.5)
RBC # BLD AUTO: 2.22 M/UL (ref 4.6–6.2)
SODIUM SERPL-SCNC: 134 MMOL/L (ref 136–145)
STOMATOCYTES BLD QL SMEAR: PRESENT
WBC # BLD AUTO: 2.21 K/UL (ref 3.9–12.7)

## 2023-11-06 PROCEDURE — 94761 N-INVAS EAR/PLS OXIMETRY MLT: CPT

## 2023-11-06 PROCEDURE — 94660 CPAP INITIATION&MGMT: CPT

## 2023-11-06 PROCEDURE — 99233 PR SUBSEQUENT HOSPITAL CARE,LEVL III: ICD-10-PCS | Mod: ,,, | Performed by: HOSPITALIST

## 2023-11-06 PROCEDURE — 99222 PR INITIAL HOSPITAL CARE,LEVL II: ICD-10-PCS | Mod: ,,, | Performed by: INTERNAL MEDICINE

## 2023-11-06 PROCEDURE — 99233 SBSQ HOSP IP/OBS HIGH 50: CPT | Mod: ,,, | Performed by: HOSPITALIST

## 2023-11-06 PROCEDURE — 84100 ASSAY OF PHOSPHORUS: CPT | Performed by: FAMILY MEDICINE

## 2023-11-06 PROCEDURE — 80053 COMPREHEN METABOLIC PANEL: CPT | Performed by: INTERNAL MEDICINE

## 2023-11-06 PROCEDURE — S4991 NICOTINE PATCH NONLEGEND: HCPCS | Performed by: STUDENT IN AN ORGANIZED HEALTH CARE EDUCATION/TRAINING PROGRAM

## 2023-11-06 PROCEDURE — 97530 THERAPEUTIC ACTIVITIES: CPT

## 2023-11-06 PROCEDURE — 36415 COLL VENOUS BLD VENIPUNCTURE: CPT | Performed by: HOSPITALIST

## 2023-11-06 PROCEDURE — 85610 PROTHROMBIN TIME: CPT | Performed by: FAMILY MEDICINE

## 2023-11-06 PROCEDURE — 99222 PR INITIAL HOSPITAL CARE,LEVL II: ICD-10-PCS | Mod: ,,, | Performed by: PSYCHIATRY & NEUROLOGY

## 2023-11-06 PROCEDURE — 85025 COMPLETE CBC W/AUTO DIFF WBC: CPT | Performed by: FAMILY MEDICINE

## 2023-11-06 PROCEDURE — 63600175 PHARM REV CODE 636 W HCPCS: Performed by: HOSPITALIST

## 2023-11-06 PROCEDURE — 25000003 PHARM REV CODE 250: Performed by: STUDENT IN AN ORGANIZED HEALTH CARE EDUCATION/TRAINING PROGRAM

## 2023-11-06 PROCEDURE — 20600001 HC STEP DOWN PRIVATE ROOM

## 2023-11-06 PROCEDURE — 83735 ASSAY OF MAGNESIUM: CPT | Performed by: FAMILY MEDICINE

## 2023-11-06 PROCEDURE — 99222 1ST HOSP IP/OBS MODERATE 55: CPT | Mod: ,,, | Performed by: PSYCHIATRY & NEUROLOGY

## 2023-11-06 PROCEDURE — 25000003 PHARM REV CODE 250: Performed by: FAMILY MEDICINE

## 2023-11-06 PROCEDURE — 25000003 PHARM REV CODE 250: Performed by: HOSPITALIST

## 2023-11-06 PROCEDURE — 99222 1ST HOSP IP/OBS MODERATE 55: CPT | Mod: ,,, | Performed by: INTERNAL MEDICINE

## 2023-11-06 PROCEDURE — 36415 COLL VENOUS BLD VENIPUNCTURE: CPT | Performed by: FAMILY MEDICINE

## 2023-11-06 PROCEDURE — 82140 ASSAY OF AMMONIA: CPT | Performed by: HOSPITALIST

## 2023-11-06 PROCEDURE — 97165 OT EVAL LOW COMPLEX 30 MIN: CPT

## 2023-11-06 RX ORDER — SODIUM,POTASSIUM PHOSPHATES 280-250MG
2 POWDER IN PACKET (EA) ORAL
Status: DISPENSED | OUTPATIENT
Start: 2023-11-06 | End: 2023-11-07

## 2023-11-06 RX ORDER — POTASSIUM CHLORIDE 7.45 MG/ML
10 INJECTION INTRAVENOUS
Status: COMPLETED | OUTPATIENT
Start: 2023-11-06 | End: 2023-11-06

## 2023-11-06 RX ORDER — POTASSIUM CHLORIDE 20 MEQ/1
40 TABLET, EXTENDED RELEASE ORAL EVERY 4 HOURS
Status: COMPLETED | OUTPATIENT
Start: 2023-11-06 | End: 2023-11-06

## 2023-11-06 RX ORDER — LACTULOSE 10 G/15ML
30 SOLUTION ORAL 3 TIMES DAILY
Status: DISCONTINUED | OUTPATIENT
Start: 2023-11-06 | End: 2023-11-10 | Stop reason: HOSPADM

## 2023-11-06 RX ORDER — DIAZEPAM 5 MG/1
5 TABLET ORAL EVERY 12 HOURS
Status: DISCONTINUED | OUTPATIENT
Start: 2023-11-06 | End: 2023-11-07

## 2023-11-06 RX ORDER — MAGNESIUM SULFATE HEPTAHYDRATE 40 MG/ML
2 INJECTION, SOLUTION INTRAVENOUS
Status: COMPLETED | OUTPATIENT
Start: 2023-11-06 | End: 2023-11-06

## 2023-11-06 RX ADMIN — CIPROFLOXACIN HYDROCHLORIDE: 3 OINTMENT OPHTHALMIC at 09:11

## 2023-11-06 RX ADMIN — MAGNESIUM SULFATE HEPTAHYDRATE 2 G: 40 INJECTION, SOLUTION INTRAVENOUS at 11:11

## 2023-11-06 RX ADMIN — RIFAXIMIN 550 MG: 550 TABLET ORAL at 08:11

## 2023-11-06 RX ADMIN — Medication 1 PATCH: at 09:11

## 2023-11-06 RX ADMIN — THIAMINE HYDROCHLORIDE 500 MG: 100 INJECTION, SOLUTION INTRAMUSCULAR; INTRAVENOUS at 08:11

## 2023-11-06 RX ADMIN — MAGNESIUM SULFATE HEPTAHYDRATE 2 G: 40 INJECTION, SOLUTION INTRAVENOUS at 01:11

## 2023-11-06 RX ADMIN — RIFAXIMIN 550 MG: 550 TABLET ORAL at 09:11

## 2023-11-06 RX ADMIN — POTASSIUM CHLORIDE 10 MEQ: 7.46 INJECTION, SOLUTION INTRAVENOUS at 11:11

## 2023-11-06 RX ADMIN — POTASSIUM CHLORIDE 10 MEQ: 7.46 INJECTION, SOLUTION INTRAVENOUS at 12:11

## 2023-11-06 RX ADMIN — LACTULOSE 30 G: 20 SOLUTION ORAL at 03:11

## 2023-11-06 RX ADMIN — CIPROFLOXACIN HYDROCHLORIDE: 3 OINTMENT OPHTHALMIC at 03:11

## 2023-11-06 RX ADMIN — FOLIC ACID 1 MG: 1 TABLET ORAL at 09:11

## 2023-11-06 RX ADMIN — POTASSIUM CHLORIDE 10 MEQ: 7.46 INJECTION, SOLUTION INTRAVENOUS at 01:11

## 2023-11-06 RX ADMIN — DIAZEPAM 5 MG: 5 TABLET ORAL at 08:11

## 2023-11-06 RX ADMIN — THIAMINE HYDROCHLORIDE 500 MG: 100 INJECTION, SOLUTION INTRAMUSCULAR; INTRAVENOUS at 03:11

## 2023-11-06 RX ADMIN — DIAZEPAM 5 MG: 5 TABLET ORAL at 05:11

## 2023-11-06 RX ADMIN — CIPROFLOXACIN HYDROCHLORIDE: 3 OINTMENT OPHTHALMIC at 08:11

## 2023-11-06 RX ADMIN — POTASSIUM CHLORIDE 40 MEQ: 1500 TABLET, EXTENDED RELEASE ORAL at 01:11

## 2023-11-06 RX ADMIN — POTASSIUM CHLORIDE 40 MEQ: 1500 TABLET, EXTENDED RELEASE ORAL at 11:11

## 2023-11-06 RX ADMIN — POTASSIUM & SODIUM PHOSPHATES POWDER PACK 280-160-250 MG 2 PACKET: 280-160-250 PACK at 08:11

## 2023-11-06 RX ADMIN — LACTULOSE 30 G: 20 SOLUTION ORAL at 08:11

## 2023-11-06 RX ADMIN — THERA TABS 1 TABLET: TAB at 09:11

## 2023-11-06 RX ADMIN — POTASSIUM & SODIUM PHOSPHATES POWDER PACK 280-160-250 MG 2 PACKET: 280-160-250 PACK at 11:11

## 2023-11-06 RX ADMIN — THIAMINE HCL TAB 100 MG 100 MG: 100 TAB at 09:11

## 2023-11-06 NOTE — PT/OT/SLP EVAL
Occupational Therapy   Evaluation/tx    Name: Alberto Song  MRN: [id number]  Admitting Diagnosis: Alcoholic liver disease  Recent Surgery: * No surgery found *      Recommendations:     Discharge Recommendations: High Intensity Therapy  Discharge Equipment Recommendations:  walker, rolling  Barriers to discharge:  Decreased caregiver support    Assessment:     Alberto Song is a 48 y.o. male with a medical diagnosis of Alcoholic liver disease.  He presents with deficits in self-care skills, balance, and mobility. Pt. Participated well in session on this date. Pt. Is a high fall risk at this time. Pt. Has several steps into house and within home that will be difficult to manage at this time at his current level of functioning.  Patient would benefit from continued OT services to maximize level of safety and independence with self-care tasks.   . Performance deficits affecting function: weakness, impaired endurance, impaired self care skills, impaired functional mobility, gait instability, impaired balance, impaired skin.      Rehab Prognosis: Good; patient would benefit from acute skilled OT services to address these deficits and reach maximum level of function.       Plan:     Patient to be seen 4 x/week to address the above listed problems via self-care/home management, therapeutic activities, therapeutic exercises, neuromuscular re-education  Plan of Care Expires: 12/06/23  Plan of Care Reviewed with: patient, father    Subjective     Chief Complaint: pt. Reported IV was bothering him and feeling weak overall  Patient/Family Comments/goals: to get better    Occupational Profile:  Living Environment: Pt. Has a roommate. He lives in a 3 story house with ~ 20 steps to enter 1st floor and LHR . Bed and bath are on 1st floor. Pt.'s kitchen is ion second floor which is an additional flight of stairs with LHR. Pt. Reported there is a seat in the shower  Previous level of function: Pt. Reports I with ADL tasks and  mobility. Pt. Drives occasionally  Roles and Routines: caretaker of self (roommate was not assisting pt)  Equipment Used at Home: CPAP, shower chair  Assistance upon Discharge: no real assist    Pain/Comfort:  Pain Rating 1:  (di dnot rate)  Location - Side 1: Right  Location 1: arm (IV site (nurse notified))  Pain Addressed 1: Reposition, Distraction  Pain Rating Post-Intervention 1:  (did not rate)    Patients cultural, spiritual, Restoration conflicts given the current situation: no    Objective:     Communicated with: nurse prior to session.  Patient found supine with winston catheter, peripheral IV upon OT entry to room.    General Precautions: Standard, fall, diabetic, seizure  Orthopedic Precautions: N/A  Braces: N/A  Respiratory Status: Room air    Occupational Performance:    Bed Mobility:    Patient completed Supine to Sit with minimum assistance at trunk    Functional Mobility/Transfers:  Patient completed Sit <> Stand Transfer with minimum assistance  with  no assistive device   Patient completed Bed <> Chair Transfer using Stand Pivot technique with minimum assistance with hand-held assist  Functional Mobility: Pt. Ambulated around bed to bedside chair with Min A /HHA    Activities of Daily Living:  Lower Body Dressing: minimum assistance to don slip on shoes on completely    Cognitive/Visual Perceptual:  Cognitive/Psychosocial Skills:     -       Oriented to: Person, Place, Time, and Situation   -       Follows Commands/attention:Follows multistep  commands  -       Communication: clear/fluent  -       Memory: No Deficits noted  -       Safety awareness/insight to disability: intact   -       Mood/Affect/Coping skills/emotional control: Appropriate to situation  Visual/Perceptual:      -wears readers    Physical Exam:  Balance: -       sit: good; stand: Min A  Postural examination/scapula alignment:    -       Rounded shoulders  -       Posterior pelvic tilt  Skin integrity: Bruising of right eye/some  scabs on hands  Dominant hand:  right  Upper Extremity Range of Motion:     -       Right Upper Extremity: Deficits: at shoulder marina than 90 degrees actively; AAROM WFL; elbows and below WFL  -       Left Upper Extremity: WFL   Strength:    -       Right Upper Extremity: WFL  -       Left Upper Extremity: WFL    AMPAC 6 Click ADL:  AMPA Total Score: 19    Treatment & Education:  Pt. And family educated on role of OT and POC  Pt. Educated on importance of OOB  Nurse notified pt. OOB and telesitter as well as pt. Father in room    Patient left up in chair with all lines intact, call button in reach, and wound care present    GOALS:   Multidisciplinary Problems       Occupational Therapy Goals          Problem: Occupational Therapy    Goal Priority Disciplines Outcome Interventions   Occupational Therapy Goal     OT, PT/OT Ongoing, Progressing    Description: Goals to be met by: 11-16-23     Patient will increase functional independence with ADLs by performing:    UE Dressing with Supervision.  LE Dressing with Supervision.  Grooming while standing at sink with Supervision.  Toileting from toilet with Supervision for hygiene and clothing management.   Supine to sit with [place].  Stand pivot transfers with Supervision.  Toilet transfer to toilet with Supervision.  Pt. To be I with HEP to BUE to improve level of endurance                         History:     Past Medical History:   Diagnosis Date    Asthma     Diabetes mellitus     Hyperlipidemia     Hypertension     Sleep apnea, unspecified     Type 2 diabetes mellitus without complication, without long-term current use of insulin 6/6/2023         Past Surgical History:   Procedure Laterality Date    LAPAROSCOPIC CHOLECYSTECTOMY N/A 2/8/2023    Procedure: CHOLECYSTECTOMY, LAPAROSCOPIC;  Surgeon: Nilo Tobias MD;  Location: [facility];  Service: General;  Laterality: N/A;       Time Tracking:     OT Date of Treatment: 11/06/23  OT Start Time: 1449  OT  Stop Time: 1510  OT Total Time (min): 21 min    Billable Minutes:Evaluation 11  Therapeutic Activity 10    11/6/2023

## 2023-11-06 NOTE — SUBJECTIVE & OBJECTIVE
Interval History:   11/6: pateint sleepy - will decrease valium and check fro HE    Review of Systems   Constitutional:  Positive for activity change, appetite change, fatigue and unexpected weight change. Negative for chills, diaphoresis and fever.   HENT:  Negative for sore throat and trouble swallowing.    Eyes:  Negative for photophobia and visual disturbance.   Respiratory:  Negative for cough, shortness of breath and wheezing.    Cardiovascular:  Negative for chest pain, palpitations and leg swelling.   Gastrointestinal:  Negative for abdominal distention, abdominal pain, diarrhea, nausea and vomiting.   Genitourinary:  Negative for dysuria and hematuria.   Musculoskeletal:  Positive for gait problem. Negative for myalgias, neck pain and neck stiffness.   Skin:  Negative for color change and rash.   Neurological:  Positive for weakness, numbness and headaches. Negative for seizures, syncope and light-headedness.   Psychiatric/Behavioral:  Negative for confusion and decreased concentration.      Objective:     Vital Signs (Most Recent):  Temp: 97.5 °F (36.4 °C) (11/06/23 0753)  Pulse: 91 (11/06/23 0753)  Resp: 18 (11/06/23 0753)  BP: 124/85 (11/06/23 0753)  SpO2: 98 % (11/06/23 0753) Vital Signs (24h Range):  Temp:  [97.5 °F (36.4 °C)-98.8 °F (37.1 °C)] 97.5 °F (36.4 °C)  Pulse:  [] 91  Resp:  [16-18] 18  SpO2:  [97 %-99 %] 98 %  BP: (124-174)/(85-98) 124/85     Weight: 90.8 kg (200 lb 3.2 oz)  Body mass index is 24.37 kg/m².    Intake/Output Summary (Last 24 hours) at 11/6/2023 1111  Last data filed at 11/6/2023 0708  Gross per 24 hour   Intake --   Output 1100 ml   Net -1100 ml         Physical Exam  Constitutional:       General: He is not in acute distress.     Appearance: He is not toxic-appearing or diaphoretic.   HENT:      Head: Normocephalic.      Comments: Hematoma from fall     Nose: Nose normal.   Eyes:      General: Scleral icterus present.      Extraocular Movements: Extraocular movements  intact.      Pupils: Pupils are equal, round, and reactive to light.   Cardiovascular:      Rate and Rhythm: Regular rhythm. Tachycardia present.   Pulmonary:      Effort: Pulmonary effort is normal. No respiratory distress.      Breath sounds: No wheezing or rales.   Abdominal:      General: Abdomen is flat. There is no distension.      Palpations: Abdomen is soft.      Tenderness: There is no abdominal tenderness. There is no guarding.      Comments: Significant hepatomegaly   Musculoskeletal:         General: Normal range of motion.      Cervical back: Normal range of motion and neck supple. No rigidity.      Right lower leg: No edema.      Left lower leg: No edema.   Skin:     General: Skin is warm and dry.      Coloration: Skin is not jaundiced.   Neurological:      General: No focal deficit present.      Mental Status: He is alert and oriented to person, place, and time.      Cranial Nerves: No cranial nerve deficit.   Psychiatric:         Mood and Affect: Mood normal.         Behavior: Behavior normal.             Significant Labs: All pertinent labs within the past 24 hours have been reviewed.    Significant Imaging: I have reviewed all pertinent imaging results/findings within the past 24 hours.

## 2023-11-06 NOTE — PLAN OF CARE
Problem: Adult Inpatient Plan of Care  Goal: Plan of Care Review  Outcome: Ongoing, Progressing  Goal: Patient-Specific Goal (Individualized)  Outcome: Ongoing, Progressing  Goal: Absence of Hospital-Acquired Illness or Injury  Outcome: Ongoing, Progressing  Goal: Optimal Comfort and Wellbeing  Outcome: Ongoing, Progressing  Goal: Readiness for Transition of Care  Outcome: Ongoing, Progressing     Problem: Skin Injury Risk Increased  Goal: Skin Health and Integrity  Outcome: Ongoing, Progressing     Problem: Diabetes Comorbidity  Goal: Blood Glucose Level Within Targeted Range  Outcome: Ongoing, Progressing     Problem: Impaired Wound Healing  Goal: Optimal Wound Healing  Outcome: Ongoing, Progressing     Patient resting quietly in bed using his cell phone, bed in low position, call light in reach, rails up x 3, no c/o pain, no acute distress noted.

## 2023-11-06 NOTE — DISCHARGE INSTRUCTIONS
REFERRAL RECOMMENDATIONS FOR ALCOHOL USE DISORDER      12 STEP PROGRAMS (and similar):     Alcoholics Anonymous (local)  [x] [phone]  [x] www.[place].org for schedules for in-person and online meetings  [x] There are AA meetings throughout the day all over town  [x] AA costs nothing to attend; they pass a basket for donations but this is not required    Alcoholics Anonymous Online Intergroup (national)  [x] www.aa-intergroup.org  [x] Good resource for large, nation-wide meetings  [x] Can also attend smaller, local meetings in other cities  [x] Countless meetings all day and all night  [x] AA costs nothing to attend; they pass a basket for donations but this is not required    Flying Sober - 24/7 zoom meetings for women and coed - sign on anytime, anywhere!  [email or web address]    Online Intergroup of AA - 121 Open AA Delta Meeting - 24/7 zoom meetings  https://aa-intergroup.org/meetings/    LOOKING FOR AN ALTERNATIVE TO 12 STEP PROGRAMS - check out:  SMART Recovery: https://www.smartrecovery.org/about-us  [facility] Recovery: https://recoverydharma.org      DETOX UNITS (USUALLY 5-7 DAYS):     [facility]: [address], [place]  [phone], call first to ensure bed availability    [facility]: [address] [place]  [phone], Option 1, call first to ensure bed availability    [place] Detox and Recovery Center: [facility] [address], [place]  [phone] (intake by appointment only)    [facility]: [address], [place]  [phone]      INTENSIVE OUTPATIENT PROGRAMS:     [facility] REC[facility]ERY PROGRAM (formerly known as the ABU)  [x] [phone], Option 2  [x] [address] 4th Floor, [place] [address]  [x] https://www.ochsner.org/services/[facility]-recovery-program  [x] [facility] delivers comprehensive and collaborative treatment for alcohol and substance use disorders.  Excellent program for working professionals or  anyone else seeking recovery.  [x] Requires insurance approval prior to starting program, call number above for more information.  [x] Intensive Outpatient Rehabilitation Program - M-F 9am-3pm - daily groups with psychologists and social workers, sessions with MDs 3x per week   [x] Ambulatory detox and dual diagnosis available    [facility] Intensive Outpatient Program  [x] [phone]  [x] [address] [facility] (the clinic not on [facility] main [facility])  [x] Call number above for more info and to check insurance requirements    [facility]  [address] [place], [place] [id number] [phone]    [facility]:  [address] [facility] [address], [address] [place], [place] [address]?, [phone]  [address]?, [place], [place] [address]?, [phone]    RESIDENTIAL REHABS (USUALLY 28 DAYS):     [facility]: [address], [phone]    [facility] Recovery Center: [address] , [facility]  [phone] (intake by appointment only)    [facility] (men only) [address] [facility], [phone]    [facility] (Female only) [address] [facility], [phone]    [facility]: [address], [facility], men's program [phone], women's program [phone]    [facility]: [address], [facility], [phone]    [facility]: [address] [place], [place], [phone]    [facility]: Men only, [phone], [address] [place] [facility]: [address], [place], [place], [phone]    [facility]: [facility] [place], [place],  [phone]  New Location: [address] [place] [address] [address], [place] [address], [phone]    [facility]:   ?[address], [place] [address]?[phone]    [place]: [address], [place] [address], [phone]    [place]: [place], [phone]     [facility]: [place], [place], [phone]    [address] [facility]: [address]  LANETTE [place], [phone]    [facility]: [place], [place], [phone]    [place]: [place], [place], [phone]    Leslee [place]: [address], [place] [address], [phone]    COMMUNITY ADDICTION CLINICS:     [facility]: [address] [facility] [address], [address] [place], [phone] -or- [address] [place] [address]    [facility] [place]: [address], [place]  [address]     [facility]: Clinics [phone]; Crisis [phone]    [facility]: [address] [facility] [address]    [facility]: [address] [place] [address]    [facility]: [address] [place] [address],    [facility]: 2nd Floor [address] [place] [address]    [facility]: [address] [facility]ney[facility] [place] [address]    [facility], [address] [place] [address]    [facility] Behavior[facility] Health Center: [address] [place] [address], [place] [phone]  (serves youth 16-23 years old)    [facility]: [facility]/[place] [phone]    [facility]: [address] [facility], [place] [phone]    [facility]: [address], [place] [phone]    [facility]: [address] [facility], [address], [phone]     [facility]: [address] [facility], [phone], [phone]    RESOURCES IN OTHER [facility]:     [facility]: [address] [place] Garcia, [phone], [phone]    [facility]: [address], [address], [phone]    [facility], [address] [place] [address], [phone]    [facility]: [address], [place], [phone]    [facility]  "[facility] Health, [address] [facility], [phone] ([facility])    [facility], [address] [facility], [phone] ([facility])    [facility], [address], [address], [place], [phone] ([place], [place], and [facility])    [facility], [address], [place], [phone] ([facility])    [facility] Hotline [phone], [phone]    [facility], [address] [facility], [facility], [address] [facility], [address], [place] [facility], [address] [facility] [facility], [facility]al [facility], [address] [facility] [address]., [place] [facility], [address], [facility], [address], [address] [phone]    [facility], [address] [facility] [phone]    [facility], [address] [facility], [phone]    [facility] for Addiction Recovery, [address] [place] [address], [phone]      [place] citizen SPEAKING (en español):     Información de la reunión de Alcohólicos Anónimos  [facility]on [facility], 10:00 am  Habla español  Esta reunión está abierta y cualquiera puede asistir.    Korean speaking Alcoholics anonymous meetings:  El "Chon [place] AA Skype" es un chon on line de Alcohólicos Anónimos en español. El chon es walter, gratuito y virtual a través de Skype Audio. El chon funciona mediante jatinder llamada grupal de voz, por lo que no se utiliza la videollamada, ni se pueden tyree las imágenes o rostros de los participantes. Hace yisel años y medio abrimos el primer Chon de AA por [place] en [place], ambrose actualmente asisten personas desde [place], [place], [place], [place], [place], [place], [place], [place], [place] ann[place], [place], " [place] y [place], entre otros.    El melissa es muy útil para los alcohólicos que residen en lugares donde no se celebran reuniones de AA, o residen en lugares donde las reuniones de AA son un número limitado de días a la semana, o para aquellos compañeros que se hayan de viaje o que, por cualquier motivo, se hayan convalecientes y no pueden desplazarse. Todos los días nos reuniones a las 21:00 (hora española)    Podéis obtener más información sobre el melissa y adam sesiones en la págFormlabs web [email or web address] Online.tl/      MENTAL HEALTH/ADDICTIVE DISORDERS:     AA ([phone]), NA ([phone])   National Suicide Prevention Lifeline- Call 1-991.224.8504 Available 24 hours [facility] 208-8941; Crisis Line [phone] - Call for options A-F:  Intensive Outpatient Treatment/ Day programs   [facility], please contact   [facility], please contact [phone] or [phone] to speak with an admissions counselor.  Behavioral Health Group (Methadone Maintenance)   [address] [place] [address], [phone]  [facility] [place] [address] [phone]  [facility], [address] [address], [phone]  [facility] [phone]  [facility] [place] please contact [phone]  [facility], [facility], 4th floor [place]TE Mendoza [address] Phone: [phone]   [place]cer  [place] Office: [address] [address], [phone]  [place] Office: [address] [facility] [address], [place]endo[place] [address], [phone]  [place] Office: [address] [facility] [place] [address] [phone]    Outpatient Substance Abuse Treatment   Behavioral Health Group (Methadone Maintenance)   [facility] [place] [address], [phone]  [address] [place] [address] [phone]  [facility], [address] [place] [address] [address], [phone]  [place]cer  [place] Office: [address] [place] [address], [phone]  [place] Office: [address] [facility], [address], [place] [address], [phone]  [place] Office: [address] [place] [address] [phone]  [facility], [address] [place] [address] [phone]   [facility], [address] [facility], [address], [phone]  [facility]over, [address] [place], [place] [phone]    Residential Substance Abuse Treatment   [facility] [address] [facility], [phone]  [facility], [address] [facility], [phone]  [facility] (men only) [address] [place] [address], [phone]  [facility] at the [facility] (women only) [address] [place] [address] [phone]  [facility], [address] [place] [address] [phone]  [facility] (women only), intakes at [address] [facility], [phone]  [facility] (7-day program, $100, [address], [phone], [phone], [phone])  [facility] (Men only, [phone]), [address], [place] (Vets*/Non-Vets)  [facility] (Men only, $400/month program fee) [address] [facility] [address]rey [address], [phone]  [facility] (Women over age 39 only), [address] [facility], [phone]    Out of Area:    [place], [address] [place] [address] 36, [place], [place] [phone])  [facility] (men only), [address] [facility] [address], [phone]  [facility], [address] [place], [place] ([phone])  [address] Duke, [place] ([phone])  [facility], [address] [place] [address], [phone]  [facility] (Women only, has to have mental illness, can be homeless or substance abuser), [phone]      [place] RESOURCES:     [place] [facility] Crisis Response  Team:    Region 12 ([place], [place], [place], and [facility]) ([facility])  [phone]      Outpatient Mental Health & Addiction Clinic Resources for both [facility]:    [facility] Mental Healthcare Resources  Website: www.[facility] Hospital[facility].org  Main Number: 379-518-9111    [facility] ([facility])  [address] ([address] [facility]) [place] [address]  [phone]    [facility])  [address] ([address] [facility]) [place] [address]  [phone]    [facility]  [address] ([address]) [place] [address]  [phone]    [facility]  [address] ([address] [facility]) [place] [address]  [phone]      Addiction Treatment Resources for both [facility]:    [facility] (Detox, Residential, PHP, IOP, and Aftercare Programs)  [address] [place], [place] [address] [phone]    [facility] (Residential, IOP, Transitional Living, and Aftercare Programs)  [address] [facility] [place] [address]  [phone]    [facility] (Inpatient, Residential, Detox, IOP, Outpatient, and Aftercare Programs)  [address] [place] [phone] or toll free at [phone]      Outpatient Mental Health Psychotherapy Resources for both [facility]:    Balbina Fontenot, BARBARAW  [address]  [place] [address]  [phone]  Specialties: Depression, Anxiety, and Life Transitions    Syeda Martinez, PhD  [address]  [place] [address]  [phone]  Specialties: Testing and Evaluation, Education and Learning Disabilities, and ADHD    No Garcia, BARBARAW [facility] [address] [facility] [place] [address]  [phone]  Specialties:  Obsessive-Compulsive (OCD), Depression, and Relationship Issues    Nola Lopez LPC [address] [place] [address]  [phone]  Specialties: Trauma & PTSD, Mood Disorders, and Anxiety    Nola Catherine, PhD, [facility] [address] [place] [address], [place] [address] [phone]  Specialties: Family Conflict, Child, and Relationship Issues    Betzaida Fleming LPC Counseling [place], [place] [address] [phone]  Specialties: Anxiety, Depression, and Anger Management        IN CASE OF SUICIDAL THINKING, call the National Suicide Hotline Number: 988    988 Suicide & Crisis Lifeline: 988 , 8-6792-055-016-TALK (6016)  Provides 24/7, free and confidential support for people in distress, prevention and crisis resources for you or your loved ones, and best practices for professionals.    Call, text or chat.  https://988Blownawayline.org

## 2023-11-06 NOTE — CONSULTS
[facility]  Hepatology  Consult Note    Patient Name: Alberto Song  MRN: [id number]  Admission Date: 11/4/2023  Hospital Length of Stay: 2 days  Code Status: Full Code   Attending Provider: Shakira Fajardo MD   Consulting Provider: Vimal Tyler MD  Primary Care Physician: Paolo Catalan MD  Principal Problem:Alcoholic liver disease    Inpatient consult to Hepatology  Consult performed by: Vimal Tyler MD  Consult ordered by: Shakira Fajardo MD        Subjective:     HPI: Alberto Song is a 48 y.o. male with history of alcohol use disorder, DM who presents for weakness and fall yesterday. Also with increased alcohol use over the past 3 months, poor PO intake and reported 100 lb weight loss. Drinks 1L liquor daily. Serum alcohol 205 at presentation and appeared to be in early withdrawal per admission note.    INR 1.3 TB 6.5 on admission, AST >>> ALT. US liver shows hepatomegaly with steatosis. No ascites or splenomegaly. Noted chronic thrombocytopenia.         Past Medical History:   Diagnosis Date    Asthma     Diabetes mellitus     Hyperlipidemia     Hypertension     Sleep apnea, unspecified     Type 2 diabetes mellitus without complication, without long-term current use of insulin 6/6/2023       Past Surgical History:   Procedure Laterality Date    LAPAROSCOPIC CHOLECYSTECTOMY N/A 2/8/2023    Procedure: CHOLECYSTECTOMY, LAPAROSCOPIC;  Surgeon: Nilo Tobias MD;  Location: [facility];  Service: General;  Laterality: N/A;       Family History   Problem Relation Age of Onset    Cancer Mother     Early death Mother     Hypertension Father        Social History     Socioeconomic History    Marital status: Single   Tobacco Use    Smoking status: Every Day     Current packs/day: 1.00     Average packs/day: 1 pack/day for 32.0 years (32.0 ttl pk-yrs)     Types: Cigarettes    Smokeless tobacco: Never   Substance and Sexual Activity    Alcohol use: Not Currently     Comment:  One 750ml per day    Drug use: Never    Sexual activity: Not Currently     Partners: Female     Birth control/protection: None   Social History Narrative    ** Merged History Encounter **          Social Determinants of Health     Financial Resource Strain: Unknown (11/6/2023)    Overall Financial Resource Strain (CARDIA)     Difficulty of Paying Living Expenses: Patient refused   Food Insecurity: Unknown (11/6/2023)    Hunger Vital Sign     Worried About Running Out of Food in the Last Year: Patient refused     Ran Out of Food in the Last Year: Patient refused   Transportation Needs: Unknown (11/6/2023)    PRAPARE - Transportation     Lack of Transportation (Medical): Patient refused     Lack of Transportation (Non-Medical): Patient refused   Physical Activity: Unknown (11/6/2023)    Exercise Vital Sign     Days of Exercise per Week: Patient refused     Minutes of Exercise per Session: Patient refused   Stress: Unknown (11/6/2023)    [employer] - Occupational Stress Questionnaire     Feeling of Stress : Patient refused   Social Connections: Unknown (11/6/2023)    Social Connection and Isolation Panel [NHANES]     Frequency of Communication with Friends and Family: Patient refused     Frequency of Social Gatherings with Friends and Family: Patient refused     Attends Quaker Services: Patient refused     Active Member of Clubs or Organizations: Patient refused     Attends Club or Organization Meetings: Patient refused     Marital Status: Patient refused   Housing Stability: Unknown (11/6/2023)    Housing Stability Vital Sign     Unable to Pay for Housing in the Last Year: Patient refused     Number of Places Lived in the Last Year: 1     Unstable Housing in the Last Year: Patient refused       No current facility-administered medications on file prior to encounter.     Current Outpatient Medications on File Prior to Encounter   Medication Sig Dispense Refill    empagliflozin  "(JARDIANCE) 25 mg tablet Take 1 tablet (25 mg total) by mouth once daily. 90 tablet 3    lisinopriL (PRINIVIL,ZESTRIL) 20 MG tablet Take 1 tablet (20 mg total) by mouth once daily. 90 tablet 1    metFORMIN (GLUCOPHAGE-XR) 500 MG ER 24hr tablet Take 2 tablets (1,000 mg total) by mouth 2 (two) times daily with meals. 360 tablet 3    rosuvastatin (CRESTOR) 10 MG tablet Take 1 tablet (10 mg total) by mouth once daily. (Patient not taking: Reported on 7/7/2023) 90 tablet 3    [DISCONTINUED] insulin syringe-needle U-100 0.5 mL 31 gauge x 5/16" Syrg Insulin infection 5 x a day 100 each 3    [DISCONTINUED] ketoconazole (NIZORAL) 2 % cream Apply topically once daily. 60 g 1    [DISCONTINUED] pen needle, diabetic 31 gauge x 1/4" Ndle 1 applicator by Misc.(Non-Drug; Combo Route) route 4 (four) times daily. 100 each 20    [DISCONTINUED] sildenafiL (VIAGRA) 50 MG tablet Take 1 tablet (50 mg total) by mouth daily as needed for Erectile Dysfunction. 15 tablet 5       Review of patient's allergies indicates:  No Known Allergies      Objective:     Vitals:    11/06/23 0753   BP: 124/85   Pulse: 91   Resp: 18   Temp: 97.5 °F (36.4 °C)       Physical Exam:  Constitutional:  not in acute distress  HENT: Head: Normal, normocephalic.  Eyes: scleral icterus. Ecchymosis around R eye  Respiratory: normal chest expansion & respiratory effort and no accessory muscle use  GI: soft, non-tender, without masses or organomegaly  Skin: jaundiced  Neurological: alert, oriented     Significant Labs:  Recent Labs   Lab 11/04/23  1357 11/05/23  0133 11/06/23  0403   HGB 8.7* 7.6* 7.5*       Lab Results   Component Value Date    WBC 2.21 (L) 11/06/2023    HGB 7.5 (L) 11/06/2023    HCT 22.5 (L) 11/06/2023     (H) 11/06/2023    PLT 67 (L) 11/06/2023       Lab Results   Component Value Date     (L) 11/06/2023    K 2.5 (LL) 11/06/2023    CL 96 11/06/2023    CO2 27 11/06/2023    BUN 7 11/06/2023    CREATININE 0.5 11/06/2023    CALCIUM 7.8 (L) " 11/06/2023    ANIONGAP 11 11/06/2023    ESTGFRAFRICA >105 09/04/2022       Lab Results   Component Value Date    ALT 77 (H) 11/06/2023     (H) 11/06/2023    GGT 66 (H) 07/06/2023    ALKPHOS 303 (H) 11/06/2023    BILITOT 11.3 (H) 11/06/2023       Lab Results   Component Value Date    INR 1.4 (H) 11/06/2023    INR 1.3 (H) 11/05/2023    INR 1.3 (H) 11/04/2023     MELD 3.0: 23 at 11/6/2023  8:19 AM  MELD-Na: 21 at 11/6/2023  8:19 AM  Calculated from:  Serum Creatinine: 0.5 mg/dL (Using min of 1 mg/dL) at 11/6/2023  8:19 AM  Serum Sodium: 134 mmol/L at 11/6/2023  8:19 AM  Total Bilirubin: 11.3 mg/dL at 11/6/2023  8:19 AM  Serum Albumin: 2.4 g/dL at 11/6/2023  8:19 AM  INR(ratio): 1.4 at 11/6/2023  4:03 AM  Age at listing (hypothetical): 48 years  Sex: Male at 11/6/2023  8:19 AM        Significant Imaging:  Reviewed pertinent radiology findings.       Assessment/Plan:     Alberto Song is a 48 y.o. male with history of alcohol use disorder who presents for generalized weakness, weight loss, recent fall. Hepatology consulted for elevated LFTs. Suspect chronic liver disease with superimposed alcoholic hepatitis based on history and pattern of LFT elevations. Likely underlying cirrhosis. Will obtain serologic workup to rule out secondary causes. MELD 23. Has potential to improve with abstinence from alcohol over next several months. Appreciate addiction psych assistance.     Problem List:  Chronic alcoholic liver disease  Alcohol use disorder    Recommendations:  - Withdrawal treatment per addiction psych  - Discussed need for abstinence with patient. Noted he declined rehab after discharge when discussing with addiction psych. Outpatient therapist was recommended.  - Will obtain serologic workup  - PETH ordered  - AFP ordered  - Agree with lactulose and rifaximin at this time  - Will need outpatient hepatology clinic follow-up  - Trend LFTs, INR daily    Thank you for involving us in the care of Alberto Song. Please  call with any additional questions, concerns or changes in the patient's clinical status. We will continue to follow.   Vimal Tyler MD  Gastroenterology & Hepatology Fellow PGY V   [facility]

## 2023-11-06 NOTE — PLAN OF CARE
Problem: Occupational Therapy  Goal: Occupational Therapy Goal  Description: Goals to be met by: 11-16-23     Patient will increase functional independence with ADLs by performing:    UE Dressing with Supervision.  LE Dressing with Supervision.  Grooming while standing at sink with Supervision.  Toileting from toilet with Supervision for hygiene and clothing management.   Supine to sit with [place].  Stand pivot transfers with Supervision.  Toilet transfer to toilet with Supervision.  Pt. To be I with HEP to BUE to improve level of endurance    Outcome: Ongoing, Progressing

## 2023-11-06 NOTE — PLAN OF CARE
[facility] - Telemetry Stepdown  Initial Discharge Assessment       Primary Care Provider: Paolo Catalan MD    Admission Diagnosis: Alcohol withdrawal [F10.939]  Hypokalemia [E87.6]  Alcohol abuse [F10.10]  Fall [W19.XXXA]  Unable to ambulate [R26.2]  Pancytopenia [D61.818]  Chest pain [R07.9]  Closed head injury, initial encounter [S09.90XA]    Admission Date: 11/4/2023  Expected Discharge Date: 11/9/2023    Transition of Care Barriers: Substance Abuse    Payor: [employer] / Plan: BCBS ALL OUT OF STATE / Product Type: PPO /     Extended Emergency Contact Information  Primary Emergency Contact: Jerry Song  Mobile Phone: [phone]  Relation: Father  Preferred language: English   needed? No    Discharge Plan A: Other (IP substance use rehab)  Discharge Plan B: Community Services, Home with family (OP Substance use programs)      [id number] - [place], [place] - [address]  [address] [facility] [address] [facility] [address]  Phone: [phone] Fax: [phone]    [id number] (INTERNAL USE ONLY) - [place], [place] - [address] [place] [address]  Phone: [phone] Fax: [phone]      Initial Assessment (most recent)       Adult Discharge Assessment - 11/06/23 1300          Discharge Assessment    Assessment Type Discharge Planning Assessment     Confirmed/corrected address, phone number and insurance Yes     Confirmed Demographics Correct on Facesheet     Source of Information family     If unable to respond/provide information was family/caregiver contacted? Yes     Contact Name/Number Jerry Song     Does patient/caregiver understand observation status Yes     Communicated HI with patient/caregiver Yes     People in Home friend(s)     Do you expect to return to your current living situation? Yes     Do you have help at home or someone to help you manage your care at  home? No     Current cognitive status: Unable to Assess   Groggy, woken from sleep    Equipment Currently Used at Home CPAP     Readmission within 30 days? No     Patient currently being followed by outpatient case management? No     Do you currently have service(s) that help you manage your care at home? No     Do you take prescription medications? Yes     Do you have prescription coverage? Yes     Coverage BCBS     Do you have any problems affording any of your prescribed medications? TBD     Is the patient taking medications as prescribed? yes     Who is going to help you get home at discharge? Jerry Song (Father) - [phone]     How do you get to doctors appointments? car, drives self     Are you on dialysis? No     Do you take coumadin? No     DME Needed Upon Discharge  none     Discharge Plan discussed with: Parent(s)     Name(s) and Number(s) Jerry Song     Transition of Care Barriers Substance Abuse     Discharge Plan A Other   IP substance use rehab    Discharge Plan B Community Services;Home with family   OP Substance use programs       Physical Activity    On average, how many days per week do you engage in moderate to strenuous exercise (like a brisk walk)? Patient refused     On average, how many minutes do you engage in exercise at this level? Patient refused        Financial Resource Strain    How hard is it for you to pay for the very basics like food, housing, medical care, and heating? Patient refused        Housing Stability    In the last 12 months, was there a time when you were not able to pay the mortgage or rent on time? Patient refused     In the last 12 months, was there a time when you did not have a steady place to sleep or slept in a shelter (including now)? Patient refused        Transportation Needs    In the past 12 months, has lack of transportation kept you from medical appointments or from getting medications? Patient refused     In the past 12 months, has lack of  transportation kept you from meetings, work, or from getting things needed for daily living? Patient refused        Food Insecurity    Within the past 12 months, you worried that your food would run out before you got the money to buy more. Patient refused     Within the past 12 months, the food you bought just didn't last and you didn't have money to get more. Patient refused        Stress    Do you feel stress - tense, restless, nervous, or anxious, or unable to sleep at night because your mind is troubled all the time - these days? Patient refused        Social Connections    In a typical week, how many times do you talk on the phone with family, friends, or neighbors? Patient refused     How often do you get together with friends or relatives? Patient refused     How often do you attend Druze or Gnosticist services? Patient refused     Do you belong to any clubs or organizations such as Druze groups, unions, fraternal or athletic groups, or school groups? Patient refused     How often do you attend meetings of the clubs or organizations you belong to? Patient refused     Are you , , , , never , or living with a partner? Patient refused        Alcohol Use    Q1: How often do you have a drink containing alcohol? Patient refused     Q2: How many drinks containing alcohol do you have on a typical day when you are drinking? Patient refused     Q3: How often do you have six or more drinks on one occasion? Patient refused        OTHER    Name(s) of People in Home Roommate (family did not know his name)                      Pt was sleepy and unable/unwilling to answer questions.  SW assessment was with family (Jerry Duncan). Pt lives with roommate, who is not involved in pt's care normally. Pt uses CPAP regularly. Pt is does not use HH, dialysis or coumadin. Pt's family will be in town for a few weeks and will be able to transport at d/c if needed.    Discharge Plan A Other    IP substance use rehab   Discharge Plan B Community Services;Home with family   OP Substance use programs       Discharge Plan A and Plan B have been determined by review of patient's clinical status, future medical and therapeutic needs, and coverage/benefits for post-acute care in coordination with multidisciplinary team members.     Gisela Addison LMSW  Case Management [facility]  [phone]

## 2023-11-06 NOTE — PROGRESS NOTES
[facility] Tele[facility] Medicine  Progress Note    Patient Name: Alberto Song  MRN: [id number]  Patient Class: IP- Inpatient   Admission Date: 11/4/2023  Length of Stay: 2 days  Attending Physician: Shakira Fajardo MD  Primary Care Provider: Paolo Catalan MD        Subjective:     Principal Problem:Alcoholic liver disease        HPI:  47 Y/O diabetic, cigarette smoker, and daily ETOH uses/abuser M presents via EMS reporting generalized weakness, fall with closed head injury yesterday and difficulty ambulating secondary to gait instability.  No reported chest/back/abdominal pain.  He reports several months of worsening sensory loss to bilateral feet. Also reports increased alcohol use over last three months as well with associated decreased appetite po intake and as much as 100lb weight loss in the last three months. Reports drinking 1L of hard liqour daily. Triage reported questionable seizure activity, however he denies any falls or new injuries as use been on the sofa since he returned from hospital yesterday given his feeling of generalized lightheadedness and generalized weakness.  No headache, visual changes, ipsilateral numbness or weakness, vomiting/nausea or reported recent illness.    In the ED patient afebrile and hemodynamically stable saturating well on room air at rest. Patient alert and oriented and answering questions. Serum alcohol 205 on presentation though despite patient appears to be in early withdrawals. LFTs elevated AST>>>ALT. TB 6.5. INR 1.3. Alb 2.1, Ca 6.5, Mg 1.4. LA initially 6.8 but trended down to 4.6 after IVF. Initial imaging studies without acute process. Abdomen imaging pending. Patient started on aggressive IVFs and admitted to the care of medicine for further evaluation and management.       Overview/Hospital Course:  No notes on file    Interval History:   11/6: pateint sleepy - will decrease valium and check fro HE    Review of Systems   Constitutional:   Positive for activity change, appetite change, fatigue and unexpected weight change. Negative for chills, diaphoresis and fever.   HENT:  Negative for sore throat and trouble swallowing.    Eyes:  Negative for photophobia and visual disturbance.   Respiratory:  Negative for cough, shortness of breath and wheezing.    Cardiovascular:  Negative for chest pain, palpitations and leg swelling.   Gastrointestinal:  Negative for abdominal distention, abdominal pain, diarrhea, nausea and vomiting.   Genitourinary:  Negative for dysuria and hematuria.   Musculoskeletal:  Positive for gait problem. Negative for myalgias, neck pain and neck stiffness.   Skin:  Negative for color change and rash.   Neurological:  Positive for weakness, numbness and headaches. Negative for seizures, syncope and light-headedness.   Psychiatric/Behavioral:  Negative for confusion and decreased concentration.      Objective:     Vital Signs (Most Recent):  Temp: 97.5 °F (36.4 °C) (11/06/23 0753)  Pulse: 91 (11/06/23 0753)  Resp: 18 (11/06/23 0753)  BP: 124/85 (11/06/23 0753)  SpO2: 98 % (11/06/23 0753) Vital Signs (24h Range):  Temp:  [97.5 °F (36.4 °C)-98.8 °F (37.1 °C)] 97.5 °F (36.4 °C)  Pulse:  [] 91  Resp:  [16-18] 18  SpO2:  [97 %-99 %] 98 %  BP: (124-174)/(85-98) 124/85     Weight: 90.8 kg (200 lb 3.2 oz)  Body mass index is 24.37 kg/m².    Intake/Output Summary (Last 24 hours) at 11/6/2023 1111  Last data filed at 11/6/2023 0708  Gross per 24 hour   Intake --   Output 1100 ml   Net -1100 ml         Physical Exam  Constitutional:       General: He is not in acute distress.     Appearance: He is not toxic-appearing or diaphoretic.   HENT:      Head: Normocephalic.      Comments: Hematoma from fall     Nose: Nose normal.   Eyes:      General: Scleral icterus present.      Extraocular Movements: Extraocular movements intact.      Pupils: Pupils are equal, round, and reactive to light.   Cardiovascular:      Rate and Rhythm: Regular  rhythm. Tachycardia present.   Pulmonary:      Effort: Pulmonary effort is normal. No respiratory distress.      Breath sounds: No wheezing or rales.   Abdominal:      General: Abdomen is flat. There is no distension.      Palpations: Abdomen is soft.      Tenderness: There is no abdominal tenderness. There is no guarding.      Comments: Significant hepatomegaly   Musculoskeletal:         General: Normal range of motion.      Cervical back: Normal range of motion and neck supple. No rigidity.      Right lower leg: No edema.      Left lower leg: No edema.   Skin:     General: Skin is warm and dry.      Coloration: Skin is not jaundiced.   Neurological:      General: No focal deficit present.      Mental Status: He is alert and oriented to person, place, and time.      Cranial Nerves: No cranial nerve deficit.   Psychiatric:         Mood and Affect: Mood normal.         Behavior: Behavior normal.             Significant Labs: All pertinent labs within the past 24 hours have been reviewed.    Significant Imaging: I have reviewed all pertinent imaging results/findings within the past 24 hours.      Assessment/Plan:      * Alcoholic liver disease  - patient with severe nutrional deficits and acute alcoholic hepatitis / liver failure in setting of severe alcohol use/dependence and inadequate po intake with 100lb weight loss. Pancytopenia and multiple electrolyte derangements.  - repleting K+, Mg, Ca, (phos pending)  - CMP q8h and Mg Phos Q12h for now  - follow up US liver with doppler  - continue aggressive IVFs  - alcohol withdrawal management as below  - sp banana bag x1  - daily FA/MV/thiamine  - fall precautions  - seizure precautions  - monitor tele  - Hepatology consulted ; appreciate recs  - Nutrition consulted ; appreciate recs  - Jenelle's Discriminant Function for Alcoholic Hepatitis score 15. Will defer glucocorticoid therapy at this time  - further management pending clinical course and future study  review        Pancytopenia  - suspect secondary to prolonged severe nutritional deficiency though other etiologies still considered  - follow up iron/TIBC/ferritin/B6/B12/Folate  - continue to monitor      Type 2 diabetes mellitus without complication, without long-term current use of insulin  - SSI   - hypoglycemic protocol    Alcohol use disorder, severe, dependence  - valium 10mg po TID scheduled  - CIWA q4h  - ativan prn CIWA  - sp banana bag x1  - daily thiamine/FA/MV  - seizure precautions  - fall precautions        VTE Risk Mitigation (From admission, onward)         Ordered     IP VTE LOW RISK PATIENT  Once         11/04/23 2118     Place sequential compression device  Until discontinued         11/04/23 2118                Discharge Planning   HI: 11/6/2023     Code Status: Full Code   Is the patient medically ready for discharge?:     Reason for patient still in hospital (select all that apply): Patient trending condition                     Shakira Fajardo MD  Department of Hospital Medicine   [facility] - Telemetry Stepdown

## 2023-11-06 NOTE — CONSULTS
ADHD   AIMS   AUDIT   AUDIT-C   C-SSRS (Screen)   C-SSRS (Short)   C-SSRS (Full)   DAST   DAST-10   JAMISON-7   MMSE   MOCA   PCL-5   PHQ-9   LUCILLE   YMRS   :[id number]}          INITIAL VISIT: ADDICTION PSYCHIATRY CONSULTATION SERVICE        ASSESSMENT AND PLAN:      DIAGNOSES & PROBLEMS:  Alcohol Use Disorder, Severe, Dependence  MDD, Recurrent, Moderate  Nicotine Use     In Summary:  47yo M w/ hx of AUD, MDD, and Tobacco use per chart presenting after fall w/ close head injury. Admitted for management of Alcoholic Liver Disease. Consulted for alcohol withdrawal management and possible rehab post-discharge. Pt interviewed w/ father at bedside (Anesthesiologist from [place]). Pt provided initial HPI, then father provided HPI about pt at bedside, that conflicted but more correlated to father's HPI after reviewing pt's chart pertaining to psych and substance abuse hx. Last drink was last Saturday, 11/4 and currently on alcohol withdrawal meds/precautions by Primary. At this time, pt declined to undergo rehab after discharge (due to poor experience from previous two inpatient rehab experiences) but was amenable to pursue outpatient therapy after discharge if it is covered by his insurance.      Plan:  Continue scheduled Valium taper & multivitamins that include Thiamine, Folate, B12 w/ PRN Ativan, CIWA w/ VS checks q4hrs while awake.  Last drink 11/4; can resume taper for 5-7 days from that date  Pt declined to undergo rehab after discharge at this time.  Recommend to establish outpatient therapist which patient was amenable to after discharge (ambulatory order placed) .  PEC not indicated.  Inpatient psych hospitalization NOT anticipated.  Patient currently has capacity to decline medical treatment and leave against medical advise at this time.  Will sign-off. Please reach out if there are any additional questions/concerns from an addiction stand-point. Thank you for the consult.     PRESENTATION:      Alberto Song presents  with the following chief complaint: alcohol and/or drug addiction     Per Chart:  48 year old male with a psychiatry history of Alcohol Use Disorder, Nicotine Use, and medical history of Diabetes Mellitus presents via EMS reporting generalized weakness, fall with closed head injury yesterday, and difficulty ambulating secondary to gait instability.  No reported chest/back/abdominal pain.  He reports several months of worsening sensory loss to bilateral feet. Also reports increased alcohol use over last three months as well with associated decreased appetite po intake and as much as 100lb weight loss in the last three months. Reports drinking 1L of hard liqour daily. Triage reported questionable seizure activity, however he denies any falls or new injuries as use been on the sofa since he returned from hospital yesterday given his feeling of generalized lightheadedness and generalized weakness.  No headache, visual changes, ipsilateral numbness or weakness, vomiting/nausea or reported recent illness.     In the ED patient afebrile and hemodynamically stable saturating well on room air at rest. Patient alert and oriented and answering questions. Serum alcohol 205 on presentation though despite patient appears to be in early withdrawals. LFTs elevated AST>>>ALT. TB 6.5. INR 1.3. Alb 2.1, Ca 6.5, Mg 1.4. LA initially 6.8 but trended down to 4.6 after IVF. Initial imaging studies without acute process. Abdomen imaging pending. Patient started on aggressive IVFs and admitted to the care of medicine for further evaluation and management.      UDS-, Ethanol 205, salicylates - in ED     (C) alcohol abuse and withdrawal      Per Patient:  Patient seen in room on interview w/ father. A&Ox3. Patient stated that he has been drinking everyday since his wife passed last January. 750cc Vodka per day; patient has been daily nearly every day for the past year. Denied any binging episodes. Denied any other medication misuse. Denied  "any drug use. Declined to pursue detox after discharge b/c of previous unsuccessful detox (two previous inpatient attempt, one last year & one this year, [facility] detox & other in [place]; pt self terminated due to hoping to develop more insight, rather than hearing everyone "complain about their shit."). Denied taking any current psych meds. Denied SI/HI/AVH.    Pt amenable to pursue therapy and recommended to pursue/consider that route to better address drinking behaviors.    Collateral:   Jerry Song (Father)  [phone] ([place])   Father in room during interview (Anesthesiologist from [place])     6 previous detox w/ benzos & lcohol in past 2.5 years, drinking around the clock of 2/5 of Vodka for last 14wks.    2023; admitted to [facility] for pancreatitis, w/ psych consult by Dr Rios w/ dx of:   Alcohol Withdrawal Syndrome with Complication (delirium ; resolving / resolved)   Alcohol Use Disorder, Severe, Dependence   MDD, recurrent, moderate   Unspecified Insomnia     REVIEW OF SYSTEMS:  I[]I Patient denies any pertinent ROS, and none is known.  I[]I Patient unable or unwilling to provide any ROS.     [] Y  [x] N  sleep disturbance: **    [x] Y  [] N  appetite/weight change: ** 2/2 excessive alcohol use for last month  [] Y  [x] N  fatigue/anergia: **    [] Y  [x] N  impairment in focus/concentration: **        [x] Y  [] N  depression: ** "shitty" 2/2 wife . Amotivation. Denied SI/HI/AVH.  [] Y  [x] N  anxiety/worry: **     [] Y  [x] N  dysregulated mood/behavior: **   [] Y  [x] N  manic symptomatology: **     [] Y  [x] N  psychosis: **            A pertinent medical review of systems was performed with the following notable findings: see HPI     CURRENT PSYCHOTROPIC REGIMEN:  I[]I Y  I[x]I N  I[]I U          ADDICTION:      I[]I Y  I[]I N  I[]I U  I[x]I Current  I[]I Former  Nicotine Use: 1ppd (~30 years)  I[x]I Y  I[]I N  I[]I U  I[]I Current  I[]I Former  Alcohol " "Use: 750cc of Vodka  I[x]I Y  I[]I N  I[]I U  I[]I Current  I[]I Former  Alcohol Misuse/Abuse:   I[]I Y  I[x]I N  I[]I U  I[]I Current  I[]I Former  Illicit Drug Use/Misuse/Abuse:   I[]I Y  I[x]I N  I[]I U  I[]I Current  I[]I Former  Misuse/Abuse of Rx Medications:   I[]I Cannabis  I[]I Cocaine  I[]I Heroin  I[]I Meth  I[]I Opioids  I[]I Stimulants  I[]I Benzos  I[]I Other:      I[]I N/A  I[]I U  Substance(s) of Choice: Vodka  I[]I N/A  I[]I U  Substance(s) Used Currently/Recently: alcohol  I[]I N/A  I[]I U  Alcohol Consumption: 750cc of Vodka  I[]I N/A  I[]I U  Last Drink: Last Saturday 11/4  I[x]I N/A  I[]I U  Last Drug Use:   I[]I N/A  I[]I U  Duration of Sobriety/Abstinence: 3-4 months     I[x]I Y  I[]I N  I[]I U  Hx of Detox: not very good experience  I[]I Y  I[x]I N  I[]I U  Hx of Rehab:   I[]I Y  I[x]I N  I[]I U  Hx of IVDU:   I[]I Y  I[x]I N  I[]I U  Hx of Accidental Overdose:   I[x]I Y  I[]I N  I[]I U  Hx of DUI:   I[]I Y  I[x]I N  I[]I U  Hx of Complicated Withdrawal (i.e. Seizures and/or Delirium Tremens):   I[]I Y  I[x]I N  I[]I U  Hx of Known/Suspected Substance-Induced Psychiatric Disorder:   I[]I Y  I[x]I N  I[]I U  Hx of Medication Assisted Treatment:   I[]I Y  I[x]I N  I[]I U  Hx of Twelve Step Program (or Equivalent) Involvement:   I[]I Y  I[x]I N  I[]I U  Currently Exhibits Signs of Intoxication:   I[]I Y  I[x]I N  I[]I U  Currently Exhibits Signs of Withdrawal:   I[x]I Y  I[]I N  I[]I U  Currently Active in Recovery:   I[x]I Y  I[]I N  I[]I U  Social Support: father at bedside  I[]I Y  I[]I N  I[x]I U  Spouse/Partner Consumption:      I[]I N/A  I[x]I Y  I[]I N  I[]I U  Acknowledges/Accepts Addiction:   I[]I N/A  I[x]I Y  I[]I N  I[]I U  Advised to Quit/Cut Back:   I[]I N/A  I[x]I Y  I[]I N  I[]I U  Alcohol/Drug Cessation ("Wants to Quit"):   I[]I N/A  I[x]I Y  I[]I N  I[]I U  Motivation to Pursue Treatment: stated wants to quit but not through any formal program  I[]I N/A  I[]I Y  I[x]I N  I[]I U " " Tobacco Cessation ("Wants to Quit"):      DSM-5-TR SUBSTANCE USE DISORDER CRITERIA:      -- Impaired Control:  I[]I Y  I[x]I N  I[]I U  I[]I A  I[]I D  Often take in larger amounts or over a longer period of time than was intended:   I[x]I Y  I[]I N  I[]I U  I[]I A  I[]I D  Persistent desire or unsuccessful efforts to cut down or control use:   I[]I Y  I[x]I N  I[]I U  I[]I A  I[]I D  Great deal of time spent in activities necessary to obtain substance, use, or recover from effects:   I[]I Y  I[x]I N  I[]I U  I[]I A  I[]I D  Craving/strong desire for substance or urge to use:   -- Social Impairment:  I[]I Y  I[x]I N  I[]I U  I[]I A  I[]I D  Use resulting in failure to fulfill major role obligations at home, work or school:   I[]I Y  I[x]I N  I[]I U  I[]I A  I[]I D  Social, occupational, recreational activities decreased because of use:   I[]I Y  I[x]I N  I[]I U  I[]I A  I[]I D  Continued use despite having persistent or recurrent social or interpersonal problems caused or exacerbated by the substance:   -- Risky Use:  I[]I Y  I[x]I N  I[]I U  I[]I A  I[]I D  Recurrent use in situations in which it is physically hazardous:   I[x]I Y  I[]I N  I[]I U  I[]I A  I[]I D  Use despite physical or psychological problems that are likely to have been caused or exacerbated by the substance:   -- Neuroadaptation:  I[]I Y  I[x]I N  I[]I U  I[]I A  I[]I D  Tolerance, as defined by either of the following: (1) a need for markedly increased amounts of substance to achieve intoxication or desired effect.  -OR- (2) a markedly diminished effect with continued use of the same amount of substance:   I[]I Y  I[x]I N  I[]I U  I[]I A  I[]I D  Withdrawal, as manifested by either of the following: (1) the characteristic withdrawal syndrome for substance.  -OR- (2) substance is taken to relieve or avoid withdrawal symptoms:   -- Mild (1-3), Moderate (4-5), Severe (?6)     I[]I N/A  I[x]I Y  I[]I N  I[]I U  I[]I A  I[]I D  Active Substance Use " Disorder:         HISTORY:      I[]I Patient denies any history, and none is known.  I[]I Patient unable or unwilling to provide any history.     I[]I Y  I[x]I N  I[]I U  Psychiatric Diagnoses:   I[]I Y  I[x]I N  I[]I U  Current Psychiatric Provider (if Applicable):   I[]I Y  I[x]I N  I[]I U  Hx of Psychiatric Hospitalization:   I[]I Y  I[x]I N  I[]I U  Hx of Outpatient Psychiatric Treatment (psychiatry/psychotherapy):   I[]I Y  I[x]I N  I[]I U  Psychotropic Trials:   I[]I Y  I[x]I N  I[]I U  Prior Suicide Attempts:   I[]I Y  I[x]I N  I[]I U  Hx of Suicidal Ideation:   I[]I Y  I[x]I N  I[]I U  Hx of Homicidal Ideation:   I[]I Y  I[x]I N  I[]I U  Hx of Self-Injurious Behavior (Non-Suicidal):   I[]I Y  I[x]I N  I[]I U  Hx of Violence:   I[]I Y  I[x]I N  I[]I U  Documented Hx of Malingering:      FAMILY HISTORY:  I[]I Y  I[x]I N  I[]I U       I[]I Y  I[]I N  I[x]I U  Hx of Trauma/Neglect:   I[]I Y  I[]I N  I[x]I U  Hx of Physical Abuse:   I[]I Y  I[]I N  I[x]I U  Hx of Sexual Abuse:   I[]I Y  I[]I N  I[x]I U  Grew Up Locally?:   I[]I Y  I[]I N  I[x]I U  Happy Childhood?:   I[]I Y  I[]I N  I[x]I U  Significant Developmental Delay/Disability?:   I[]I Y  I[]I N  I[x]I U  GED/High School Dipoloma?:   I[]I Y  I[]I N  I[x]I U  Post High School Education?:   I[]I Y  I[x]I N  I[]I U  Currently Employed?:   I[]I Y  I[x]I N  I[]I U  On or Applying for Disability?:   I[]I Y  I[x]I N  I[]I U  Functions Independently?:   I[x]I Y  I[]I N  I[]I U  Financially Stable?:   I[]I Y  I[x]I N  I[]I U  Domiciled?:   I[]I Y  I[x]I N  I[]I U  Lives Alone?:   I[]I Y  I[x]I N  I[]I U  Heterosexual/Cisgender?:   I[]I Y  I[x]I N  I[]I U  Currently in a Romantic Relationship?:   I[x]I Y  I[]I N  I[]I U  Ever ?:   I[]I Y  I[x]I N  I[]I U  Children/Dependents?:   I[]I Y  I[x]I N  I[]I U  Druze/Spiritual?:   I[]I Y  I[x]I N  I[]I U   History?:   I[]I Y  I[x]I N  I[]I U  Current Legal Issues:   I[]I Y  I[]I N  I[x]I U  Past  "Charges/Convictions:   I[]I Y  I[]I N  I[x]I U  Hx of Incarceration:   I[]I Y  I[]I N  I[x]I U  Engaged in Hobbies/Recreational Activities?:   I[]I Y  I[]I N  I[]I U  Access to a Gun?:   NOTE: patient counseled on gun safety, including safe storage.  NOTE: patient counseled on inherent risks associated with gun ownership.    I[x]I Y  I[]I N  I[]I U  Hx of Seizure:   I[x]I Y  I[]I N  I[]I U  Hx of Significant Head Trauma (e.g., Loss of Consciousness, Concussion, Coma):    I[x]I Y  I[]I N  I[]I U  Medical History & Diagnoses:        The patient's past medical history has been reviewed and updated as appropriate within the electronic medical record system.     Scheduled and PRN Medications: The electronic chart was reviewed and updated as appropriate.  See Medcard for details.     Allergies:  Patient has no known allergies.     PSYCHOSOCIAL FACTORS:  Stressors (Biopsychosocial, Cultural and Environmental): mental health, physical health, substance use/addiction, loneliness, grief.  Functioning Relationships: alone & isolated     STRENGTHS AND LIABILITIES:   Strength: Patient is intelligent.  Strength: Patient has positive support network.  Strength: Patient has reasonable judgment.  Strength: Patient is seeking help.  Liability: Patient is defensive.  Liability: reluctant to pursue rehab     Additional Relevant History, As Applicable:      EXAMINATION:      BP (!) 156/98   Pulse 99   Temp 98.7 °F (37.1 °C) (Oral)   Resp 16   Ht 6' 4" (1.93 m)   Wt 90.8 kg (200 lb 3.2 oz)   SpO2 97%   BMI 24.37 kg/m²      MENTAL STATUS EXAMINATION:  General Appearance: **   dressed in hospital garb, appears older than stated age, poorly groomed  Behavior: **   cooperative  Involuntary Movements and Motor Activity: **   no abnormal involuntary movements noted, no psychomotor agitation or retardation  Gait and Station: **   unable to assess - patient lying down or seated  Speech and Language: **   conversational, spontaneous, " "speaks and understands English proficiently  Mood: "ok"  Affect: **   reactive, constricted  Thought Process and Associations: **   linear and goal-directed, with no loosening of associations  Thought Content and Perceptions: **   no suicidal or homicidal ideation, no auditory or visual hallucinations, no paranoid ideation, no ideas of reference, no evidence of delusions or psychosis  Sensorium: **   alert and oriented, with clear sensorium  Recent and Remote Memory: **   grossly intact, no significant impairments noted  Attention and Concentration: **   attentive, not readily distractible  Fund of Knowledge: **   grossly intact, used appropriate vocabulary, no significant deficits noted  Insight: **   demonstrates awareness of illness, limited/partial awareness of situation  Judgment: **   intact, behavior is adequate/appropriate given the circumstances        RISK MANAGEMENT:      I[]I Y  I[x]I N  I[]I U  I[]I A  Suicidal Ideation/Behavior: ** no active ideation, no plan, no intent  I[]I Y  I[x]I N  I[]I U  I[]I A  Homicidal Ideation/Behavior: **  I[]I Y  I[x]I N  I[]I U  I[]I A  Violence: **  I[]I Y  I[x]I N  I[]I U  I[]I A  Self-Injurious Behavior: **     The patient is deemed to be poor historian w/ father filling hx.     I[x]I Y  I[]I N  I[]I U  I[]I A  I[]I N/A  Minimization of Risk Parameters Suspected/Evident: **  I[]I Y  I[x]I N  I[]I U  I[]I A  I[]I N/A  Exaggeration of Risk Parameters Suspected/Evident: **     [] Y  [x] N  Danger to Self:   [] Y  [x] N  Danger to Others:   [] Y  [] N  Grave Disability:      In cases of emergency, daily coverage provided by Acute/ER Psych MD, NP, PA, or SW, with contact numbers located in [facility] On Call Schedule.     [facility]  Department of Psychiatry  [facility]          KEY:      I[]I Y = Yes / Present / Endorses  I[]I N = No / Absent / Denies  I[]I U = Unknown / Unable to Assess / Unwilling to Participate  I[]I A = Ambiguity Exists / " Accuracy Uncertain  I[]I D = Denial or Minimization is Suspected/Evident  I[]I N/A = Non-Applicable     CHART REVIEW:      Available documentation has been reviewed, and pertinent elements of the chart have been incorporated into this evaluation where appropriate.     The patient's last Epic encounter in the psychiatry department was on: Visit date not found  The patient's first Epic encounter in the psychiatry department was on:       LA/MS  AWARE  Site reviewed - No recent entries are noted.     ADVICE AND COUNSELING:      [x] In cases of emergencies (e.g. SI/HI resulting in danger to self or others, functioning deteriorates to the level of grave disability), call 911 or [phone], or present to the emergency department for immediate assistance.  [x] Patient should not operate a motor vehicle or heavy machinery if effects of medications or underlying symptoms/condition impair the ability to safely do so.     Alcohol, Tobacco, and Drug Counseling, as well as resources, has been provided, as warranted.      Shared medical decision making and informed consent are the hallmark and bedrock of good clinical care, and as such have been employed and obtained, respectively, to the degree possible.       Risk Mitigation Strategies, Harm Reduction Techniques, and Safety Netting are important interventions that can reduce acute and chronic risk, and as such have been employed to the degree possible.     Prescription Drug Management entails the review, recommendation, or consideration without recommendation of medications, and as such was employed during the encounter.     Additional Psychoeducation has been provided, as warranted.     Discussed, to the extent possible, diagnosis, risks and benefits of proposed treatment vs alternative treatments vs no treatment, potential side effects of these treatments and the inherent unpredictability of treatment. The patient expresses understanding of the above and displays the capacity to  agree with this treatment given said understanding. Patient also agrees that, currently, the benefits outweigh the risks and consents to treatment at this time.      Written material has been provided to supplement, augment, and reinforce any discussions and interventions, via the AVS or other pre-printed handouts, as warranted.        DIAGNOSTIC TESTING:      The chart was reviewed for recent diagnostic procedures and investigations, and pertinent results are noted below.     Na 133 (L)  11/5/2023   K 2.7 (LL)  11/5/2023   Ca 7.5 (L)  11/5/2023  Phos 1.4 (L)  11/5/2023   Mg 1.4 (L)  11/5/2023      Glu 142 (H)  11/5/2023   HgA1c 5.7 (H)  11/4/2023     BUN 7  11/5/2023   Cr 0.5  11/5/2023   GFR >60.0  11/5/2023   Specific [place] 1.010  11/4/2023   Protein (Urine) 1+ (A)  11/4/2023   Microalbumin *   *      T Prot 4.7 (L)  11/5/2023   Alb 2.4 (L)  11/5/2023   T Bili 10.8 (H)  11/5/2023   Alk Phos 291 (H)  11/5/2023    (H)  11/5/2023   ALT 86 (H)  11/5/2023   GGT 66 (H)  7/6/2023   NH3 *   *   Amylase 35  1/26/2023   Lipase 28  1/26/2023     TSH 1.810  11/4/2023   Free T4 *   *  PTH *   *  Prolactin *   *   CPK *   *   Troponin I 0.018  1/5/2023   PT 13.5 (H)  11/5/2023   INR 1.3 (H)  11/5/2023     WBC 2.51 (L)  11/5/2023   RBC 2.25 (L)  11/5/2023   Hgb 7.6 (L)  11/5/2023   HCT 22.1 (L)  11/5/2023   MCV 98  11/5/2023  PLT 54 (L)  11/5/2023   ANC 1.6; 64.1 (L);   11/5/2023     Cholesterol 173  6/8/2023   Triglycerides 69  6/8/2023   .2  6/8/2023   HDL 42  6/8/2023      B12 1127 (H)  11/4/2023   Folate 2.2 (L)  11/5/2023   Thiamine *   *   Vit D *   *      HIV 1/2 Ag/Ab Non-reactive  6/16/2023   Hep B Surface *   *   Hep B Core *   *   Hep A *   *   Hep C Non-reactive  6/16/2023   RPR *   *      Lithium *   *   VPA *   *   Clozapine *   *      Alcohol *   *   Benzodiazepines Negative  11/4/2023   Barbiturates Negative  11/4/2023   Cannabis Negative   11/4/2023   Cocaine Negative  11/4/2023   Amphetamines Negative  11/4/2023   PCP Negative  11/4/2023   Opiates Negative  11/4/2023   Methadone Negative  11/4/2023   Buprenorphine *   *   Fentanyl *   *   Oxycodone Negative  1/5/2023   Tramadol *   *      Ethanol 205 (H)  11/4/2023  PETH *   *   EtG *   *   EtS *   *   Buprenorphine *   *   Norbuprenorphine *   *          Results for orders placed or performed during the hospital encounter of 11/04/23   EKG 12-lead     Collection Time: 11/04/23  3:41 PM     Narrative     Test Reason : W19.XXXA,     Vent. Rate : 095 BPM     Atrial Rate : 095 BPM     P-R Int : 176 ms          QRS Dur : 084 ms      QT Int : 342 ms       P-R-T Axes : 077 043 142 degrees     QTc Int : 429 ms     Normal sinus rhythm  Septal infarct ,age undetermined  ST and T wave abnormality, consider inferolateral ischemia  Abnormal ECG  When compared with ECG of 05-JAN-2023 13:11,  Septal infarct is now Present  Non-specific change in ST segment in Anterior leads  T wave inversion now evident in Inferior leads  T wave inversion now evident in Lateral leads  Confirmed by MELLISSA GONZALES, HOMEYAR ([id number]) on 11/5/2023 9:51:08 AM     Referred By: [email or web address]   SELF           Confirmed By:JENN MALLOY MD             Results for orders placed or performed during the hospital encounter of 11/04/23   CT Head Without Contrast     Narrative     EXAMINATION:  CT HEAD WITHOUT CONTRAST     CLINICAL HISTORY:  Trauma;     TECHNIQUE:  Low dose axial CT images obtained throughout the head without intravenous contrast. Sagittal and coronal reconstructions were performed.     COMPARISON:  None.     FINDINGS:  Intracranial compartment:     Ventricles and sulci are normal in size for age without evidence of hydrocephalus. No extra-axial blood or fluid collections.     Moderate involutional changes and chronic microvascular ischemic changes in the periventricular white matter.     Superficial right periorbital  "contusion/hematoma.     Globes are within normal limits.  No parenchymal mass, hemorrhage, edema or major vascular distribution infarct.     Skull/extracranial contents (limited evaluation): No fracture. Mild right ethmoid and right maxillary sinus disease.        Impression     1. No acute intracranial process.  2. Superficial right periorbital contusion/hematoma.  3. Mild paranasal sinus disease.        Electronically signed by:               Familia Hollingsworth  Date:                                                           11/04/2023  Time:                                                           15:31         CONSULTATION:      A diagnostic psychiatric evaluation was performed and responsiveness to treatment was assessed.  The patient demonstrates adequate ability/capacity to respond to treatment.     Consults     - The consulting clinician was informed of the encounter documentation.  - The treatment team was informed of the encounter documentation.     Kuldeep Cotton MD ([place])  [facility], PGY-II        "

## 2023-11-07 PROBLEM — Z51.5 PALLIATIVE CARE ENCOUNTER: Status: ACTIVE | Noted: 2023-11-07

## 2023-11-07 LAB
A1AT SERPL-MCNC: 153 MG/DL (ref 100–190)
AFP SERPL-MCNC: 2.2 NG/ML (ref 0–8.4)
ALBUMIN SERPL BCP-MCNC: 2.2 G/DL (ref 3.5–5.2)
ALP SERPL-CCNC: 271 U/L (ref 55–135)
ALT SERPL W/O P-5'-P-CCNC: 61 U/L (ref 10–44)
ANION GAP SERPL CALC-SCNC: 8 MMOL/L (ref 8–16)
AST SERPL-CCNC: 205 U/L (ref 10–40)
BASOPHILS # BLD AUTO: 0.03 K/UL (ref 0–0.2)
BASOPHILS # BLD AUTO: 0.04 K/UL (ref 0–0.2)
BASOPHILS NFR BLD: 1.2 % (ref 0–1.9)
BASOPHILS NFR BLD: 1.6 % (ref 0–1.9)
BILIRUB SERPL-MCNC: 10.9 MG/DL (ref 0.1–1)
BUN SERPL-MCNC: 10 MG/DL (ref 6–20)
CALCIUM SERPL-MCNC: 7.7 MG/DL (ref 8.7–10.5)
CERULOPLASMIN SERPL-MCNC: 32 MG/DL (ref 15–45)
CHLORIDE SERPL-SCNC: 100 MMOL/L (ref 95–110)
CO2 SERPL-SCNC: 27 MMOL/L (ref 23–29)
CREAT SERPL-MCNC: 0.5 MG/DL (ref 0.5–1.4)
DIFFERENTIAL METHOD: ABNORMAL
DIFFERENTIAL METHOD: ABNORMAL
EOSINOPHIL # BLD AUTO: 0.1 K/UL (ref 0–0.5)
EOSINOPHIL # BLD AUTO: 0.1 K/UL (ref 0–0.5)
EOSINOPHIL NFR BLD: 2.4 % (ref 0–8)
EOSINOPHIL NFR BLD: 2.4 % (ref 0–8)
ERYTHROCYTE [DISTWIDTH] IN BLOOD BY AUTOMATED COUNT: 15.1 % (ref 11.5–14.5)
ERYTHROCYTE [DISTWIDTH] IN BLOOD BY AUTOMATED COUNT: 15.6 % (ref 11.5–14.5)
EST. GFR  (NO RACE VARIABLE): >60 ML/MIN/1.73 M^2
FERRITIN SERPL-MCNC: 5865 NG/ML (ref 20–300)
GLUCOSE SERPL-MCNC: 133 MG/DL (ref 70–110)
HCT VFR BLD AUTO: 22.1 % (ref 40–54)
HCT VFR BLD AUTO: 23.1 % (ref 40–54)
HGB BLD-MCNC: 7.4 G/DL (ref 14–18)
HGB BLD-MCNC: 7.6 G/DL (ref 14–18)
IGG SERPL-MCNC: 536 MG/DL (ref 650–1600)
IMM GRANULOCYTES # BLD AUTO: 0.04 K/UL (ref 0–0.04)
IMM GRANULOCYTES # BLD AUTO: 0.05 K/UL (ref 0–0.04)
IMM GRANULOCYTES NFR BLD AUTO: 1.6 % (ref 0–0.5)
IMM GRANULOCYTES NFR BLD AUTO: 2 % (ref 0–0.5)
INR PPP: 1.3 (ref 0.8–1.2)
IRON SERPL-MCNC: 85 UG/DL (ref 45–160)
LYMPHOCYTES # BLD AUTO: 0.8 K/UL (ref 1–4.8)
LYMPHOCYTES # BLD AUTO: 0.9 K/UL (ref 1–4.8)
LYMPHOCYTES NFR BLD: 32.3 % (ref 18–48)
LYMPHOCYTES NFR BLD: 37.6 % (ref 18–48)
MCH RBC QN AUTO: 34.7 PG (ref 27–31)
MCH RBC QN AUTO: 35.2 PG (ref 27–31)
MCHC RBC AUTO-ENTMCNC: 32.9 G/DL (ref 32–36)
MCHC RBC AUTO-ENTMCNC: 33.5 G/DL (ref 32–36)
MCV RBC AUTO: 105 FL (ref 82–98)
MCV RBC AUTO: 106 FL (ref 82–98)
MITOCHONDRIA AB TITR SER IF: NORMAL {TITER}
MONOCYTES # BLD AUTO: 0.3 K/UL (ref 0.3–1)
MONOCYTES # BLD AUTO: 0.3 K/UL (ref 0.3–1)
MONOCYTES NFR BLD: 10.6 % (ref 4–15)
MONOCYTES NFR BLD: 11.4 % (ref 4–15)
NEUTROPHILS # BLD AUTO: 1.1 K/UL (ref 1.8–7.7)
NEUTROPHILS # BLD AUTO: 1.3 K/UL (ref 1.8–7.7)
NEUTROPHILS NFR BLD: 46.6 % (ref 38–73)
NEUTROPHILS NFR BLD: 50.3 % (ref 38–73)
NRBC BLD-RTO: 2 /100 WBC
NRBC BLD-RTO: 3 /100 WBC
PLATELET # BLD AUTO: 87 K/UL (ref 150–450)
PLATELET # BLD AUTO: 88 K/UL (ref 150–450)
PMV BLD AUTO: 11 FL (ref 9.2–12.9)
PMV BLD AUTO: 11.1 FL (ref 9.2–12.9)
POCT GLUCOSE: 143 MG/DL (ref 70–110)
POCT GLUCOSE: 184 MG/DL (ref 70–110)
POCT GLUCOSE: 187 MG/DL (ref 70–110)
POCT GLUCOSE: 199 MG/DL (ref 70–110)
POCT GLUCOSE: 235 MG/DL (ref 70–110)
POTASSIUM SERPL-SCNC: 2.8 MMOL/L (ref 3.5–5.1)
PROT SERPL-MCNC: 4.5 G/DL (ref 6–8.4)
PROTHROMBIN TIME: 13.6 SEC (ref 9–12.5)
RBC # BLD AUTO: 2.1 M/UL (ref 4.6–6.2)
RBC # BLD AUTO: 2.19 M/UL (ref 4.6–6.2)
SATURATED IRON: 49 % (ref 20–50)
SMOOTH MUSCLE AB TITR SER IF: NORMAL {TITER}
SODIUM SERPL-SCNC: 135 MMOL/L (ref 136–145)
TOTAL IRON BINDING CAPACITY: 175 UG/DL (ref 250–450)
TRANSFERRIN SERPL-MCNC: 118 MG/DL (ref 200–375)
WBC # BLD AUTO: 2.45 K/UL (ref 3.9–12.7)
WBC # BLD AUTO: 2.54 K/UL (ref 3.9–12.7)

## 2023-11-07 PROCEDURE — 36415 COLL VENOUS BLD VENIPUNCTURE: CPT | Performed by: HOSPITALIST

## 2023-11-07 PROCEDURE — S4991 NICOTINE PATCH NONLEGEND: HCPCS | Performed by: STUDENT IN AN ORGANIZED HEALTH CARE EDUCATION/TRAINING PROGRAM

## 2023-11-07 PROCEDURE — 93010 EKG 12-LEAD: ICD-10-PCS | Mod: ,,, | Performed by: INTERNAL MEDICINE

## 2023-11-07 PROCEDURE — 97161 PT EVAL LOW COMPLEX 20 MIN: CPT

## 2023-11-07 PROCEDURE — 99900035 HC TECH TIME PER 15 MIN (STAT)

## 2023-11-07 PROCEDURE — 93010 ELECTROCARDIOGRAM REPORT: CPT | Mod: ,,, | Performed by: INTERNAL MEDICINE

## 2023-11-07 PROCEDURE — 20600001 HC STEP DOWN PRIVATE ROOM

## 2023-11-07 PROCEDURE — 63600175 PHARM REV CODE 636 W HCPCS: Performed by: FAMILY MEDICINE

## 2023-11-07 PROCEDURE — 85025 COMPLETE CBC W/AUTO DIFF WBC: CPT | Mod: 91 | Performed by: HOSPITALIST

## 2023-11-07 PROCEDURE — 97110 THERAPEUTIC EXERCISES: CPT

## 2023-11-07 PROCEDURE — 86015 ACTIN ANTIBODY EACH: CPT | Performed by: STUDENT IN AN ORGANIZED HEALTH CARE EDUCATION/TRAINING PROGRAM

## 2023-11-07 PROCEDURE — 99233 SBSQ HOSP IP/OBS HIGH 50: CPT | Mod: ,,, | Performed by: HOSPITALIST

## 2023-11-07 PROCEDURE — 80321 ALCOHOLS BIOMARKERS 1OR 2: CPT | Performed by: STUDENT IN AN ORGANIZED HEALTH CARE EDUCATION/TRAINING PROGRAM

## 2023-11-07 PROCEDURE — 25000003 PHARM REV CODE 250: Performed by: STUDENT IN AN ORGANIZED HEALTH CARE EDUCATION/TRAINING PROGRAM

## 2023-11-07 PROCEDURE — 94761 N-INVAS EAR/PLS OXIMETRY MLT: CPT

## 2023-11-07 PROCEDURE — 27100171 HC OXYGEN HIGH FLOW UP TO 24 HOURS

## 2023-11-07 PROCEDURE — 86038 ANTINUCLEAR ANTIBODIES: CPT | Performed by: STUDENT IN AN ORGANIZED HEALTH CARE EDUCATION/TRAINING PROGRAM

## 2023-11-07 PROCEDURE — 82728 ASSAY OF FERRITIN: CPT | Performed by: STUDENT IN AN ORGANIZED HEALTH CARE EDUCATION/TRAINING PROGRAM

## 2023-11-07 PROCEDURE — 25000003 PHARM REV CODE 250: Performed by: FAMILY MEDICINE

## 2023-11-07 PROCEDURE — 63600175 PHARM REV CODE 636 W HCPCS: Performed by: HOSPITALIST

## 2023-11-07 PROCEDURE — 99223 1ST HOSP IP/OBS HIGH 75: CPT | Mod: ,,, | Performed by: INTERNAL MEDICINE

## 2023-11-07 PROCEDURE — 93005 ELECTROCARDIOGRAM TRACING: CPT

## 2023-11-07 PROCEDURE — 97116 GAIT TRAINING THERAPY: CPT

## 2023-11-07 PROCEDURE — 99223 PR INITIAL HOSPITAL CARE,LEVL III: ICD-10-PCS | Mod: ,,, | Performed by: INTERNAL MEDICINE

## 2023-11-07 PROCEDURE — 99233 PR SUBSEQUENT HOSPITAL CARE,LEVL III: ICD-10-PCS | Mod: ,,, | Performed by: HOSPITALIST

## 2023-11-07 PROCEDURE — 36415 COLL VENOUS BLD VENIPUNCTURE: CPT | Performed by: FAMILY MEDICINE

## 2023-11-07 PROCEDURE — 85610 PROTHROMBIN TIME: CPT | Performed by: FAMILY MEDICINE

## 2023-11-07 PROCEDURE — 82784 ASSAY IGA/IGD/IGG/IGM EACH: CPT | Performed by: STUDENT IN AN ORGANIZED HEALTH CARE EDUCATION/TRAINING PROGRAM

## 2023-11-07 PROCEDURE — 84466 ASSAY OF TRANSFERRIN: CPT | Performed by: STUDENT IN AN ORGANIZED HEALTH CARE EDUCATION/TRAINING PROGRAM

## 2023-11-07 PROCEDURE — 86381 MITOCHONDRIAL ANTIBODY EACH: CPT | Performed by: STUDENT IN AN ORGANIZED HEALTH CARE EDUCATION/TRAINING PROGRAM

## 2023-11-07 PROCEDURE — 82390 ASSAY OF CERULOPLASMIN: CPT | Performed by: STUDENT IN AN ORGANIZED HEALTH CARE EDUCATION/TRAINING PROGRAM

## 2023-11-07 PROCEDURE — 80053 COMPREHEN METABOLIC PANEL: CPT | Performed by: HOSPITALIST

## 2023-11-07 PROCEDURE — 97535 SELF CARE MNGMENT TRAINING: CPT

## 2023-11-07 PROCEDURE — 94660 CPAP INITIATION&MGMT: CPT

## 2023-11-07 PROCEDURE — 82105 ALPHA-FETOPROTEIN SERUM: CPT | Performed by: STUDENT IN AN ORGANIZED HEALTH CARE EDUCATION/TRAINING PROGRAM

## 2023-11-07 PROCEDURE — 25000003 PHARM REV CODE 250: Performed by: HOSPITALIST

## 2023-11-07 PROCEDURE — 82103 ALPHA-1-ANTITRYPSIN TOTAL: CPT | Performed by: STUDENT IN AN ORGANIZED HEALTH CARE EDUCATION/TRAINING PROGRAM

## 2023-11-07 PROCEDURE — 85025 COMPLETE CBC W/AUTO DIFF WBC: CPT | Performed by: FAMILY MEDICINE

## 2023-11-07 RX ORDER — MAGNESIUM SULFATE HEPTAHYDRATE 40 MG/ML
2 INJECTION, SOLUTION INTRAVENOUS ONCE
Status: COMPLETED | OUTPATIENT
Start: 2023-11-07 | End: 2023-11-07

## 2023-11-07 RX ORDER — POTASSIUM CHLORIDE 7.45 MG/ML
10 INJECTION INTRAVENOUS
Status: COMPLETED | OUTPATIENT
Start: 2023-11-07 | End: 2023-11-07

## 2023-11-07 RX ORDER — DIAZEPAM 2 MG/1
2 TABLET ORAL EVERY 12 HOURS
Status: DISCONTINUED | OUTPATIENT
Start: 2023-11-07 | End: 2023-11-08

## 2023-11-07 RX ADMIN — FOLIC ACID 1 MG: 1 TABLET ORAL at 09:11

## 2023-11-07 RX ADMIN — THIAMINE HYDROCHLORIDE 500 MG: 100 INJECTION, SOLUTION INTRAMUSCULAR; INTRAVENOUS at 09:11

## 2023-11-07 RX ADMIN — POTASSIUM CHLORIDE 10 MEQ: 7.46 INJECTION, SOLUTION INTRAVENOUS at 02:11

## 2023-11-07 RX ADMIN — LACTULOSE 30 G: 20 SOLUTION ORAL at 02:11

## 2023-11-07 RX ADMIN — POTASSIUM CHLORIDE 10 MEQ: 7.46 INJECTION, SOLUTION INTRAVENOUS at 01:11

## 2023-11-07 RX ADMIN — LACTULOSE 30 G: 20 SOLUTION ORAL at 08:11

## 2023-11-07 RX ADMIN — POTASSIUM CHLORIDE 10 MEQ: 7.46 INJECTION, SOLUTION INTRAVENOUS at 04:11

## 2023-11-07 RX ADMIN — RIFAXIMIN 550 MG: 550 TABLET ORAL at 08:11

## 2023-11-07 RX ADMIN — POTASSIUM BICARBONATE 50 MEQ: 978 TABLET, EFFERVESCENT ORAL at 12:11

## 2023-11-07 RX ADMIN — CIPROFLOXACIN HYDROCHLORIDE: 3 OINTMENT OPHTHALMIC at 02:11

## 2023-11-07 RX ADMIN — MAGNESIUM SULFATE 2 G: 2 INJECTION INTRAVENOUS at 06:11

## 2023-11-07 RX ADMIN — POTASSIUM & SODIUM PHOSPHATES POWDER PACK 280-160-250 MG 2 PACKET: 280-160-250 PACK at 05:11

## 2023-11-07 RX ADMIN — LACTULOSE 30 G: 20 SOLUTION ORAL at 09:11

## 2023-11-07 RX ADMIN — CIPROFLOXACIN HYDROCHLORIDE: 3 OINTMENT OPHTHALMIC at 08:11

## 2023-11-07 RX ADMIN — THERA TABS 1 TABLET: TAB at 09:11

## 2023-11-07 RX ADMIN — DIAZEPAM 2 MG: 2 TABLET ORAL at 08:11

## 2023-11-07 RX ADMIN — THIAMINE HYDROCHLORIDE 500 MG: 100 INJECTION, SOLUTION INTRAMUSCULAR; INTRAVENOUS at 02:11

## 2023-11-07 RX ADMIN — POTASSIUM CHLORIDE 10 MEQ: 7.46 INJECTION, SOLUTION INTRAVENOUS at 12:11

## 2023-11-07 RX ADMIN — POTASSIUM BICARBONATE 50 MEQ: 978 TABLET, EFFERVESCENT ORAL at 02:11

## 2023-11-07 RX ADMIN — CIPROFLOXACIN HYDROCHLORIDE: 3 OINTMENT OPHTHALMIC at 09:11

## 2023-11-07 RX ADMIN — DIAZEPAM 2 MG: 2 TABLET ORAL at 09:11

## 2023-11-07 RX ADMIN — INSULIN ASPART 2 UNITS: 100 INJECTION, SOLUTION INTRAVENOUS; SUBCUTANEOUS at 12:11

## 2023-11-07 RX ADMIN — RIFAXIMIN 550 MG: 550 TABLET ORAL at 09:11

## 2023-11-07 RX ADMIN — Medication 1 PATCH: at 09:11

## 2023-11-07 NOTE — PROGRESS NOTES
[facility] Medicine  Progress Note    Patient Name: Alberto Song  MRN: [id number]  Patient Class: IP- Inpatient   Admission Date: 11/4/2023  Length of Stay: 3 days  Attending Physician: Shakira Fajardo MD  Primary Care Provider: Paolo Catalan MD        Subjective:     Principal Problem:Alcoholic liver disease        HPI:  49 Y/O diabetic, cigarette smoker, and daily ETOH uses/abuser M presents via EMS reporting generalized weakness, fall with closed head injury yesterday and difficulty ambulating secondary to gait instability.  No reported chest/back/abdominal pain.  He reports several months of worsening sensory loss to bilateral feet. Also reports increased alcohol use over last three months as well with associated decreased appetite po intake and as much as 100lb weight loss in the last three months. Reports drinking 1L of hard liqour daily. Triage reported questionable seizure activity, however he denies any falls or new injuries as use been on the sofa since he returned from hospital yesterday given his feeling of generalized lightheadedness and generalized weakness.  No headache, visual changes, ipsilateral numbness or weakness, vomiting/nausea or reported recent illness.    In the ED patient afebrile and hemodynamically stable saturating well on room air at rest. Patient alert and oriented and answering questions. Serum alcohol 205 on presentation though despite patient appears to be in early withdrawals. LFTs elevated AST>>>ALT. TB 6.5. INR 1.3. Alb 2.1, Ca 6.5, Mg 1.4. LA initially 6.8 but trended down to 4.6 after IVF. Initial imaging studies without acute process. Abdomen imaging pending. Patient started on aggressive IVFs and admitted to the care of medicine for further evaluation and management.       Overview/Hospital Course:  No notes on file    Interval History:   11/7: patient much more awake today. Working with PT this morning. We discussed the severity of his  alcohol abuse and that his medical issues including malnutrition and weakness can be directly linked back to alcohol intake. Recommending inpatient treatment for alcohol but patient does not appear interested. Overall prognosis grim without commitment to sustained sobriety.     Review of Systems   Constitutional:  Positive for activity change, appetite change, fatigue and unexpected weight change. Negative for chills, diaphoresis and fever.   HENT:  Negative for sore throat and trouble swallowing.    Eyes:  Negative for photophobia and visual disturbance.   Respiratory:  Negative for cough, shortness of breath and wheezing.    Cardiovascular:  Negative for chest pain, palpitations and leg swelling.   Gastrointestinal:  Negative for abdominal distention, abdominal pain, diarrhea, nausea and vomiting.   Genitourinary:  Negative for dysuria and hematuria.   Musculoskeletal:  Positive for gait problem. Negative for myalgias, neck pain and neck stiffness.   Skin:  Negative for color change and rash.   Neurological:  Positive for weakness, numbness and headaches. Negative for seizures, syncope and light-headedness.   Psychiatric/Behavioral:  Negative for confusion and decreased concentration.      Objective:     Vital Signs (Most Recent):  Temp: 98.8 °F (37.1 °C) (11/07/23 1143)  Pulse: 85 (11/07/23 1143)  Resp: 18 (11/07/23 1143)  BP: 118/81 (11/07/23 1143)  SpO2: 98 % (11/07/23 1143) Vital Signs (24h Range):  Temp:  [98.4 °F (36.9 °C)-98.8 °F (37.1 °C)] 98.8 °F (37.1 °C)  Pulse:  [] 85  Resp:  [18] 18  SpO2:  [97 %-98 %] 98 %  BP: (113-127)/(77-86) 118/81     Weight: 90.8 kg (200 lb 3.2 oz)  Body mass index is 24.37 kg/m².    Intake/Output Summary (Last 24 hours) at 11/7/2023 1427  Last data filed at 11/7/2023 0408  Gross per 24 hour   Intake --   Output 1200 ml   Net -1200 ml         Physical Exam  Constitutional:       General: He is not in acute distress.     Appearance: He is not toxic-appearing or  diaphoretic.   HENT:      Head: Normocephalic.      Comments: Hematoma from fall     Nose: Nose normal.   Eyes:      General: Scleral icterus present.      Extraocular Movements: Extraocular movements intact.      Pupils: Pupils are equal, round, and reactive to light.   Cardiovascular:      Rate and Rhythm: Regular rhythm. Tachycardia present.   Pulmonary:      Effort: Pulmonary effort is normal. No respiratory distress.      Breath sounds: No wheezing or rales.   Abdominal:      General: Abdomen is flat. There is no distension.      Palpations: Abdomen is soft.      Tenderness: There is no abdominal tenderness. There is no guarding.      Comments: Significant hepatomegaly   Musculoskeletal:         General: Normal range of motion.      Cervical back: Normal range of motion and neck supple. No rigidity.      Right lower leg: No edema.      Left lower leg: No edema.   Skin:     General: Skin is warm and dry.      Coloration: Skin is not jaundiced.   Neurological:      General: No focal deficit present.      Mental Status: He is alert and oriented to person, place, and time.      Cranial Nerves: No cranial nerve deficit.   Psychiatric:         Mood and Affect: Mood normal.         Behavior: Behavior normal.             Significant Labs: All pertinent labs within the past 24 hours have been reviewed.    Significant Imaging: I have reviewed all pertinent imaging results/findings within the past 24 hours.      Assessment/Plan:      * Alcoholic liver disease  - patient with severe nutrional deficits and acute alcoholic hepatitis / liver failure in setting of severe alcohol use/dependence and inadequate po intake with 100lb weight loss. Pancytopenia and multiple electrolyte derangements.  - repleting K+, Mg, Ca, (phos pending)  - CMP q8h and Mg Phos Q12h for now  - follow up US liver with doppler  - continue aggressive IVFs  - alcohol withdrawal management as below  - sp banana bag x1  - daily FA/MV/thiamine  - fall  precautions  - seizure precautions  - monitor tele  - Hepatology consulted ; appreciate recs  - Nutrition consulted ; appreciate recs  - Jenelle's Discriminant Function for Alcoholic Hepatitis score 15. Will defer glucocorticoid therapy at this time  - further management pending clinical course and future study review        Pancytopenia  - suspect secondary to prolonged severe nutritional deficiency though other etiologies still considered  - follow up iron/TIBC/ferritin/B6/B12/Folate  - continue to monitor      Type 2 diabetes mellitus without complication, without long-term current use of insulin  - SSI   - hypoglycemic protocol    Alcohol use disorder, severe, dependence  - valium 10mg po TID scheduled  - CIWA q4h  - ativan prn CIWA  - sp banana bag x1  - daily thiamine/FA/MV  - seizure precautions  - fall precautions        VTE Risk Mitigation (From admission, onward)         Ordered     IP VTE LOW RISK PATIENT  Once         11/04/23 2118     Place sequential compression device  Until discontinued         11/04/23 2118                Discharge Planning   HI: 11/9/2023     Code Status: Full Code   Is the patient medically ready for discharge?:     Reason for patient still in hospital (select all that apply): Patient trending condition  Discharge Plan A: Other (IP substance use rehab)                  Shakira Fajardo MD  Department of Hospital Medicine   [facility] - [facility]

## 2023-11-07 NOTE — CONSULTS
[facility] - [facility]  Palliative Medicine  Consult Note    Patient Name: Alberto Song  MRN: [id number]  Admission Date: 11/4/2023  Hospital Length of Stay: 3 days  Code Status: Full Code   Attending Provider: Shakira Fajardo MD  Consulting Provider: Mary Zaldivar MD  Primary Care Physician: Paolo Catalan MD  Principal Problem:Alcoholic liver disease    Patient information was obtained from patient, parent and primary team.      Inpatient consult to Palliative Care  Consult performed by: Mary Zaldivar MD  Consult ordered by: Shakira Fajardo MD        Assessment/Plan:     Palliative Care  Palliative care encounter  48 year old male with alcohol use disorder admitted with alcohol hepatitis. Palliative consulted to discuss end of life and hospice.     Alcohol use disorder/alcohol hepatitis/pancytopenia/DMII  -management per primary and other consultants     Code status: Full     Surrogate decision maker: Father is NOK     GOC/ACP  -Met with patient and father at bedside. Introduced palliative medicine. Discussed differences between palliative medicine and hospice. Discussed expected trajectory if patient continues to drink. Patient says he is going to stop drinking. Encouraged him to utilize resources per addiction psychiatry. Patient perceives having a good quality of life. He is not ready to stop coming to the hospital. He does not want to focus on comfort. Denies having any symptoms   Engaged patient is conversation regarding what if's including code status. Patient says he will need to think more about these things. Patient's father thinks patient would benefit from continuing these conversations once patient is over his acute illness. Discussed continuing to follow in clinic     -Patient's goal is to stop drinking and get better   -Recommend continued addiction psychiatry involvement   -Patient can follow-up in palliative medicine clinic for continued GOC conversations     Discussed with  "Dr. Fajardo         Thank you for your consult. I will follow-up with patient. Please contact us if you have any additional questions.    Subjective:     HPI:   Per hospital medicine H&P  "49 Y/O diabetic, cigarette smoker, and daily ETOH uses/abuser M presents via EMS reporting generalized weakness, fall with closed head injury yesterday and difficulty ambulating secondary to gait instability.  No reported chest/back/abdominal pain.  He reports several months of worsening sensory loss to bilateral feet. Also reports increased alcohol use over last three months as well with associated decreased appetite po intake and as much as 100lb weight loss in the last three months. Reports drinking 1L of hard liqour daily. Triage reported questionable seizure activity, however he denies any falls or new injuries as use been on the sofa since he returned from hospital yesterday given his feeling of generalized lightheadedness and generalized weakness.  No headache, visual changes, ipsilateral numbness or weakness, vomiting/nausea or reported recent illness.     In the ED patient afebrile and hemodynamically stable saturating well on room air at rest. Patient alert and oriented and answering questions. Serum alcohol 205 on presentation though despite patient appears to be in early withdrawals. LFTs elevated AST>>>ALT. TB 6.5. INR 1.3. Alb 2.1, Ca 6.5, Mg 1.4. LA initially 6.8 but trended down to 4.6 after IVF. Initial imaging studies without acute process. Abdomen imaging pending. Patient started on aggressive IVFs and admitted to the care of medicine for further evaluation and management. "      Palliative consulted for end of life, hospice discussion. Patient denies pain, shortness of breath, nausea.       Hospital Course:  No notes on file        Past Medical History:   Diagnosis Date    Asthma     Diabetes mellitus     Hyperlipidemia     Hypertension     Sleep apnea, unspecified     Type 2 diabetes mellitus without " complication, without long-term current use of insulin 6/6/2023       Past Surgical History:   Procedure Laterality Date    LAPAROSCOPIC CHOLECYSTECTOMY N/A 2/8/2023    Procedure: CHOLECYSTECTOMY, LAPAROSCOPIC;  Surgeon: Nilo Tobias MD;  Location: [facility];  Service: General;  Laterality: N/A;       Review of patient's allergies indicates:  No Known Allergies    Medications:  Continuous Infusions:  Scheduled Meds:   ciprofloxacin HCl   Both Eyes TID    diazePAM  2 mg Oral Q12H    folic acid  1 mg Oral Daily    lactulose  30 g Oral TID    magnesium sulfate IVPB  2 g Intravenous Once    multivitamin  1 tablet Oral Daily    nicotine  1 patch Transdermal Daily    rifAXImin  550 mg Oral BID    thiamine (B-1) 500 mg in dextrose 5 % (D5W) 100 mL IVPB  500 mg Intravenous TID     PRN Meds:albuterol **AND** MDI Q6H PRN, aluminum-magnesium hydroxide-simethicone, dextrose 10%, dextrose 10%, glucagon (human recombinant), glucose, glucose, insulin aspart U-100, lorazepam, melatonin, naloxone, ondansetron, oxyCODONE, sodium chloride 0.9%    Family History       Problem Relation (Age of Onset)    Cancer Mother    Early death Mother    Hypertension Father          Tobacco Use    Smoking status: Every Day     Current packs/day: 1.00     Average packs/day: 1 pack/day for 32.0 years (32.0 ttl pk-yrs)     Types: Cigarettes    Smokeless tobacco: Never   Substance and Sexual Activity    Alcohol use: Not Currently     Comment: One 750ml per day    Drug use: Never    Sexual activity: Not Currently     Partners: Female     Birth control/protection: None       Review of Systems   Constitutional:  Positive for activity change and fatigue.   Eyes: Negative.    Respiratory: Negative.     Cardiovascular: Negative.    Gastrointestinal: Negative.    Endocrine: Negative.    Skin:  Positive for color change.   Allergic/Immunologic: Negative.    Neurological:  Positive for weakness.   Psychiatric/Behavioral: Negative.        Objective:     Vital Signs (Most Recent):  Temp: 98.8 °F (37.1 °C) (11/07/23 1143)  Pulse: 106 (11/07/23 1506)  Resp: 18 (11/07/23 1143)  BP: 118/81 (11/07/23 1143)  SpO2: 98 % (11/07/23 1143) Vital Signs (24h Range):  Temp:  [98.4 °F (36.9 °C)-98.8 °F (37.1 °C)] 98.8 °F (37.1 °C)  Pulse:  [] 106  Resp:  [18] 18  SpO2:  [97 %-98 %] 98 %  BP: (113-127)/(77-86) 118/81     Weight: 90.8 kg (200 lb 3.2 oz)  Body mass index is 24.37 kg/m².       Physical Exam  Vitals and nursing note reviewed.   Constitutional:       General: He is not in acute distress.     Appearance: He is ill-appearing.   HENT:      Head: Normocephalic.      Right Ear: External ear normal.      Left Ear: External ear normal.      Nose: Nose normal.      Mouth/Throat:      Mouth: Mucous membranes are moist.   Eyes:      General: Scleral icterus present.      Comments: Ecchymosis around right eye   Cardiovascular:      Rate and Rhythm: Tachycardia present.   Pulmonary:      Effort: Pulmonary effort is normal. No respiratory distress.   Musculoskeletal:         General: Normal range of motion.      Cervical back: Normal range of motion.   Neurological:      Mental Status: He is alert and oriented to person, place, and time.   Psychiatric:         Mood and Affect: Mood normal.            Review of Symptoms      Symptom Assessment (ESAS 0-10 Scale)  Pain:  0  Dyspnea:  0  Anxiety:  0  Nausea:  0  Depression:  0  Anorexia:  0  Fatigue:  0  Insomnia:  0  Restlessness:  0  Agitation:  0     CAM / Delirium:  Negative  Constipation:  Negative  Diarrhea:  Negative      Bowel Management Plan (BMP):  Yes      Modified Karin Scale:  0    Performance Status:  50    Living Arrangements:  Lives with friend    Psychosocial/Cultural:   See Palliative Psychosocial Note: No  Lives with a roommate. Reports being completely independent prior to fall. Father is a retired anaesthesiologist   **Primary  to Follow**  Palliative Care  Consult:  No    Spiritual:  F - Vicky and Belief:  Not addressed         Advance Care Planning  Advance Directives:   Living Will: No    LaPOST: No    Do Not Resuscitate Status: No    Medical Power of : No      Decision Making:  Patient answered questions  Goals of Care: The patient endorses that what is most important right now is to focus on curative/life-prolongation (regardless of treatment burdens)    Accordingly, we have decided that the best plan to meet the patient's goals includes continuing with treatment         Significant Labs: All pertinent labs within the past 24 hours have been reviewed.  CBC:   Recent Labs   Lab 11/07/23 0827   WBC 2.45*   HGB 7.4*   HCT 22.1*   *   PLT 87*     BMP:  Recent Labs   Lab 11/07/23 0827   *   *   K 2.8*      CO2 27   BUN 10   CREATININE 0.5   CALCIUM 7.7*     LFT:  Lab Results   Component Value Date     (H) 11/07/2023    GGT 66 (H) 07/06/2023    ALKPHOS 271 (H) 11/07/2023    BILITOT 10.9 (H) 11/07/2023     Albumin:   Albumin   Date Value Ref Range Status   11/07/2023 2.2 (L) 3.5 - 5.2 g/dL Final     Protein:   Total Protein   Date Value Ref Range Status   11/07/2023 4.5 (L) 6.0 - 8.4 g/dL Final     Lactic acid:   Lab Results   Component Value Date    LACTATE 3.0 (H) 11/05/2023    LACTATE 7.4 (HH) 11/04/2023       Significant Imaging: I have reviewed all pertinent imaging results/findings within the past 24 hours.    US liver with doppler 11/5/23  Impression:     Hepatomegaly with hepatic steatosis.     Cholecystectomy.     Within normal limits liver Doppler evaluation.        I spent a total of 85 minutes on the day of the visit. This includes face to face time in discussion of goals of care, symptom assessment, coordination of care and emotional support.  This also includes non-face to face time preparing to see the patient (eg, review of tests/imaging), obtaining and/or reviewing separately obtained history, documenting clinical  information in the electronic or other health record, independently interpreting results and communicating results to the patient/family/caregiver, or care coordinator.    Mary Zaldivar MD  Palliative Medicine  [facility]

## 2023-11-07 NOTE — PLAN OF CARE
Problem: Physical Therapy  Goal: Physical Therapy Goal  Description: Goals to be met by: 2023     Patient will increase functional independence with mobility by performin. Supine to sit with Set-up [place]  2. Sit to supine with Set-up [place]  3. Sit to stand transfer with Supervision  4. Bed to chair transfer with Supervision using LRAD  5. Gait  x 250 feet with Supervision using LRAD.   6. Ascend/descend 20 stair with left Handrails Stand-by Assistance using No Assistive Device.   7. Lower extremity exercise program x15 reps per handout, with supervision    Outcome: Ongoing, Progressing

## 2023-11-07 NOTE — TREATMENT PLAN
Hepatology Treatment Plan    Alberto Song is a 48 y.o. male admitted to hospital 11/4/2023 (Hospital Day: 4) due to Alcoholic liver disease.     Interval History  LFTs downtrending    Objective  Temp:  [98.4 °F (36.9 °C)-98.8 °F (37.1 °C)] 98.8 °F (37.1 °C) (11/07 1143)  Pulse:  [] 85 (11/07 1143)  BP: (113-127)/(77-86) 118/81 (11/07 1143)  Resp:  [18] 18 (11/07 1143)  SpO2:  [97 %-98 %] 98 % (11/07 1143)      Laboratory    Recent Labs   Lab 11/07/23  0827   WBC 2.45*   RBC 2.10*   HGB 7.4*   HCT 22.1*   PLT 87*   *   MCH 35.2*   MCHC 33.5      Recent Labs   Lab 11/07/23 0827   CALCIUM 7.7*   ALBUMIN 2.2*   PROT 4.5*   *   K 2.8*   CO2 27      BUN 10   CREATININE 0.5   ALKPHOS 271*   ALT 61*   *   BILITOT 10.9*      Recent Labs   Lab 11/07/23  0543   INR 1.3*        MELD 3.0: 22 at 11/7/2023  8:27 AM  MELD-Na: 19 at 11/7/2023  8:27 AM  Calculated from:  Serum Creatinine: 0.5 mg/dL (Using min of 1 mg/dL) at 11/7/2023  8:27 AM  Serum Sodium: 135 mmol/L at 11/7/2023  8:27 AM  Total Bilirubin: 10.9 mg/dL at 11/7/2023  8:27 AM  Serum Albumin: 2.2 g/dL at 11/7/2023  8:27 AM  INR(ratio): 1.3 at 11/7/2023  5:43 AM  Age at listing (hypothetical): 48 years  Sex: Male at 11/7/2023  8:27 AM     AFP 2.2, wnl      Assessment and Plan    Alberto Song is a 48 y.o. male with history of alcohol use disorder who presents for generalized weakness, weight loss, recent fall. Hepatology consulted for elevated LFTs. Suspect chronic liver disease with superimposed alcoholic hepatitis based on history and pattern of LFT elevations. Likely underlying cirrhosis. Will obtain serologic workup to rule out secondary causes. MELD 23. Has potential to improve with abstinence from alcohol over next several months. Appreciate addiction psych assistance.      Problem List:  Chronic alcoholic liver disease  Alcohol use disorder     Recommendations:  - Withdrawal treatment per addiction psych  - Discussed need for  abstinence with patient. Noted he declined rehab after discharge when discussing with addiction psych. Outpatient therapist was recommended.  - Serologic workup ordered, negative to date  - PETH pending  - Lactulose titrated to 2-3 BMs daily + rifaximin  - Will arrange for outpatient hepatology clinic follow-up      - We are signing-off. Please call with any questions.    Thank you for involving us in the care of Alberto Song. Please call with any additional questions, concerns or changes in the patient's clinical status.    Vimal Tyler MD  Gastroenterology and Hepatology Fellow, PGY V

## 2023-11-07 NOTE — SUBJECTIVE & OBJECTIVE
Interval History:   11/7: patient much more awake today. Working with PT this morning. We discussed the severity of his alcohol abuse and that his medical issues including malnutrition and weakness can be directly linked back to alcohol intake. Recommending inpatient treatment for alcohol but patient does not appear interested. Overall prognosis grim without commitment to sustained sobriety.     Review of Systems   Constitutional:  Positive for activity change, appetite change, fatigue and unexpected weight change. Negative for chills, diaphoresis and fever.   HENT:  Negative for sore throat and trouble swallowing.    Eyes:  Negative for photophobia and visual disturbance.   Respiratory:  Negative for cough, shortness of breath and wheezing.    Cardiovascular:  Negative for chest pain, palpitations and leg swelling.   Gastrointestinal:  Negative for abdominal distention, abdominal pain, diarrhea, nausea and vomiting.   Genitourinary:  Negative for dysuria and hematuria.   Musculoskeletal:  Positive for gait problem. Negative for myalgias, neck pain and neck stiffness.   Skin:  Negative for color change and rash.   Neurological:  Positive for weakness, numbness and headaches. Negative for seizures, syncope and light-headedness.   Psychiatric/Behavioral:  Negative for confusion and decreased concentration.      Objective:     Vital Signs (Most Recent):  Temp: 98.8 °F (37.1 °C) (11/07/23 1143)  Pulse: 85 (11/07/23 1143)  Resp: 18 (11/07/23 1143)  BP: 118/81 (11/07/23 1143)  SpO2: 98 % (11/07/23 1143) Vital Signs (24h Range):  Temp:  [98.4 °F (36.9 °C)-98.8 °F (37.1 °C)] 98.8 °F (37.1 °C)  Pulse:  [] 85  Resp:  [18] 18  SpO2:  [97 %-98 %] 98 %  BP: (113-127)/(77-86) 118/81     Weight: 90.8 kg (200 lb 3.2 oz)  Body mass index is 24.37 kg/m².    Intake/Output Summary (Last 24 hours) at 11/7/2023 8344  Last data filed at 11/7/2023 0408  Gross per 24 hour   Intake --   Output 1200 ml   Net -1200 ml         Physical  Exam  Constitutional:       General: He is not in acute distress.     Appearance: He is not toxic-appearing or diaphoretic.   HENT:      Head: Normocephalic.      Comments: Hematoma from fall     Nose: Nose normal.   Eyes:      General: Scleral icterus present.      Extraocular Movements: Extraocular movements intact.      Pupils: Pupils are equal, round, and reactive to light.   Cardiovascular:      Rate and Rhythm: Regular rhythm. Tachycardia present.   Pulmonary:      Effort: Pulmonary effort is normal. No respiratory distress.      Breath sounds: No wheezing or rales.   Abdominal:      General: Abdomen is flat. There is no distension.      Palpations: Abdomen is soft.      Tenderness: There is no abdominal tenderness. There is no guarding.      Comments: Significant hepatomegaly   Musculoskeletal:         General: Normal range of motion.      Cervical back: Normal range of motion and neck supple. No rigidity.      Right lower leg: No edema.      Left lower leg: No edema.   Skin:     General: Skin is warm and dry.      Coloration: Skin is not jaundiced.   Neurological:      General: No focal deficit present.      Mental Status: He is alert and oriented to person, place, and time.      Cranial Nerves: No cranial nerve deficit.   Psychiatric:         Mood and Affect: Mood normal.         Behavior: Behavior normal.             Significant Labs: All pertinent labs within the past 24 hours have been reviewed.    Significant Imaging: I have reviewed all pertinent imaging results/findings within the past 24 hours.

## 2023-11-07 NOTE — HPI
"Per hospital medicine H&P  "47 Y/O diabetic, cigarette smoker, and daily ETOH uses/abuser M presents via EMS reporting generalized weakness, fall with closed head injury yesterday and difficulty ambulating secondary to gait instability.  No reported chest/back/abdominal pain.  He reports several months of worsening sensory loss to bilateral feet. Also reports increased alcohol use over last three months as well with associated decreased appetite po intake and as much as 100lb weight loss in the last three months. Reports drinking 1L of hard liqour daily. Triage reported questionable seizure activity, however he denies any falls or new injuries as use been on the sofa since he returned from hospital yesterday given his feeling of generalized lightheadedness and generalized weakness.  No headache, visual changes, ipsilateral numbness or weakness, vomiting/nausea or reported recent illness.     In the ED patient afebrile and hemodynamically stable saturating well on room air at rest. Patient alert and oriented and answering questions. Serum alcohol 205 on presentation though despite patient appears to be in early withdrawals. LFTs elevated AST>>>ALT. TB 6.5. INR 1.3. Alb 2.1, Ca 6.5, Mg 1.4. LA initially 6.8 but trended down to 4.6 after IVF. Initial imaging studies without acute process. Abdomen imaging pending. Patient started on aggressive IVFs and admitted to the care of medicine for further evaluation and management. "      Palliative consulted for end of life, hospice discussion. Patient denies pain, shortness of breath, nausea.   "

## 2023-11-07 NOTE — NURSING
Patient HR increase to 130s with activity. Short from of SVT reported by telemetry. Patient asymptomatic. MD notified. New orders noted.

## 2023-11-07 NOTE — PT/OT/SLP PROGRESS
"Occupational Therapy   Treatment    Name: Alberto Song  MRN: [id number]  Admitting Diagnosis:  Alcoholic liver disease       Recommendations:     Discharge Recommendations: High Intensity Therapy  Discharge Equipment Recommendations:  walker, rolling  Barriers to discharge:  Decreased caregiver support    Assessment:     Alberto Song is a 48 y.o. male with a medical diagnosis of Alcoholic liver disease.  He presents with improvements in orientation and memory at this date. Performance deficits affecting function are weakness, impaired endurance, impaired self care skills, impaired functional mobility, impaired balance, impaired skin. Pt agreeable to therapy and tolerated well. Pt remains limited in ADLs, functional mobility, and functional transfers. Pt would continue to benefit from high intensity skilled OT services to maximize safety and (I) in functional mobility and self-care skills.      Rehab Prognosis:  Good; patient would benefit from acute skilled OT services to address these deficits and reach maximum level of function.       Plan:     Patient to be seen 1 x/week to address the above listed problems via self-care/home management, therapeutic activities, therapeutic exercises, neuromuscular re-education  Plan of Care Expires: 12/06/23  Plan of Care Reviewed with: patient    Subjective     Chief Complaint: BlLE weakness  Patient/Family Comments/goals: "I need to get better"   Pain/Comfort:  Pain Rating 1: 0/10  Pain Rating Post-Intervention 1: 0/10    Objective:     Communicated with: RN prior to session.  Patient found HOB elevated with winston catheter, bed alarm upon OT entry to room.    General Precautions: Standard, fall, diabetic, seizure    Orthopedic Precautions:N/A  Braces: N/A  Respiratory Status: Room air     Occupational Performance:     Bed Mobility:    Patient completed Scooting/Bridging with supervision  Patient completed Supine to Sit with minimum assistance HOB elevated      Functional " Mobility/Transfers:  Patient completed Sit <> Stand Transfer with contact guard assistance  with  rolling walker   Patient completed Bed <> Chair Transfer using Stand Pivot technique with contact guard assistance with rolling walker  Functional Mobility: Pt sat EOB to perform ADLs and noted with good sitting tolerance and static/dynamic sitting balance    Activities of Daily Living:  Lower Body Dressing: supervision Pt donned socks sitting EOB. Pt required increased time 2/2 fine motor impairments and B hand tremors   Toileting: total assistance Gomez catheter     Therapeutic Exercise  OT provided pt with BUE HEP with exercises such as bicep curls, tricep extension, shoulder flexion, shoulder external rotation, and shoulder horizontal abduction. OT educated pt on the importance of performing exercises 10x ea 2-3x a day to improve UE strength and overall endurance required for functional task performance.  Pt demonstrated good understanding by performing each therex 10x. Pt required Min A when performing LUE shoulder flexion, but performed the rest of therex SPV      [facility] 6 Click ADL: 20    Treatment & Education:  -Education on energy conservation and task modification to maximize safety and (I) during ADLs and mobility  -Education on importance of OOB activity to improve overall activity tolerance and promote recovery  -Pt educated to call for assistance and to transfer with hospital staff only  -Provided education regarding role of OT, POC, & discharge recommendations with pt verbalizing understanding.  Pt had no further questions & when asked whether there were any concerns pt reported none.      Patient left up in chair with all lines intact, call button in reach, RN notified, and MD present    GOALS:   Multidisciplinary Problems       Occupational Therapy Goals          Problem: Occupational Therapy    Goal Priority Disciplines Outcome Interventions   Occupational Therapy Goal     OT, PT/OT Ongoing, Progressing     Description: Goals to be met by: 11-16-23     Patient will increase functional independence with ADLs by performing:    UE Dressing with Supervision.  LE Dressing with Supervision.  Grooming while standing at sink with Supervision.  Toileting from toilet with Supervision for hygiene and clothing management.   Supine to sit with [place].  Stand pivot transfers with Supervision.  Toilet transfer to toilet with Supervision.  Pt. To be I with HEP to BUE to improve level of endurance                         Time Tracking:     OT Date of Treatment: 11/07/23  OT Start Time: 0905  OT Stop Time: 0930  OT Total Time (min): 25 min    Billable Minutes:Self Care/Home Management 10 min  Therapeutic Activity 15 min     OT/DERRICK: OT          11/7/2023

## 2023-11-07 NOTE — TREATMENT PLAN
Hepatology Treatment Plan    Alberto Song is a 48 y.o. male admitted to hospital 11/4/2023 (Hospital Day: 4) due to Alcoholic liver disease.     Interval History  LFTs downtrending    Objective  Temp:  [98.4 °F (36.9 °C)-98.8 °F (37.1 °C)] 98.8 °F (37.1 °C) (11/07 1143)  Pulse:  [] 85 (11/07 1143)  BP: (113-127)/(77-86) 118/81 (11/07 1143)  Resp:  [18] 18 (11/07 1143)  SpO2:  [97 %-98 %] 98 % (11/07 1143)      Laboratory    Recent Labs   Lab 11/07/23  0827   WBC 2.45*   RBC 2.10*   HGB 7.4*   HCT 22.1*   PLT 87*   *   MCH 35.2*   MCHC 33.5        Recent Labs   Lab 11/07/23 0827   CALCIUM 7.7*   ALBUMIN 2.2*   PROT 4.5*   *   K 2.8*   CO2 27      BUN 10   CREATININE 0.5   ALKPHOS 271*   ALT 61*   *   BILITOT 10.9*        Recent Labs   Lab 11/07/23  0543   INR 1.3*          MELD 3.0: 22 at 11/7/2023  8:27 AM  MELD-Na: 19 at 11/7/2023  8:27 AM  Calculated from:  Serum Creatinine: 0.5 mg/dL (Using min of 1 mg/dL) at 11/7/2023  8:27 AM  Serum Sodium: 135 mmol/L at 11/7/2023  8:27 AM  Total Bilirubin: 10.9 mg/dL at 11/7/2023  8:27 AM  Serum Albumin: 2.2 g/dL at 11/7/2023  8:27 AM  INR(ratio): 1.3 at 11/7/2023  5:43 AM  Age at listing (hypothetical): 48 years  Sex: Male at 11/7/2023  8:27 AM     AFP 2.2, wnl      Assessment and Plan    Alberto Song is a 48 y.o. male with history of alcohol use disorder who presents for generalized weakness, weight loss, recent fall. Hepatology consulted for elevated LFTs. Suspect chronic liver disease with superimposed alcoholic hepatitis based on history and pattern of LFT elevations. Likely underlying cirrhosis. Will obtain serologic workup to rule out secondary causes. MELD 23. Has potential to improve with abstinence from alcohol over next several months. Appreciate addiction psych assistance.      Problem List:  Chronic alcoholic liver disease  Alcohol use disorder     Recommendations:  - Withdrawal treatment per addiction psych  - Discussed need for  abstinence with patient. Noted he declined rehab after discharge when discussing with addiction psych. Outpatient therapist was recommended.  - Consult palliative care. Not a transplant due to ongoing alcohol use.  - Serologic workup ordered, negative to date  - PETH pending  - Lactulose titrated to 2-3 BMs daily + rifaximin  - Will arrange for outpatient hepatology clinic follow-up      - We are signing-off. Please call with any questions.    Thank you for involving us in the care of Alberto Song. Please call with any additional questions, concerns or changes in the patient's clinical status.    Vimal Tyler MD  Gastroenterology and Hepatology Fellow, PGY V

## 2023-11-07 NOTE — SUBJECTIVE & OBJECTIVE
Past Medical History:   Diagnosis Date    Asthma     Diabetes mellitus     Hyperlipidemia     Hypertension     Sleep apnea, unspecified     Type 2 diabetes mellitus without complication, without long-term current use of insulin 6/6/2023       Past Surgical History:   Procedure Laterality Date    LAPAROSCOPIC CHOLECYSTECTOMY N/A 2/8/2023    Procedure: CHOLECYSTECTOMY, LAPAROSCOPIC;  Surgeon: Nilo Tobias MD;  Location: [facility];  Service: General;  Laterality: N/A;       Review of patient's allergies indicates:  No Known Allergies    Medications:  Continuous Infusions:  Scheduled Meds:   ciprofloxacin HCl   Both Eyes TID    diazePAM  2 mg Oral Q12H    folic acid  1 mg Oral Daily    lactulose  30 g Oral TID    magnesium sulfate IVPB  2 g Intravenous Once    multivitamin  1 tablet Oral Daily    nicotine  1 patch Transdermal Daily    rifAXImin  550 mg Oral BID    thiamine (B-1) 500 mg in dextrose 5 % (D5W) 100 mL IVPB  500 mg Intravenous TID     PRN Meds:albuterol **AND** MDI Q6H PRN, aluminum-magnesium hydroxide-simethicone, dextrose 10%, dextrose 10%, glucagon (human recombinant), glucose, glucose, insulin aspart U-100, lorazepam, melatonin, naloxone, ondansetron, oxyCODONE, sodium chloride 0.9%    Family History       Problem Relation (Age of Onset)    Cancer Mother    Early death Mother    Hypertension Father          Tobacco Use    Smoking status: Every Day     Current packs/day: 1.00     Average packs/day: 1 pack/day for 32.0 years (32.0 ttl pk-yrs)     Types: Cigarettes    Smokeless tobacco: Never   Substance and Sexual Activity    Alcohol use: Not Currently     Comment: One 750ml per day    Drug use: Never    Sexual activity: Not Currently     Partners: Female     Birth control/protection: None       Review of Systems   Constitutional:  Positive for activity change and fatigue.   Eyes: Negative.    Respiratory: Negative.     Cardiovascular: Negative.    Gastrointestinal: Negative.    Endocrine:  Negative.    Skin:  Positive for color change.   Allergic/Immunologic: Negative.    Neurological:  Positive for weakness.   Psychiatric/Behavioral: Negative.       Objective:     Vital Signs (Most Recent):  Temp: 98.8 °F (37.1 °C) (11/07/23 1143)  Pulse: 106 (11/07/23 1506)  Resp: 18 (11/07/23 1143)  BP: 118/81 (11/07/23 1143)  SpO2: 98 % (11/07/23 1143) Vital Signs (24h Range):  Temp:  [98.4 °F (36.9 °C)-98.8 °F (37.1 °C)] 98.8 °F (37.1 °C)  Pulse:  [] 106  Resp:  [18] 18  SpO2:  [97 %-98 %] 98 %  BP: (113-127)/(77-86) 118/81     Weight: 90.8 kg (200 lb 3.2 oz)  Body mass index is 24.37 kg/m².       Physical Exam  Vitals and nursing note reviewed.   Constitutional:       General: He is not in acute distress.     Appearance: He is ill-appearing.   HENT:      Head: Normocephalic.      Right Ear: External ear normal.      Left Ear: External ear normal.      Nose: Nose normal.      Mouth/Throat:      Mouth: Mucous membranes are moist.   Eyes:      General: Scleral icterus present.      Comments: Ecchymosis around right eye   Cardiovascular:      Rate and Rhythm: Tachycardia present.   Pulmonary:      Effort: Pulmonary effort is normal. No respiratory distress.   Musculoskeletal:         General: Normal range of motion.      Cervical back: Normal range of motion.   Neurological:      Mental Status: He is alert and oriented to person, place, and time.   Psychiatric:         Mood and Affect: Mood normal.            Review of Symptoms      Symptom Assessment (ESAS 0-10 Scale)  Pain:  0  Dyspnea:  0  Anxiety:  0  Nausea:  0  Depression:  0  Anorexia:  0  Fatigue:  0  Insomnia:  0  Restlessness:  0  Agitation:  0     CAM / Delirium:  Negative  Constipation:  Negative  Diarrhea:  Negative      Bowel Management Plan (BMP):  Yes      Modified Karin Scale:  0    Performance Status:  50    Living Arrangements:  Lives with friend    Psychosocial/Cultural:   See Palliative Psychosocial Note: No  Lives with a roommate. Reports  being completely independent prior to fall. Father is a retired anaesthesiologist   **Primary  to Follow**  Palliative Care  Consult: No    Spiritual:  F - Vicky and Belief:  Not addressed         Advance Care Planning   Advance Directives:   Living Will: No    LaPOST: No    Do Not Resuscitate Status: No    Medical Power of : No      Decision Making:  Patient answered questions  Goals of Care: The patient endorses that what is most important right now is to focus on curative/life-prolongation (regardless of treatment burdens)    Accordingly, we have decided that the best plan to meet the patient's goals includes continuing with treatment         Significant Labs: All pertinent labs within the past 24 hours have been reviewed.  CBC:   Recent Labs   Lab 11/07/23 0827   WBC 2.45*   HGB 7.4*   HCT 22.1*   *   PLT 87*     BMP:  Recent Labs   Lab 11/07/23 0827   *   *   K 2.8*      CO2 27   BUN 10   CREATININE 0.5   CALCIUM 7.7*     LFT:  Lab Results   Component Value Date     (H) 11/07/2023    GGT 66 (H) 07/06/2023    ALKPHOS 271 (H) 11/07/2023    BILITOT 10.9 (H) 11/07/2023     Albumin:   Albumin   Date Value Ref Range Status   11/07/2023 2.2 (L) 3.5 - 5.2 g/dL Final     Protein:   Total Protein   Date Value Ref Range Status   11/07/2023 4.5 (L) 6.0 - 8.4 g/dL Final     Lactic acid:   Lab Results   Component Value Date    LACTATE 3.0 (H) 11/05/2023    LACTATE 7.4 (HH) 11/04/2023       Significant Imaging: I have reviewed all pertinent imaging results/findings within the past 24 hours.    US liver with doppler 11/5/23  Impression:     Hepatomegaly with hepatic steatosis.     Cholecystectomy.     Within normal limits liver Doppler evaluation.

## 2023-11-07 NOTE — PT/OT/SLP EVAL
Physical Therapy Evaluation    Patient Name:  Alberto Song   MRN:  [id number]    Recommendations:     Discharge Recommendations: High Intensity Therapy   Discharge Equipment Recommendations: to be determined by next level of care   Barriers to discharge: Decreased caregiver support    Assessment:     Alberto Song is a 48 y.o. male admitted with a medical diagnosis of Alcoholic liver disease.  He presents with the following impairments/functional limitations: weakness, impaired endurance, impaired self care skills, impaired functional mobility, gait instability, impaired balance Pt. cooperative and tolerated treatment well. Pt. progressing with mobility.    Rehab Prognosis: Good; patient would benefit from acute skilled PT services to address these deficits and reach maximum level of function.    Recent Surgery: * No surgery found *      Plan:     During this hospitalization, patient to be seen 4 x/week to address the identified rehab impairments via gait training, therapeutic activities, therapeutic exercises and progress toward the following goals:    Plan of Care Expires:       Subjective     Chief Complaint: weakness  Patient/Family Comments/goals: pt. Agreeable to PT  Pain/Comfort:  Pain Rating 1: 0/10  Pain Rating Post-Intervention 1: 0/10    Patients cultural, spiritual, Episcopalian conflicts given the current situation: no    Living Environment:  Pt. Lives alone in multi-level home with ~20 SALLY and (L) handrail  Prior to admission, patients level of function was indep.  Equipment used at home: CPAP, shower chair.  Upon discharge, patient may need to have assistance.    Objective:     Communicated with nursing prior to session.  Patient found supine with winston catheter  upon PT entry to room.    General Precautions: Standard, fall  Orthopedic Precautions:N/A   Braces: N/A  Respiratory Status: Room air    Exams:  RLE ROM: WFL  RLE Strength: WFL  LLE ROM: WFL  LLE Strength: WFL    Functional Mobility:  Bed  Mobility:     Rolling Right: stand by assistance  Scooting: stand by assistance  Supine to Sit: stand by assistance  Sit to Supine: minimum assistance  Transfers:     Sit to Stand:  contact guard assistance with hand-held assist  Gait: 90' with HHA-CGA with decreased step length/jaime and VCs for safety  Balance: fair+      AM-PAC 6 CLICK MOBILITY  Total Score:19       Treatment & Education:  Discussed therapy needs, goals, and POC.    Patient left supine with all lines intact and call button in reach.    GOALS:   Multidisciplinary Problems       Physical Therapy Goals          Problem: Physical Therapy    Goal Priority Disciplines Outcome Goal Variances Interventions   Physical Therapy Goal     PT, PT/OT Ongoing, Progressing     Description: Goals to be met by: 2023     Patient will increase functional independence with mobility by performin. Supine to sit with Set-up Hendricks  2. Sit to supine with Set-up [place]ricks  3. Sit to stand transfer with Supervision  4. Bed to chair transfer with Supervision using LRAD  5. Gait  x 250 feet with Supervision using LRAD.   6. Ascend/descend 20 stair with left Handrails Stand-by Assistance using No Assistive Device.   7. Lower extremity exercise program x15 reps per handout, with supervision                         History:     Past Medical History:   Diagnosis Date    Asthma     Diabetes mellitus     Hyperlipidemia     Hypertension     Sleep apnea, unspecified     Type 2 diabetes mellitus without complication, without long-term current use of insulin 2023       Past Surgical History:   Procedure Laterality Date    LAPAROSCOPIC CHOLECYSTECTOMY N/A 2023    Procedure: CHOLECYSTECTOMY, LAPAROSCOPIC;  Surgeon: Nilo Tobias MD;  Location: [facility];  Service: General;  Laterality: N/A;       Time Tracking:     PT Received On: 23  PT Start Time: 1354     PT Stop Time: 1412  PT Total Time (min): 18 min     Billable Minutes: Evaluation 10  and Gait Training 8      11/07/2023

## 2023-11-08 LAB
ALBUMIN SERPL BCP-MCNC: 2.1 G/DL (ref 3.5–5.2)
ALP SERPL-CCNC: 266 U/L (ref 55–135)
ALT SERPL W/O P-5'-P-CCNC: 60 U/L (ref 10–44)
ANA SER QL IF: NORMAL
ANION GAP SERPL CALC-SCNC: 9 MMOL/L (ref 8–16)
AST SERPL-CCNC: 186 U/L (ref 10–40)
BASOPHILS # BLD AUTO: 0.04 K/UL (ref 0–0.2)
BASOPHILS NFR BLD: 1.3 % (ref 0–1.9)
BILIRUB SERPL-MCNC: 11.1 MG/DL (ref 0.1–1)
BUN SERPL-MCNC: 10 MG/DL (ref 6–20)
CALCIUM SERPL-MCNC: 7.7 MG/DL (ref 8.7–10.5)
CHLORIDE SERPL-SCNC: 97 MMOL/L (ref 95–110)
CO2 SERPL-SCNC: 26 MMOL/L (ref 23–29)
CREAT SERPL-MCNC: 0.6 MG/DL (ref 0.5–1.4)
DIFFERENTIAL METHOD: ABNORMAL
EOSINOPHIL # BLD AUTO: 0.1 K/UL (ref 0–0.5)
EOSINOPHIL NFR BLD: 2.3 % (ref 0–8)
ERYTHROCYTE [DISTWIDTH] IN BLOOD BY AUTOMATED COUNT: 15.9 % (ref 11.5–14.5)
EST. GFR  (NO RACE VARIABLE): >60 ML/MIN/1.73 M^2
GLUCOSE SERPL-MCNC: 175 MG/DL (ref 70–110)
HCT VFR BLD AUTO: 23.3 % (ref 40–54)
HGB BLD-MCNC: 7.3 G/DL (ref 14–18)
IMM GRANULOCYTES # BLD AUTO: 0.1 K/UL (ref 0–0.04)
IMM GRANULOCYTES NFR BLD AUTO: 3.3 % (ref 0–0.5)
INR PPP: 1.3 (ref 0.8–1.2)
LYMPHOCYTES # BLD AUTO: 0.9 K/UL (ref 1–4.8)
LYMPHOCYTES NFR BLD: 30.3 % (ref 18–48)
MAGNESIUM SERPL-MCNC: 1.6 MG/DL (ref 1.6–2.6)
MAGNESIUM SERPL-MCNC: 1.6 MG/DL (ref 1.6–2.6)
MCH RBC QN AUTO: 34.4 PG (ref 27–31)
MCHC RBC AUTO-ENTMCNC: 31.3 G/DL (ref 32–36)
MCV RBC AUTO: 110 FL (ref 82–98)
METHANOL SERPL-MCNC: <5 MG/DL
MONOCYTES # BLD AUTO: 0.4 K/UL (ref 0.3–1)
MONOCYTES NFR BLD: 13.3 % (ref 4–15)
NEUTROPHILS # BLD AUTO: 1.5 K/UL (ref 1.8–7.7)
NEUTROPHILS NFR BLD: 49.5 % (ref 38–73)
NRBC BLD-RTO: 3 /100 WBC
PLATELET # BLD AUTO: 108 K/UL (ref 150–450)
PMV BLD AUTO: 10.8 FL (ref 9.2–12.9)
POCT GLUCOSE: 224 MG/DL (ref 70–110)
POCT GLUCOSE: 304 MG/DL (ref 70–110)
POTASSIUM SERPL-SCNC: 3.5 MMOL/L (ref 3.5–5.1)
PROT SERPL-MCNC: 4.6 G/DL (ref 6–8.4)
PROTHROMBIN TIME: 13.3 SEC (ref 9–12.5)
RBC # BLD AUTO: 2.12 M/UL (ref 4.6–6.2)
SODIUM SERPL-SCNC: 132 MMOL/L (ref 136–145)
WBC # BLD AUTO: 3 K/UL (ref 3.9–12.7)

## 2023-11-08 PROCEDURE — 25000003 PHARM REV CODE 250: Performed by: HOSPITALIST

## 2023-11-08 PROCEDURE — 97116 GAIT TRAINING THERAPY: CPT

## 2023-11-08 PROCEDURE — 99233 PR SUBSEQUENT HOSPITAL CARE,LEVL III: ICD-10-PCS | Mod: ,,, | Performed by: HOSPITALIST

## 2023-11-08 PROCEDURE — 85025 COMPLETE CBC W/AUTO DIFF WBC: CPT | Performed by: HOSPITALIST

## 2023-11-08 PROCEDURE — 99900035 HC TECH TIME PER 15 MIN (STAT)

## 2023-11-08 PROCEDURE — 63600175 PHARM REV CODE 636 W HCPCS: Performed by: HOSPITALIST

## 2023-11-08 PROCEDURE — 36415 COLL VENOUS BLD VENIPUNCTURE: CPT | Performed by: FAMILY MEDICINE

## 2023-11-08 PROCEDURE — 20600001 HC STEP DOWN PRIVATE ROOM

## 2023-11-08 PROCEDURE — 97110 THERAPEUTIC EXERCISES: CPT

## 2023-11-08 PROCEDURE — 85610 PROTHROMBIN TIME: CPT | Performed by: FAMILY MEDICINE

## 2023-11-08 PROCEDURE — 99233 SBSQ HOSP IP/OBS HIGH 50: CPT | Mod: ,,, | Performed by: HOSPITALIST

## 2023-11-08 PROCEDURE — 25000003 PHARM REV CODE 250: Performed by: STUDENT IN AN ORGANIZED HEALTH CARE EDUCATION/TRAINING PROGRAM

## 2023-11-08 PROCEDURE — 80053 COMPREHEN METABOLIC PANEL: CPT | Performed by: HOSPITALIST

## 2023-11-08 PROCEDURE — 25000003 PHARM REV CODE 250: Performed by: FAMILY MEDICINE

## 2023-11-08 PROCEDURE — 94660 CPAP INITIATION&MGMT: CPT

## 2023-11-08 PROCEDURE — 83735 ASSAY OF MAGNESIUM: CPT | Performed by: HOSPITALIST

## 2023-11-08 PROCEDURE — 97530 THERAPEUTIC ACTIVITIES: CPT

## 2023-11-08 PROCEDURE — S4991 NICOTINE PATCH NONLEGEND: HCPCS | Performed by: STUDENT IN AN ORGANIZED HEALTH CARE EDUCATION/TRAINING PROGRAM

## 2023-11-08 RX ORDER — DIAZEPAM 2 MG/1
2 TABLET ORAL EVERY 12 HOURS
Status: COMPLETED | OUTPATIENT
Start: 2023-11-08 | End: 2023-11-08

## 2023-11-08 RX ADMIN — THERA TABS 1 TABLET: TAB at 10:11

## 2023-11-08 RX ADMIN — CIPROFLOXACIN HYDROCHLORIDE: 3 OINTMENT OPHTHALMIC at 04:11

## 2023-11-08 RX ADMIN — THIAMINE HYDROCHLORIDE 500 MG: 100 INJECTION, SOLUTION INTRAMUSCULAR; INTRAVENOUS at 09:11

## 2023-11-08 RX ADMIN — OXYCODONE HYDROCHLORIDE 5 MG: 5 TABLET ORAL at 10:11

## 2023-11-08 RX ADMIN — CIPROFLOXACIN HYDROCHLORIDE: 3 OINTMENT OPHTHALMIC at 09:11

## 2023-11-08 RX ADMIN — DIAZEPAM 2 MG: 2 TABLET ORAL at 10:11

## 2023-11-08 RX ADMIN — LACTULOSE 30 G: 20 SOLUTION ORAL at 04:11

## 2023-11-08 RX ADMIN — ALUMINUM HYDROXIDE, MAGNESIUM HYDROXIDE, AND SIMETHICONE 30 ML: 200; 200; 20 SUSPENSION ORAL at 02:11

## 2023-11-08 RX ADMIN — RIFAXIMIN 550 MG: 550 TABLET ORAL at 09:11

## 2023-11-08 RX ADMIN — ALUMINUM HYDROXIDE, MAGNESIUM HYDROXIDE, AND SIMETHICONE 30 ML: 200; 200; 20 SUSPENSION ORAL at 09:11

## 2023-11-08 RX ADMIN — INSULIN ASPART 2 UNITS: 100 INJECTION, SOLUTION INTRAVENOUS; SUBCUTANEOUS at 10:11

## 2023-11-08 RX ADMIN — THIAMINE HYDROCHLORIDE 500 MG: 100 INJECTION, SOLUTION INTRAMUSCULAR; INTRAVENOUS at 04:11

## 2023-11-08 RX ADMIN — CIPROFLOXACIN HYDROCHLORIDE: 3 OINTMENT OPHTHALMIC at 10:11

## 2023-11-08 RX ADMIN — THIAMINE HYDROCHLORIDE 500 MG: 100 INJECTION, SOLUTION INTRAMUSCULAR; INTRAVENOUS at 10:11

## 2023-11-08 RX ADMIN — OXYCODONE HYDROCHLORIDE 5 MG: 5 TABLET ORAL at 04:11

## 2023-11-08 RX ADMIN — FOLIC ACID 1 MG: 1 TABLET ORAL at 10:11

## 2023-11-08 RX ADMIN — DIAZEPAM 2 MG: 2 TABLET ORAL at 09:11

## 2023-11-08 RX ADMIN — ALUMINUM HYDROXIDE, MAGNESIUM HYDROXIDE, AND SIMETHICONE 30 ML: 200; 200; 20 SUSPENSION ORAL at 04:11

## 2023-11-08 RX ADMIN — RIFAXIMIN 550 MG: 550 TABLET ORAL at 10:11

## 2023-11-08 RX ADMIN — Medication 1 PATCH: at 10:11

## 2023-11-08 RX ADMIN — LACTULOSE 30 G: 20 SOLUTION ORAL at 10:11

## 2023-11-08 NOTE — PLAN OF CARE
Problem: Adult Inpatient Plan of Care  Goal: Plan of Care Review  Outcome: Ongoing, Progressing  Goal: Patient-Specific Goal (Individualized)  Outcome: Ongoing, Progressing  Goal: Absence of Hospital-Acquired Illness or Injury  Outcome: Ongoing, Progressing  Goal: Optimal Comfort and Wellbeing  Outcome: Ongoing, Progressing  Goal: Readiness for Transition of Care  Outcome: Ongoing, Progressing     Problem: Skin Injury Risk Increased  Goal: Skin Health and Integrity  Outcome: Ongoing, Progressing     Problem: Diabetes Comorbidity  Goal: Blood Glucose Level Within Targeted Range  Outcome: Ongoing, Progressing     Problem: Impaired Wound Healing  Goal: Optimal Wound Healing  Outcome: Ongoing, Progressing     Problem: Coping Ineffective  Goal: Effective Coping  Outcome: Ongoing, Progressing     Patient resting in bed listening to music and using cell phone, no c/o pain or discomfort. Breathing even and unlabored. Bed in low position, call light in reach, rails up x 3, No acute distress noted at this time.

## 2023-11-08 NOTE — PLAN OF CARE
Problem: Occupational Therapy  Goal: Occupational Therapy Goal  Description: Goals to be met by: 11-16-23     Patient will increase functional independence with ADLs by performing:    UE Dressing with Supervision.  LE Dressing with Supervision.  Grooming while standing at sink with Supervision.  Toileting from toilet with Supervision for hygiene and clothing management.   Supine to sit with [place].  Stand pivot transfers with Supervision.  Toilet transfer to toilet with Supervision.  Pt. To be I with HEP to BUE to improve level of endurance    Outcome: Ongoing, Progressing     Goals remain appropriate

## 2023-11-08 NOTE — PLAN OF CARE
[facility] - Telemetry Stepdown  Discharge Reassessment    Primary Care Provider: Paolo Catalan MD    Expected Discharge Date: 11/9/2023    Reassessment (most recent)       Discharge Reassessment - 11/08/23 1415          Discharge Reassessment    Assessment Type Discharge Planning Reassessment     Did the patient's condition or plan change since previous assessment? No     Discharge Plan discussed with: Parent(s);Patient     Communicated HI with patient/caregiver Yes     Discharge Plan A Home     Discharge Plan B Home with family;Community Services     DME Needed Upon Discharge  none     Transition of Care Barriers Substance Abuse     Why the patient remains in the hospital Requires continued medical care        Post-Acute Status    Post-Acute Authorization Other     Other Status Community Services     Discharge Delays None known at this time                 Discharge Plan A and Plan B have been determined by review of patient's clinical status, future medical and therapeutic needs, and coverage/benefits for post-acute care in coordination with multidisciplinary team members.    Met with patient and patient's father at bedside. Patient reports he plans to discharge home. Patient confirms he still has the substance abuse resources that were provided to him earlier this admission. Patient reports he will explore his treatment options after discharge. Patient inquired about his stitches being due to be removed; SW notified MD who is in contact with optho team.    Vane Lipscomb, [facility]  [facility]  Ext. [phone]

## 2023-11-08 NOTE — SUBJECTIVE & OBJECTIVE
Interval History:   11/8: valium taper to finish tomorrow.     Review of Systems   Constitutional:  Positive for activity change, appetite change, fatigue and unexpected weight change. Negative for chills, diaphoresis and fever.   HENT:  Negative for sore throat and trouble swallowing.    Eyes:  Negative for photophobia and visual disturbance.   Respiratory:  Negative for cough, shortness of breath and wheezing.    Cardiovascular:  Negative for chest pain, palpitations and leg swelling.   Gastrointestinal:  Negative for abdominal distention, abdominal pain, diarrhea, nausea and vomiting.   Genitourinary:  Negative for dysuria and hematuria.   Musculoskeletal:  Positive for gait problem. Negative for myalgias, neck pain and neck stiffness.   Skin:  Negative for color change and rash.   Neurological:  Positive for weakness, numbness and headaches. Negative for seizures, syncope and light-headedness.   Psychiatric/Behavioral:  Negative for confusion and decreased concentration.      Objective:     Vital Signs (Most Recent):  Temp: 98.7 °F (37.1 °C) (11/08/23 1544)  Pulse: 97 (11/08/23 1544)  Resp: 18 (11/08/23 1633)  BP: 124/85 (11/08/23 1544)  SpO2: 97 % (11/08/23 1544) Vital Signs (24h Range):  Temp:  [97 °F (36.1 °C)-99 °F (37.2 °C)] 98.7 °F (37.1 °C)  Pulse:  [] 97  Resp:  [16-18] 18  SpO2:  [97 %-99 %] 97 %  BP: (124-132)/(71-85) 124/85     Weight: 90.8 kg (200 lb 3.2 oz)  Body mass index is 24.37 kg/m².    Intake/Output Summary (Last 24 hours) at 11/8/2023 1655  Last data filed at 11/7/2023 2247  Gross per 24 hour   Intake --   Output 1020 ml   Net -1020 ml         Physical Exam  Constitutional:       General: He is not in acute distress.     Appearance: He is not toxic-appearing or diaphoretic.   HENT:      Head: Normocephalic.      Comments: Hematoma from fall     Nose: Nose normal.   Eyes:      General: Scleral icterus present.      Extraocular Movements: Extraocular movements intact.      Pupils: Pupils  are equal, round, and reactive to light.   Cardiovascular:      Rate and Rhythm: Regular rhythm. Tachycardia present.   Pulmonary:      Effort: Pulmonary effort is normal. No respiratory distress.      Breath sounds: No wheezing or rales.   Abdominal:      General: Abdomen is flat. There is no distension.      Palpations: Abdomen is soft.      Tenderness: There is no abdominal tenderness. There is no guarding.      Comments: Significant hepatomegaly   Musculoskeletal:         General: Normal range of motion.      Cervical back: Normal range of motion and neck supple. No rigidity.      Right lower leg: No edema.      Left lower leg: No edema.   Skin:     General: Skin is warm and dry.      Coloration: Skin is not jaundiced.   Neurological:      General: No focal deficit present.      Mental Status: He is alert and oriented to person, place, and time.      Cranial Nerves: No cranial nerve deficit.   Psychiatric:         Mood and Affect: Mood normal.         Behavior: Behavior normal.             Significant Labs: All pertinent labs within the past 24 hours have been reviewed.    Significant Imaging: I have reviewed all pertinent imaging results/findings within the past 24 hours.

## 2023-11-08 NOTE — PLAN OF CARE
Problem: Physical Therapy  Goal: Physical Therapy Goal  Description: Goals to be met by: 2023     Patient will increase functional independence with mobility by performin. Supine to sit with Set-up Bee  2. Sit to supine with Set-up Bee  3. Sit to stand transfer with Supervision  4. Bed to chair transfer with Supervision using LRAD  5. Gait  x 250 feet with Supervision using LRAD.   6. Ascend/descend 20 stair with left Handrails Stand-by Assistance using No Assistive Device.   7. Lower extremity exercise program x15 reps per handout, with supervision    Outcome: Ongoing, Progressing

## 2023-11-08 NOTE — PT/OT/SLP PROGRESS
Occupational Therapy   Treatment    Name: Alberto Song  MRN: [id number]  Admitting Diagnosis:  Alcoholic liver disease       Recommendations:     Discharge Recommendations: High Intensity Therapy  Discharge Equipment Recommendations:  walker, rolling  Barriers to discharge:  Decreased caregiver support    Assessment:     Alberto Song is a 48 y.o. male with a medical diagnosis of Alcoholic liver disease.  He presents with good participation and motivation.  Limited session to in bed activities only as pt had just returned to bed after being up in chair several hours & requested light session. Performance deficits affecting function are weakness, impaired endurance, impaired self care skills, impaired balance, impaired functional mobility, decreased upper extremity function, decreased lower extremity function, decreased safety awareness, decreased ROM.     Rehab Prognosis:  Good; patient would benefit from acute skilled OT services to address these deficits and reach maximum level of function.       Plan:     Patient to be seen 4 x/week to address the above listed problems via self-care/home management, therapeutic activities, therapeutic exercises, neuromuscular re-education  Plan of Care Expires: 12/06/23  Plan of Care Reviewed with: patient, family    Subjective     Chief Complaint: lack of sleep in the hospital  Patient/Family Comments/goals: Pt reported that his right arm has always been weaker than the left.  Pain/Comfort:  Pain Rating 1: 0/10  Pain Rating Post-Intervention 1: 0/10    Objective:     Communicated with: RN prior to session.  Patient found supine with Condom Catheter (family member present during session) upon OT entry to room.    General Precautions: Standard, fall, seizure    Orthopedic Precautions:N/A  Braces: N/A  Respiratory Status: Room air     Occupational Performance:       AMPAC 6 Click ADL: 18    Treatment & Education:  Pt performed theraband exercises with red theraband 5 planes with BUE  x 10 reps each.  Provided education via verbal & demonstrated instruction on technique for theraband exercises as well as safety with use of theraband. Provided education on activity throughout the day to facilitate increased endurance & strength.  Pt noted to have decreased ROM in RUE shoulder in comparison to LUE therefore notified RN.  Provided education on safety & need for (A) with OOB activities due to fall risk.  Pt had no further questions & when asked whether there were any concerns pt reported none.      Patient left supine with all lines intact, call button in reach, RN notified, and family member present    GOALS:   Multidisciplinary Problems       Occupational Therapy Goals          Problem: Occupational Therapy    Goal Priority Disciplines Outcome Interventions   Occupational Therapy Goal     OT, PT/OT Ongoing, Progressing    Description: Goals to be met by: 11-16-23     Patient will increase functional independence with ADLs by performing:    UE Dressing with Supervision.  LE Dressing with Supervision.  Grooming while standing at sink with Supervision.  Toileting from toilet with Supervision for hygiene and clothing management.   Supine to sit with [place].  Stand pivot transfers with Supervision.  Toilet transfer to toilet with Supervision.  Pt. To be I with HEP to BUE to improve level of endurance                         Time Tracking:     OT Date of Treatment: 11/08/23  OT Start Time: 1425  OT Stop Time: 1448  OT Total Time (min): 23 min    Billable Minutes:Therapeutic Exercise 23    OT/DERRICK: OT          11/8/2023

## 2023-11-08 NOTE — CONSULTS
"[facility] Consult Note    Reason for consult: "Eye trauma. Removal of stitches?"    HPI: Alberto Song is a 48 y.o. male with no sig POHx who was admitted for alcoholic liver disease. Ophthalmology consulted for recent eye trauma and removal of stitches. Patient states that he fell on Friday and hit the right eye. Patient went to [facility] ED initially and was found to have a small laceration around the right eye, which was repaired by ED provider. He denies any vision changes. No eye pain, irritation, flashes/floaters/curtains, or double vision at any gaze.    POH: none    Gtts: none    Past Medical History:   Diagnosis Date    Asthma     Diabetes mellitus     Hyperlipidemia     Hypertension     Sleep apnea, unspecified     Type 2 diabetes mellitus without complication, without long-term current use of insulin 6/6/2023         Family History   Problem Relation Age of Onset    Cancer Mother     Early death Mother     Hypertension Father            Current Facility-Administered Medications:     albuterol inhaler 2 puff, 2 puff, Inhalation, Q6H PRN **AND** MDI Q6H PRN, , , Q6H PRN, Alex Wong MD    aluminum-magnesium hydroxide-simethicone 200-200-20 mg/5 mL suspension 30 mL, 30 mL, Oral, QID PRN, Alex Wong MD, 30 mL at 11/08/23 0242    ciprofloxacin HCl 0.3 % ophthalmic ointment, , Both Eyes, TID, Shkaira Fajardo MD, Given at 11/08/23 1012    dextrose 10% bolus 125 mL 125 mL, 12.5 g, Intravenous, PRN, Alex Wong MD    dextrose 10% bolus 250 mL 250 mL, 25 g, Intravenous, PRN, Alex Wong MD    diazePAM tablet 2 mg, 2 mg, Oral, Q12H, Shakira Fajardo MD    folic acid tablet 1 mg, 1 mg, Oral, Daily, Alex Wong MD, 1 mg at 11/08/23 1008    glucagon (human recombinant) injection 1 mg, 1 mg, Intramuscular, PRN, Alex Wong MD    glucose chewable tablet 16 g, 16 g, Oral, PRN, Alex Wong MD    glucose chewable tablet 24 g, 24 g, Oral, PRN, Alex Wong " MD MAURA    insulin aspart U-100 pen 0-5 Units, 0-5 Units, Subcutaneous, QID (AC + HS) PRN, Alex Wong MD, 2 Units at 11/08/23 1013    lactulose 20 gram/30 mL solution Soln 30 g, 30 g, Oral, TID, Shakira Fajardo MD, 30 g at 11/08/23 1006    LORazepam injection 2 mg, 2 mg, Intravenous, Q2H PRN, Alex Wong MD, 2 mg at 11/05/23 2302    melatonin tablet 6 mg, 6 mg, Oral, Nightly PRN, Alex Wong MD    multivitamin tablet, 1 tablet, Oral, Daily, Alex Wong MD, 1 tablet at 11/08/23 1009    naloxone 0.4 mg/mL injection 0.02 mg, 0.02 mg, Intravenous, PRN, Alex Wong MD    nicotine 21 mg/24 hr 1 patch, 1 patch, Transdermal, Daily, Olu Zuniga MD, 1 patch at 11/08/23 1010    ondansetron injection 4 mg, 4 mg, Intravenous, Q8H PRN, Alex Wong MD    oxyCODONE immediate release tablet 5 mg, 5 mg, Oral, Q6H PRN, Alex Wong MD, 5 mg at 11/08/23 1008    rifAXIMin tablet 550 mg, 550 mg, Oral, BID, Shakira Fajardo MD, 550 mg at 11/08/23 1010    sodium chloride 0.9% flush 10 mL, 10 mL, Intravenous, Q12H PRN, Alex Wong MD    [START ON 11/9/2023] thiamine (B-1) 250 mg in dextrose 5 % (D5W) 100 mL IVPB, 250 mg, Intravenous, Daily, Shakira Fajardo MD    thiamine (B-1) 500 mg in dextrose 5 % (D5W) 100 mL IVPB, 500 mg, Intravenous, TID, Shakira Fajardo MD, Stopped at 11/08/23 1059      Review of patient's allergies indicates:  No Known Allergies      Social History     Tobacco Use    Smoking status: Every Day     Current packs/day: 1.00     Average packs/day: 1 pack/day for 32.0 years (32.0 ttl pk-yrs)     Types: Cigarettes    Smokeless tobacco: Never   Substance Use Topics    Alcohol use: Not Currently     Comment: One 750ml per day    Drug use: Never         Base Eye Exam       Visual Acuity (Snellen - Linear)         Right Left    Dist sc 20/20 -1 20/20    Dist ph sc 20/20               Tonometry (Tonopen, 3:28 PM)         Right Left    Pressure  "9 11              Pupils         Pupils Dark Light Shape React APD    Right PERRL 5 3 Round Brisk None    Left PERRL 5 3 Round Brisk None              Visual Fields         Right Left     Full Full              Extraocular Movement         Right Left     Full, Ortho Full, Ortho              Dilation       Both eyes: 1% Mydriacyl, 2.5% Phenylephrine @ 3:28 PM                  Slit Lamp and Fundus Exam       External Exam         Right Left    External Ecchymosis, Periorbital edema Normal              Pen Light Exam         Right Left    Lids/Lashes Ecchymosis, 3 sutures c/d/i lateral to RUL - removed at bedside Normal    Conjunctiva/Sclera Subconjunctival hemorrhage temporally, scleral icterus scleral icterus    Cornea Clear Clear    Anterior Chamber Deep and formed Deep and formed    Iris Round and reactive Round and reactive    Lens Clear Clear              Fundus Exam         Right Left    Disc Normal Normal    C/D Ratio 0.3 0.3    Macula Normal Normal    Vessels Normal Normal    Periphery chorioretinal scar infratemporally Normal                  Assessment/Plan  #Facial laceration s/p repair, right side  #Subconjunctival hemorrhage, right eye  - s/p fall and right facial injury 11/5/23  - admitted for alcoholic liver disease  - [facility]  - exam significant for periorbital edema, ecchymosis, and subconj heme  - DFE unremarkable, no commotio retinae, no RD, no intraretinal hemorrhage  - pt had a small facial laceration temporal to the right upper lid s/p repair at [facility] ED on 11/5/23  - sutures removed at bedside, skin healing properly  - will have patient follow up in clinic for repeat DFE and gonioscopy given history of trauma    Patient's best contact number [phone]    Rima Rodriguez (Jessica)  [facility], PGY-2  11/08/2023  3:02 PM    "

## 2023-11-08 NOTE — PROGRESS NOTES
[facility] Pt A&Ox3, sitting on side of the bed, in fair spirits. Pt agreeable to conversation. In summation, pt has very good insight pertaining to the effect that his drinking has effected his body, and how the death of his wife precipitated his excessive alcohol consumption. Pt has very clear plans and goals for how intends to move forward, both with his life and sobriety, post-hospitalization, to include following up with a specific addictions psychiatrist locally, whom he has already had informal dialogue and seems to trust significantly. Pt's biggest concern at present is his physical debility and trouble walking. Pt wants to regain his strength and return to his prior level of functioning, so that he can accomplish his short and long term goals. Support provided. No questions or concerns at this time.     Kassi Pacheco, [facility]  Palliative Medicine

## 2023-11-08 NOTE — NURSING
End of shift note    AAOx4  RA  BM x1  K replaced  Mg replaced  Up with PT- tolerated well  Condom cath- UOP 800cc  VSS  NADN- safety checks performed  Call light in reach

## 2023-11-08 NOTE — PT/OT/SLP PROGRESS
Physical Therapy Treatment    Patient Name:  Alberto Song   MRN:  [id number]    Recommendations:     Discharge Recommendations: High Intensity Therapy  Discharge Equipment Recommendations: to be determined by next level of care  Barriers to discharge: Inaccessible home and Decreased caregiver support    Assessment:     Alberto Song is a 48 y.o. male admitted with a medical diagnosis of Alcoholic liver disease.  He presents with the following impairments/functional limitations: weakness, impaired endurance, impaired functional mobility, gait instability, impaired balance, decreased upper extremity function, decreased lower extremity function, pain. Patient demonstrated improved ambulation while using RW this date, however he required significant recovery time after each gait trial. When asked if patient will benefit from a walker, he reported that the way his home is built is not conducive to him using a walker. Patient also performed stair negotiation, x5 steps, and again required a significant rest break. Patient has demonstrated sufficient progression to warrant high intensity therapy evidenced by objectives noted below.     Rehab Prognosis: Good; patient would benefit from acute skilled PT services to address these deficits and reach maximum level of function.    Recent Surgery: * No surgery found *      Plan:     During this hospitalization, patient to be seen 4 x/week to address the identified rehab impairments via gait training, therapeutic activities, therapeutic exercises, neuromuscular re-education and progress toward the following goals:    Plan of Care Expires:  12/07/23    Subjective     Chief Complaint: Increased Fatigue  Patient/Family Comments/goals: To return to PLOF  Pain/Comfort:  Pain Rating 1: other (see comments)  Pain Rating Post-Intervention 1: other (see comments)      Objective:     Communicated with RN prior to session.  Patient found HOB elevated with Condom Catheter, peripheral IV upon PT  entry to room.     General Precautions: Standard, fall  Orthopedic Precautions: N/A  Braces: N/A  Respiratory Status:  Room air, with CPAP at night    Observations:  Posture Assessment:   Sitting: Increased cervical flexion, downward gaze, rounded shoulders, increased thoracic kyphosis,   Standing: Increased cervical flexion, downward gaze, rounded shoulders, increased thoracic kyphosis, Decreased lumbar lordosis, and PPT    Functional Mobility:  Bed Mobility:     Rolling Right: x1, Supervision  Scooting:   Anteriorly: Supervision  Laterally (to R): Supervision  Boosting (HOB flat): x3, Supervision  Bridging: x3, Supervision  Supine>Sit: x1, SBA  Sit>Supine: x1, SBA    Transfers:    Sit<>Stand:   Trial 1: x1, CGA from EOB with RW  Trial 2: x1, CGA from Stairs (2nd step) with no AD  Trial 3: x1, [place] from Stairs (2nd step) with R HR and RW    Gait:   Trial 1: x132ft, CGA with RW.   Trial 2: x132ft, CGA with RW. VC required for posturing  Total Distance: 264ft. For both trials, seated rest break required due to pt report of significantly increased fatigue. Additionally, observable s/s of SOB and decreased gait speed present.    Gait Assessment: Decreased step length, Decreased gait speed, Decreased foot clearance, and Increased difficulty shifting weight laterally.    Balance:  Sitting Static: Good  Sitting Dynamic: Good-  Standing Static: Good  Standing Dynamic: Good-    Stairs:  Pt ascended/descended 5 stair(s) with No Assistive Device with right handrail with Contact Guard Assistance.   Pt negotiated stairs with step-to pattern    AM-PAC 6 CLICK MOBILITY  Turning over in bed (including adjusting bedclothes, sheets and blankets)?: 4  Sitting down on and standing up from a chair with arms (e.g., wheelchair, bedside commode, etc.): 3  Moving from lying on back to sitting on the side of the bed?: 4  Moving to and from a bed to a chair (including a wheelchair)?: 3  Need to walk in hospital room?: 3  Climbing 3-5 steps  with a railing?: 3  Basic Mobility Total Score: 20     Treatment & Education:  Performed bed mobility to sit EOB  Ambulated in hallway using RW, [id number]  Performed Stair negotiation, x5 steps    Patient Education Provided on:  The role of physical therapy and how the patient can benefit from skilled services  The importance of OOB activity, such as eating meals in bedside chair  All patient questions answered within the PT scope of practice    Patient left HOB elevated with all lines intact, call button in reach, bed alarm on, and RN notified.    GOALS:   Multidisciplinary Problems       Physical Therapy Goals          Problem: Physical Therapy    Goal Priority Disciplines Outcome Goal Variances Interventions   Physical Therapy Goal     PT, PT/OT Ongoing, Progressing     Description: Goals to be met by: 2023     Patient will increase functional independence with mobility by performin. Supine to sit with Set-up [place]  2. Sit to supine with Set-up [place]  3. Sit to stand transfer with Supervision  4. Bed to chair transfer with Supervision using LRAD  5. Gait  x 250 feet with Supervision using LRAD.   6. Ascend/descend 20 stair with left Handrails Stand-by Assistance using No Assistive Device.   7. Lower extremity exercise program x15 reps per handout, with supervision                         Time Tracking:     PT Received On: 23  PT Start Time: 1245     PT Stop Time: 1321  PT Total Time (min): 36 min     Billable Minutes: Gait Training 20 and Therapeutic Activity 16    Treatment Type: Treatment  PT/PTA: PT     Number of PTA visits since last PT visit: 0     2023

## 2023-11-08 NOTE — PROGRESS NOTES
[facility] - Tele[facility] Medicine  Progress Note    Patient Name: Alberto Song  MRN: [id number]  Patient Class: IP- Inpatient   Admission Date: 11/4/2023  Length of Stay: 4 days  Attending Physician: Shakira Fajardo MD  Primary Care Provider: Paolo Catalan MD        Subjective:     Principal Problem:Alcoholic liver disease        HPI:  49 Y/O diabetic, cigarette smoker, and daily ETOH uses/abuser M presents via EMS reporting generalized weakness, fall with closed head injury yesterday and difficulty ambulating secondary to gait instability.  No reported chest/back/abdominal pain.  He reports several months of worsening sensory loss to bilateral feet. Also reports increased alcohol use over last three months as well with associated decreased appetite po intake and as much as 100lb weight loss in the last three months. Reports drinking 1L of hard liqour daily. Triage reported questionable seizure activity, however he denies any falls or new injuries as use been on the sofa since he returned from hospital yesterday given his feeling of generalized lightheadedness and generalized weakness.  No headache, visual changes, ipsilateral numbness or weakness, vomiting/nausea or reported recent illness.    In the ED patient afebrile and hemodynamically stable saturating well on room air at rest. Patient alert and oriented and answering questions. Serum alcohol 205 on presentation though despite patient appears to be in early withdrawals. LFTs elevated AST>>>ALT. TB 6.5. INR 1.3. Alb 2.1, Ca 6.5, Mg 1.4. LA initially 6.8 but trended down to 4.6 after IVF. Initial imaging studies without acute process. Abdomen imaging pending. Patient started on aggressive IVFs and admitted to the care of medicine for further evaluation and management.       Overview/Hospital Course:  No notes on file    Interval History:   11/8: valium taper to finish tomorrow.     Review of Systems   Constitutional:  Positive for activity  change, appetite change, fatigue and unexpected weight change. Negative for chills, diaphoresis and fever.   HENT:  Negative for sore throat and trouble swallowing.    Eyes:  Negative for photophobia and visual disturbance.   Respiratory:  Negative for cough, shortness of breath and wheezing.    Cardiovascular:  Negative for chest pain, palpitations and leg swelling.   Gastrointestinal:  Negative for abdominal distention, abdominal pain, diarrhea, nausea and vomiting.   Genitourinary:  Negative for dysuria and hematuria.   Musculoskeletal:  Positive for gait problem. Negative for myalgias, neck pain and neck stiffness.   Skin:  Negative for color change and rash.   Neurological:  Positive for weakness, numbness and headaches. Negative for seizures, syncope and light-headedness.   Psychiatric/Behavioral:  Negative for confusion and decreased concentration.      Objective:     Vital Signs (Most Recent):  Temp: 98.7 °F (37.1 °C) (11/08/23 1544)  Pulse: 97 (11/08/23 1544)  Resp: 18 (11/08/23 1633)  BP: 124/85 (11/08/23 1544)  SpO2: 97 % (11/08/23 1544) Vital Signs (24h Range):  Temp:  [97 °F (36.1 °C)-99 °F (37.2 °C)] 98.7 °F (37.1 °C)  Pulse:  [] 97  Resp:  [16-18] 18  SpO2:  [97 %-99 %] 97 %  BP: (124-132)/(71-85) 124/85     Weight: 90.8 kg (200 lb 3.2 oz)  Body mass index is 24.37 kg/m².    Intake/Output Summary (Last 24 hours) at 11/8/2023 1655  Last data filed at 11/7/2023 2247  Gross per 24 hour   Intake --   Output 1020 ml   Net -1020 ml         Physical Exam  Constitutional:       General: He is not in acute distress.     Appearance: He is not toxic-appearing or diaphoretic.   HENT:      Head: Normocephalic.      Comments: Hematoma from fall     Nose: Nose normal.   Eyes:      General: Scleral icterus present.      Extraocular Movements: Extraocular movements intact.      Pupils: Pupils are equal, round, and reactive to light.   Cardiovascular:      Rate and Rhythm: Regular rhythm. Tachycardia present.    Pulmonary:      Effort: Pulmonary effort is normal. No respiratory distress.      Breath sounds: No wheezing or rales.   Abdominal:      General: Abdomen is flat. There is no distension.      Palpations: Abdomen is soft.      Tenderness: There is no abdominal tenderness. There is no guarding.      Comments: Significant hepatomegaly   Musculoskeletal:         General: Normal range of motion.      Cervical back: Normal range of motion and neck supple. No rigidity.      Right lower leg: No edema.      Left lower leg: No edema.   Skin:     General: Skin is warm and dry.      Coloration: Skin is not jaundiced.   Neurological:      General: No focal deficit present.      Mental Status: He is alert and oriented to person, place, and time.      Cranial Nerves: No cranial nerve deficit.   Psychiatric:         Mood and Affect: Mood normal.         Behavior: Behavior normal.             Significant Labs: All pertinent labs within the past 24 hours have been reviewed.    Significant Imaging: I have reviewed all pertinent imaging results/findings within the past 24 hours.      Assessment/Plan:      * Alcoholic liver disease  - patient with severe nutrional deficits and acute alcoholic hepatitis / liver failure in setting of severe alcohol use/dependence and inadequate po intake with 100lb weight loss. Pancytopenia and multiple electrolyte derangements.  - repleting K+, Mg, Ca, (phos pending)  - CMP q8h and Mg Phos Q12h for now  - follow up US liver with doppler  - continue aggressive IVFs  - alcohol withdrawal management as below  - sp banana bag x1  - daily FA/MV/thiamine  - fall precautions  - seizure precautions  - monitor tele  - Hepatology consulted ; appreciate recs  - Nutrition consulted ; appreciate recs  - Jenelle's Discriminant Function for Alcoholic Hepatitis score 15. Will defer glucocorticoid therapy at this time  - further management pending clinical course and future study review        Pancytopenia  - suspect  secondary to prolonged severe nutritional deficiency though other etiologies still considered  - follow up iron/TIBC/ferritin/B6/B12/Folate  - continue to monitor      Type 2 diabetes mellitus without complication, without long-term current use of insulin  - SSI   - hypoglycemic protocol    Alcohol use disorder, severe, dependence  - valium 10mg po TID scheduled  - CIWA q4h  - ativan prn CIWA  - sp banana bag x1  - daily thiamine/FA/MV  - seizure precautions  - fall precautions        VTE Risk Mitigation (From admission, onward)         Ordered     IP VTE LOW RISK PATIENT  Once         11/04/23 2118     Place sequential compression device  Until discontinued         11/04/23 2118                Discharge Planning   HI: 11/9/2023     Code Status: Full Code   Is the patient medically ready for discharge?:     Reason for patient still in hospital (select all that apply): Patient trending condition  Discharge Plan A: Home   Discharge Delays: None known at this time              Shakira Fajardo MD  Department of Hospital Medicine   [facility] - [facility] Stepdown

## 2023-11-09 LAB
ALBUMIN SERPL BCP-MCNC: 2 G/DL (ref 3.5–5.2)
ALP SERPL-CCNC: 267 U/L (ref 55–135)
ALT SERPL W/O P-5'-P-CCNC: 56 U/L (ref 10–44)
ANION GAP SERPL CALC-SCNC: 8 MMOL/L (ref 8–16)
ANISOCYTOSIS BLD QL SMEAR: SLIGHT
AST SERPL-CCNC: 159 U/L (ref 10–40)
BASOPHILS # BLD AUTO: 0.04 K/UL (ref 0–0.2)
BASOPHILS NFR BLD: 1.4 % (ref 0–1.9)
BILIRUB SERPL-MCNC: 10.9 MG/DL (ref 0.1–1)
BUN SERPL-MCNC: 8 MG/DL (ref 6–20)
CALCIUM SERPL-MCNC: 7.5 MG/DL (ref 8.7–10.5)
CHLORIDE SERPL-SCNC: 101 MMOL/L (ref 95–110)
CLINICAL BIOCHEMIST REVIEW: NORMAL
CO2 SERPL-SCNC: 24 MMOL/L (ref 23–29)
CREAT SERPL-MCNC: 0.6 MG/DL (ref 0.5–1.4)
DIFFERENTIAL METHOD: ABNORMAL
EOSINOPHIL # BLD AUTO: 0.1 K/UL (ref 0–0.5)
EOSINOPHIL NFR BLD: 2.1 % (ref 0–8)
ERYTHROCYTE [DISTWIDTH] IN BLOOD BY AUTOMATED COUNT: 16.6 % (ref 11.5–14.5)
ERYTHROCYTE [DISTWIDTH] IN BLOOD BY AUTOMATED COUNT: 16.8 % (ref 11.5–14.5)
EST. GFR  (NO RACE VARIABLE): >60 ML/MIN/1.73 M^2
GLUCOSE SERPL-MCNC: 207 MG/DL (ref 70–110)
HCT VFR BLD AUTO: 22 % (ref 40–54)
HCT VFR BLD AUTO: 24 % (ref 40–54)
HGB BLD-MCNC: 7 G/DL (ref 14–18)
HGB BLD-MCNC: 7.5 G/DL (ref 14–18)
IMM GRANULOCYTES # BLD AUTO: 0.11 K/UL (ref 0–0.04)
IMM GRANULOCYTES NFR BLD AUTO: 3.9 % (ref 0–0.5)
INR PPP: 1.2 (ref 0.8–1.2)
LYMPHOCYTES # BLD AUTO: 0.9 K/UL (ref 1–4.8)
LYMPHOCYTES NFR BLD: 32.2 % (ref 18–48)
MAGNESIUM SERPL-MCNC: 1.7 MG/DL (ref 1.6–2.6)
MAGNESIUM SERPL-MCNC: 1.7 MG/DL (ref 1.6–2.6)
MCH RBC QN AUTO: 34.7 PG (ref 27–31)
MCH RBC QN AUTO: 35.2 PG (ref 27–31)
MCHC RBC AUTO-ENTMCNC: 31.3 G/DL (ref 32–36)
MCHC RBC AUTO-ENTMCNC: 31.8 G/DL (ref 32–36)
MCV RBC AUTO: 109 FL (ref 82–98)
MCV RBC AUTO: 113 FL (ref 82–98)
MONOCYTES # BLD AUTO: 0.5 K/UL (ref 0.3–1)
MONOCYTES NFR BLD: 15.9 % (ref 4–15)
NEUTROPHILS # BLD AUTO: 1.3 K/UL (ref 1.8–7.7)
NEUTROPHILS NFR BLD: 44.5 % (ref 38–73)
NRBC BLD-RTO: 4 /100 WBC
PHOSPHATE SERPL-MCNC: 1.2 MG/DL (ref 2.7–4.5)
PLATELET # BLD AUTO: 117 K/UL (ref 150–450)
PLATELET # BLD AUTO: 134 K/UL (ref 150–450)
PLPETH BLD-MCNC: 1630 NG/ML
PMV BLD AUTO: 10.9 FL (ref 9.2–12.9)
PMV BLD AUTO: 11.2 FL (ref 9.2–12.9)
POCT GLUCOSE: 205 MG/DL (ref 70–110)
POCT GLUCOSE: 230 MG/DL (ref 70–110)
POCT GLUCOSE: 240 MG/DL (ref 70–110)
POCT GLUCOSE: 281 MG/DL (ref 70–110)
POCT GLUCOSE: 283 MG/DL (ref 70–110)
POIKILOCYTOSIS BLD QL SMEAR: SLIGHT
POLYCHROMASIA BLD QL SMEAR: ABNORMAL
POPETH BLD-MCNC: 1023 NG/ML
POTASSIUM SERPL-SCNC: 3.5 MMOL/L (ref 3.5–5.1)
PROT SERPL-MCNC: 4.4 G/DL (ref 6–8.4)
PROTHROMBIN TIME: 13 SEC (ref 9–12.5)
PYRIDOXAL SERPL-MCNC: 8 UG/L (ref 5–50)
RBC # BLD AUTO: 2.02 M/UL (ref 4.6–6.2)
RBC # BLD AUTO: 2.13 M/UL (ref 4.6–6.2)
SODIUM SERPL-SCNC: 133 MMOL/L (ref 136–145)
STOMATOCYTES BLD QL SMEAR: PRESENT
TARGETS BLD QL SMEAR: ABNORMAL
WBC # BLD AUTO: 2.7 K/UL (ref 3.9–12.7)
WBC # BLD AUTO: 2.83 K/UL (ref 3.9–12.7)

## 2023-11-09 PROCEDURE — 84100 ASSAY OF PHOSPHORUS: CPT | Performed by: HOSPITALIST

## 2023-11-09 PROCEDURE — 63600175 PHARM REV CODE 636 W HCPCS: Performed by: STUDENT IN AN ORGANIZED HEALTH CARE EDUCATION/TRAINING PROGRAM

## 2023-11-09 PROCEDURE — 25000003 PHARM REV CODE 250: Performed by: FAMILY MEDICINE

## 2023-11-09 PROCEDURE — 85027 COMPLETE CBC AUTOMATED: CPT | Performed by: STUDENT IN AN ORGANIZED HEALTH CARE EDUCATION/TRAINING PROGRAM

## 2023-11-09 PROCEDURE — 97110 THERAPEUTIC EXERCISES: CPT

## 2023-11-09 PROCEDURE — 80053 COMPREHEN METABOLIC PANEL: CPT | Performed by: HOSPITALIST

## 2023-11-09 PROCEDURE — 25000003 PHARM REV CODE 250: Performed by: STUDENT IN AN ORGANIZED HEALTH CARE EDUCATION/TRAINING PROGRAM

## 2023-11-09 PROCEDURE — 63600175 PHARM REV CODE 636 W HCPCS: Performed by: HOSPITALIST

## 2023-11-09 PROCEDURE — 20600001 HC STEP DOWN PRIVATE ROOM

## 2023-11-09 PROCEDURE — 85025 COMPLETE CBC W/AUTO DIFF WBC: CPT | Performed by: HOSPITALIST

## 2023-11-09 PROCEDURE — S4991 NICOTINE PATCH NONLEGEND: HCPCS | Performed by: STUDENT IN AN ORGANIZED HEALTH CARE EDUCATION/TRAINING PROGRAM

## 2023-11-09 PROCEDURE — 99900035 HC TECH TIME PER 15 MIN (STAT)

## 2023-11-09 PROCEDURE — 85610 PROTHROMBIN TIME: CPT | Performed by: FAMILY MEDICINE

## 2023-11-09 PROCEDURE — 36415 COLL VENOUS BLD VENIPUNCTURE: CPT | Performed by: STUDENT IN AN ORGANIZED HEALTH CARE EDUCATION/TRAINING PROGRAM

## 2023-11-09 PROCEDURE — 83735 ASSAY OF MAGNESIUM: CPT | Performed by: HOSPITALIST

## 2023-11-09 PROCEDURE — 94660 CPAP INITIATION&MGMT: CPT

## 2023-11-09 PROCEDURE — 25000003 PHARM REV CODE 250: Performed by: HOSPITALIST

## 2023-11-09 PROCEDURE — 36415 COLL VENOUS BLD VENIPUNCTURE: CPT | Performed by: FAMILY MEDICINE

## 2023-11-09 PROCEDURE — 27000221 HC OXYGEN, UP TO 24 HOURS

## 2023-11-09 PROCEDURE — 97116 GAIT TRAINING THERAPY: CPT

## 2023-11-09 PROCEDURE — 94761 N-INVAS EAR/PLS OXIMETRY MLT: CPT

## 2023-11-09 RX ORDER — POTASSIUM CHLORIDE 20 MEQ/1
40 TABLET, EXTENDED RELEASE ORAL ONCE
Status: COMPLETED | OUTPATIENT
Start: 2023-11-09 | End: 2023-11-09

## 2023-11-09 RX ORDER — MAGNESIUM SULFATE HEPTAHYDRATE 40 MG/ML
2 INJECTION, SOLUTION INTRAVENOUS ONCE
Status: COMPLETED | OUTPATIENT
Start: 2023-11-09 | End: 2023-11-09

## 2023-11-09 RX ADMIN — Medication 1 PATCH: at 08:11

## 2023-11-09 RX ADMIN — INSULIN ASPART 2 UNITS: 100 INJECTION, SOLUTION INTRAVENOUS; SUBCUTANEOUS at 01:11

## 2023-11-09 RX ADMIN — FOLIC ACID 1 MG: 1 TABLET ORAL at 08:11

## 2023-11-09 RX ADMIN — CIPROFLOXACIN HYDROCHLORIDE: 3 OINTMENT OPHTHALMIC at 04:11

## 2023-11-09 RX ADMIN — INSULIN ASPART 3 UNITS: 100 INJECTION, SOLUTION INTRAVENOUS; SUBCUTANEOUS at 06:11

## 2023-11-09 RX ADMIN — POTASSIUM PHOSPHATE, MONOBASIC POTASSIUM PHOSPHATE, DIBASIC 30 MMOL: 224; 236 INJECTION, SOLUTION, CONCENTRATE INTRAVENOUS at 10:11

## 2023-11-09 RX ADMIN — RIFAXIMIN 550 MG: 550 TABLET ORAL at 08:11

## 2023-11-09 RX ADMIN — THERA TABS 1 TABLET: TAB at 08:11

## 2023-11-09 RX ADMIN — MAGNESIUM SULFATE HEPTAHYDRATE 2 G: 40 INJECTION, SOLUTION INTRAVENOUS at 11:11

## 2023-11-09 RX ADMIN — INSULIN ASPART 2 UNITS: 100 INJECTION, SOLUTION INTRAVENOUS; SUBCUTANEOUS at 08:11

## 2023-11-09 RX ADMIN — CIPROFLOXACIN HYDROCHLORIDE: 3 OINTMENT OPHTHALMIC at 08:11

## 2023-11-09 RX ADMIN — THIAMINE HYDROCHLORIDE 250 MG: 100 INJECTION, SOLUTION INTRAMUSCULAR; INTRAVENOUS at 08:11

## 2023-11-09 RX ADMIN — POTASSIUM CHLORIDE 40 MEQ: 1500 TABLET, EXTENDED RELEASE ORAL at 11:11

## 2023-11-09 RX ADMIN — INSULIN ASPART 1 UNITS: 100 INJECTION, SOLUTION INTRAVENOUS; SUBCUTANEOUS at 10:11

## 2023-11-09 NOTE — PLAN OF CARE
Problem: Adult Inpatient Plan of Care  Goal: Plan of Care Review  Outcome: Ongoing, Progressing  Goal: Patient-Specific Goal (Individualized)  Outcome: Ongoing, Progressing  Goal: Optimal Comfort and Wellbeing  Outcome: Ongoing, Progressing  Goal: Readiness for Transition of Care  Outcome: Ongoing, Progressing     Problem: Skin Injury Risk Increased  Goal: Skin Health and Integrity  Outcome: Ongoing, Progressing     Problem: Diabetes Comorbidity  Goal: Blood Glucose Level Within Targeted Range  Outcome: Ongoing, Progressing     Problem: Impaired Wound Healing  Goal: Optimal Wound Healing  Outcome: Ongoing, Progressing     Problem: Coping Ineffective  Goal: Effective Coping  Outcome: Ongoing, Progressing     Patient resting in bed quietly using his cell phone. Bed in low position, rails up x 2, call light in reach, no c/o pain, No acute distress noted at this time.

## 2023-11-09 NOTE — PT/OT/SLP PROGRESS
Occupational Therapy   Treatment    Name: Alberto Song  MRN: [id number]  Admitting Diagnosis:  Alcoholic liver disease       Recommendations:     Discharge Recommendations: Moderate Intensity Therapy  Discharge Equipment Recommendations:  walker, rolling  Barriers to discharge:  Decreased caregiver support    Assessment:     Alberto Song is a 48 y.o. male with a medical diagnosis of Alcoholic liver disease.  He presents with limited session due to wooziness per pt from medication being given via IV (per pt). Performance deficits affecting function are weakness, impaired endurance, impaired functional mobility, impaired self care skills, impaired balance, decreased upper extremity function, decreased lower extremity function, decreased safety awareness.     Rehab Prognosis:  Good; patient would benefit from acute skilled OT services to address these deficits and reach maximum level of function.       Plan:     Patient to be seen 4 x/week to address the above listed problems via self-care/home management, therapeutic exercises, therapeutic activities, neuromuscular re-education  Plan of Care Expires: 12/06/23  Plan of Care Reviewed with: patient, father    Subjective     Chief Complaint: wooziness  Patient/Family Comments/goals: Pt reported that the medication in IV is making him woozy.  Pain/Comfort:  Pain Rating 1: 0/10  Pain Rating Post-Intervention 1: 0/10    Objective:     Communicated with: RN prior to session.  Patient found supine with Condom Catheter, peripheral IV (father present during session) upon OT entry to room.    General Precautions: Standard, fall, seizure    Orthopedic Precautions:N/A  Braces: N/A    AMPAC 6 Click ADL: 18    Treatment & Education:  Pt requested theraband exercises only due to wooziness and lethargy.  Pt performed theraband exercises with BUE with red theraband in 5 planes with LUE & 4 planes with RUE x 10 reps each & A/AAROM with right shoulder flexion & chest press x 10 reps each  while supine.  Provided education on POC and continuation of therex with BUE outside of therapy sessions as able as well as getting to chair later in day with nursing assistance.  Pt had no further questions & when asked whether there were any concerns pt reported none.      Patient left supine with all lines intact, call button in reach, RN notified, and father present    GOALS:   Multidisciplinary Problems       Occupational Therapy Goals          Problem: Occupational Therapy    Goal Priority Disciplines Outcome Interventions   Occupational Therapy Goal     OT, PT/OT Ongoing, Progressing    Description: Goals to be met by: 11-16-23     Patient will increase functional independence with ADLs by performing:    UE Dressing with Supervision.  LE Dressing with Supervision.  Grooming while standing at sink with Supervision.  Toileting from toilet with Supervision for hygiene and clothing management.   Supine to sit with [place].  Stand pivot transfers with Supervision.  Toilet transfer to toilet with Supervision.  Pt. To be I with HEP to BUE to improve level of endurance                         Time Tracking:     OT Date of Treatment: 11/09/23  OT Start Time: 1218  OT Stop Time: 1235  OT Total Time (min): 17 min    Billable Minutes:Therapeutic Exercise 17    OT/DERRICK: OT          11/9/2023

## 2023-11-09 NOTE — PT/OT/SLP PROGRESS
Physical Therapy Treatment    Patient Name:  Alberto Song   MRN:  [id number]    Recommendations:     Discharge Recommendations: Low Intensity Therapy  Discharge Equipment Recommendations: none  Barriers to discharge: None    Assessment:     Alberto Song is a 48 y.o. male admitted with a medical diagnosis of Alcoholic liver disease.  He presents with the following impairments/functional limitations: impaired endurance, impaired self care skills, impaired functional mobility, gait instability Pt. cooperative and tolerated treatment well. Pt. progressing with mobility.    Rehab Prognosis: Good; patient would benefit from acute skilled PT services to address these deficits and reach maximum level of function.    Recent Surgery: * No surgery found *      Plan:     During this hospitalization, patient to be seen 4 x/week to address the identified rehab impairments via gait training, therapeutic activities, therapeutic exercises and progress toward the following goals:    Plan of Care Expires:  12/07/23    Subjective     Chief Complaint: no new c/o  Patient/Family Comments/goals: pt. Agreeable to PT  Pain/Comfort:  Pain Rating 1: 0/10  Pain Rating Post-Intervention 1: 0/10      Objective:     Communicated with nursing prior to session.  Patient found supine with Condom Catheter, peripheral IV, telemetry upon PT entry to room.     General Precautions: Standard, fall  Orthopedic Precautions: N/A  Braces: N/A  Respiratory Status: Room air     Functional Mobility:  Bed Mobility:     Rolling Right: supervision  Scooting: supervision  Supine to Sit: supervision  Sit to Supine: supervision  Transfers:     Sit to Stand:  supervision with no AD  Gait: 200' and 150' with SBA without AD or LOB. Pt. amb. with decreased step length and wide YOEL, but improved jaime. Pt. had seated rest break between gait trials.  Balance: fair+      AM-PAC 6 CLICK MOBILITY  Turning over in bed (including adjusting bedclothes, sheets and blankets)?:  4  Sitting down on and standing up from a chair with arms (e.g., wheelchair, bedside commode, etc.): 4  Moving from lying on back to sitting on the side of the bed?: 4  Moving to and from a bed to a chair (including a wheelchair)?: 4  Need to walk in hospital room?: 3  Climbing 3-5 steps with a railing?: 3  Basic Mobility Total Score: 22       Treatment & Education:  Discussed pt.'s progress, goals, and POC.    Patient left supine with all lines intact and call button in reach..    GOALS:   Multidisciplinary Problems       Physical Therapy Goals          Problem: Physical Therapy    Goal Priority Disciplines Outcome Goal Variances Interventions   Physical Therapy Goal     PT, PT/OT Ongoing, Progressing     Description: Goals to be met by: 2023     Patient will increase functional independence with mobility by performin. Supine to sit with Set-up [place]  2. Sit to supine with Set-up [place]  3. Sit to stand transfer with Supervision  4. Bed to chair transfer with Supervision using LRAD  5. Gait  x 250 feet with Supervision using LRAD.   6. Ascend/descend 20 stair with left Handrails Stand-by Assistance using No Assistive Device.   7. Lower extremity exercise program x15 reps per handout, with supervision                         Time Tracking:     PT Received On: 23  PT Start Time: 1116     PT Stop Time: 1139  PT Total Time (min): 23 min     Billable Minutes: Gait Training 23    Treatment Type: Treatment  PT/PTA: PT     Number of PTA visits since last PT visit: 0     2023

## 2023-11-09 NOTE — PLAN OF CARE
Problem: Adult Inpatient Plan of Care  Goal: Plan of Care Review  Outcome: Ongoing, Not Progressing  Goal: Patient-Specific Goal (Individualized)  Outcome: Ongoing, Not Progressing  Goal: Absence of Hospital-Acquired Illness or Injury  Outcome: Ongoing, Not Progressing  Goal: Optimal Comfort and Wellbeing  Outcome: Ongoing, Not Progressing  Goal: Readiness for Transition of Care  Outcome: Ongoing, Not Progressing     Problem: Skin Injury Risk Increased  Goal: Skin Health and Integrity  Outcome: Ongoing, Not Progressing     Problem: Diabetes Comorbidity  Goal: Blood Glucose Level Within Targeted Range  Outcome: Ongoing, Not Progressing     Problem: Impaired Wound Healing  Goal: Optimal Wound Healing  Outcome: Ongoing, Not Progressing     Problem: Coping Ineffective  Goal: Effective Coping  Outcome: Ongoing, Not Progressing   Pt. Currently laying in bed resting quietly resp even and unlabored no distress noted at this time pt ambulates to bedside commode. Safety precautions maintained.

## 2023-11-09 NOTE — PLAN OF CARE
Problem: Occupational Therapy  Goal: Occupational Therapy Goal  Description: Goals to be met by: 11-16-23     Patient will increase functional independence with ADLs by performing:    UE Dressing with Supervision.  LE Dressing with Supervision.  Grooming while standing at sink with Supervision.  Toileting from toilet with Supervision for hygiene and clothing management.   Supine to sit with [place].  Stand pivot transfers with Supervision.  Toilet transfer to toilet with Supervision.  Pt. To be I with HEP to BUE to improve level of endurance    Outcome: Ongoing, Progressing     Goals remain appropriate

## 2023-11-10 VITALS
TEMPERATURE: 98 F | OXYGEN SATURATION: 96 % | HEIGHT: 76 IN | RESPIRATION RATE: 18 BRPM | WEIGHT: 191.38 LBS | DIASTOLIC BLOOD PRESSURE: 89 MMHG | BODY MASS INDEX: 23.31 KG/M2 | SYSTOLIC BLOOD PRESSURE: 132 MMHG | HEART RATE: 85 BPM

## 2023-11-10 PROBLEM — Z72.0 NICOTINE USE: Status: ACTIVE | Noted: 2023-11-10

## 2023-11-10 PROBLEM — N39.41 URGE INCONTINENCE OF URINE: Status: ACTIVE | Noted: 2023-11-10

## 2023-11-10 LAB
ALBUMIN SERPL BCP-MCNC: 2.2 G/DL (ref 3.5–5.2)
ALP SERPL-CCNC: 319 U/L (ref 55–135)
ALT SERPL W/O P-5'-P-CCNC: 65 U/L (ref 10–44)
ANION GAP SERPL CALC-SCNC: 9 MMOL/L (ref 8–16)
AST SERPL-CCNC: 170 U/L (ref 10–40)
BASOPHILS # BLD AUTO: 0.06 K/UL (ref 0–0.2)
BASOPHILS NFR BLD: 1.7 % (ref 0–1.9)
BILIRUB SERPL-MCNC: 13.1 MG/DL (ref 0.1–1)
BILIRUB UR QL STRIP: ABNORMAL
BUN SERPL-MCNC: 7 MG/DL (ref 6–20)
CALCIUM SERPL-MCNC: 7.6 MG/DL (ref 8.7–10.5)
CHLORIDE SERPL-SCNC: 102 MMOL/L (ref 95–110)
CLARITY UR REFRACT.AUTO: ABNORMAL
CO2 SERPL-SCNC: 20 MMOL/L (ref 23–29)
COLOR UR AUTO: YELLOW
CREAT SERPL-MCNC: 0.5 MG/DL (ref 0.5–1.4)
DIFFERENTIAL METHOD: ABNORMAL
EOSINOPHIL # BLD AUTO: 0.1 K/UL (ref 0–0.5)
EOSINOPHIL NFR BLD: 1.7 % (ref 0–8)
ERYTHROCYTE [DISTWIDTH] IN BLOOD BY AUTOMATED COUNT: 17.1 % (ref 11.5–14.5)
EST. GFR  (NO RACE VARIABLE): >60 ML/MIN/1.73 M^2
GLUCOSE SERPL-MCNC: 178 MG/DL (ref 70–110)
GLUCOSE UR QL STRIP: ABNORMAL
HCT VFR BLD AUTO: 27.5 % (ref 40–54)
HGB BLD-MCNC: 8.7 G/DL (ref 14–18)
HGB UR QL STRIP: NEGATIVE
IMM GRANULOCYTES # BLD AUTO: 0.1 K/UL (ref 0–0.04)
IMM GRANULOCYTES NFR BLD AUTO: 2.9 % (ref 0–0.5)
INR PPP: 1.2 (ref 0.8–1.2)
KETONES UR QL STRIP: NEGATIVE
LEUKOCYTE ESTERASE UR QL STRIP: NEGATIVE
LYMPHOCYTES # BLD AUTO: 1.5 K/UL (ref 1–4.8)
LYMPHOCYTES NFR BLD: 42.7 % (ref 18–48)
MAGNESIUM SERPL-MCNC: 1.8 MG/DL (ref 1.6–2.6)
MCH RBC QN AUTO: 35.4 PG (ref 27–31)
MCHC RBC AUTO-ENTMCNC: 31.6 G/DL (ref 32–36)
MCV RBC AUTO: 112 FL (ref 82–98)
MONOCYTES # BLD AUTO: 0.5 K/UL (ref 0.3–1)
MONOCYTES NFR BLD: 14.5 % (ref 4–15)
NEUTROPHILS # BLD AUTO: 1.3 K/UL (ref 1.8–7.7)
NEUTROPHILS NFR BLD: 36.5 % (ref 38–73)
NITRITE UR QL STRIP: NEGATIVE
NRBC BLD-RTO: 3 /100 WBC
PH UR STRIP: 8 [PH] (ref 5–8)
PHOSPHATE SERPL-MCNC: 2.1 MG/DL (ref 2.7–4.5)
PLATELET # BLD AUTO: 158 K/UL (ref 150–450)
PMV BLD AUTO: 11 FL (ref 9.2–12.9)
POCT GLUCOSE: 179 MG/DL (ref 70–110)
POCT GLUCOSE: 219 MG/DL (ref 70–110)
POTASSIUM SERPL-SCNC: 4.4 MMOL/L (ref 3.5–5.1)
PROT SERPL-MCNC: 5.1 G/DL (ref 6–8.4)
PROT UR QL STRIP: NEGATIVE
PROTHROMBIN TIME: 12.4 SEC (ref 9–12.5)
RBC # BLD AUTO: 2.46 M/UL (ref 4.6–6.2)
SODIUM SERPL-SCNC: 131 MMOL/L (ref 136–145)
SP GR UR STRIP: 1.01 (ref 1–1.03)
URN SPEC COLLECT METH UR: ABNORMAL
WBC # BLD AUTO: 3.44 K/UL (ref 3.9–12.7)

## 2023-11-10 PROCEDURE — S4991 NICOTINE PATCH NONLEGEND: HCPCS | Performed by: STUDENT IN AN ORGANIZED HEALTH CARE EDUCATION/TRAINING PROGRAM

## 2023-11-10 PROCEDURE — 83735 ASSAY OF MAGNESIUM: CPT | Performed by: HOSPITALIST

## 2023-11-10 PROCEDURE — 99900035 HC TECH TIME PER 15 MIN (STAT)

## 2023-11-10 PROCEDURE — 81003 URINALYSIS AUTO W/O SCOPE: CPT | Performed by: STUDENT IN AN ORGANIZED HEALTH CARE EDUCATION/TRAINING PROGRAM

## 2023-11-10 PROCEDURE — 94660 CPAP INITIATION&MGMT: CPT

## 2023-11-10 PROCEDURE — 85025 COMPLETE CBC W/AUTO DIFF WBC: CPT | Performed by: HOSPITALIST

## 2023-11-10 PROCEDURE — 25000003 PHARM REV CODE 250: Performed by: STUDENT IN AN ORGANIZED HEALTH CARE EDUCATION/TRAINING PROGRAM

## 2023-11-10 PROCEDURE — 94761 N-INVAS EAR/PLS OXIMETRY MLT: CPT

## 2023-11-10 PROCEDURE — 36415 COLL VENOUS BLD VENIPUNCTURE: CPT | Performed by: STUDENT IN AN ORGANIZED HEALTH CARE EDUCATION/TRAINING PROGRAM

## 2023-11-10 PROCEDURE — 27000221 HC OXYGEN, UP TO 24 HOURS

## 2023-11-10 PROCEDURE — 63600175 PHARM REV CODE 636 W HCPCS: Performed by: HOSPITALIST

## 2023-11-10 PROCEDURE — 84100 ASSAY OF PHOSPHORUS: CPT | Performed by: STUDENT IN AN ORGANIZED HEALTH CARE EDUCATION/TRAINING PROGRAM

## 2023-11-10 PROCEDURE — 85610 PROTHROMBIN TIME: CPT | Performed by: STUDENT IN AN ORGANIZED HEALTH CARE EDUCATION/TRAINING PROGRAM

## 2023-11-10 PROCEDURE — 25000003 PHARM REV CODE 250: Performed by: HOSPITALIST

## 2023-11-10 PROCEDURE — 80053 COMPREHEN METABOLIC PANEL: CPT | Performed by: HOSPITALIST

## 2023-11-10 PROCEDURE — 25000003 PHARM REV CODE 250: Performed by: FAMILY MEDICINE

## 2023-11-10 RX ORDER — SUCRALFATE 1 G/10ML
1 SUSPENSION ORAL EVERY 6 HOURS
Status: DISCONTINUED | OUTPATIENT
Start: 2023-11-10 | End: 2023-11-10 | Stop reason: HOSPADM

## 2023-11-10 RX ORDER — LISINOPRIL 20 MG/1
20 TABLET ORAL DAILY
Qty: 30 TABLET | Refills: 2 | Status: SHIPPED | OUTPATIENT
Start: 2023-11-10 | End: 2024-03-04 | Stop reason: SDUPTHER

## 2023-11-10 RX ORDER — LACTULOSE 10 G/15ML
20 SOLUTION ORAL; RECTAL 3 TIMES DAILY
Qty: 2700 ML | Refills: 2 | Status: SHIPPED | OUTPATIENT
Start: 2023-11-10 | End: 2023-11-21

## 2023-11-10 RX ORDER — FOLIC ACID 1 MG/1
1 TABLET ORAL DAILY
Qty: 30 TABLET | Refills: 2 | Status: SHIPPED | OUTPATIENT
Start: 2023-11-10 | End: 2024-03-04

## 2023-11-10 RX ORDER — METFORMIN HYDROCHLORIDE 500 MG/1
500 TABLET, EXTENDED RELEASE ORAL DAILY
Qty: 30 TABLET | Refills: 0 | Status: SHIPPED | OUTPATIENT
Start: 2023-11-10 | End: 2023-12-27

## 2023-11-10 RX ORDER — LANOLIN ALCOHOL/MO/W.PET/CERES
100 CREAM (GRAM) TOPICAL DAILY
Qty: 30 TABLET | Refills: 2 | Status: SHIPPED | OUTPATIENT
Start: 2023-11-10 | End: 2024-03-04

## 2023-11-10 RX ADMIN — ALUMINUM HYDROXIDE, MAGNESIUM HYDROXIDE, AND SIMETHICONE 30 ML: 200; 200; 20 SUSPENSION ORAL at 12:11

## 2023-11-10 RX ADMIN — THERA TABS 1 TABLET: TAB at 08:11

## 2023-11-10 RX ADMIN — CIPROFLOXACIN HYDROCHLORIDE: 3 OINTMENT OPHTHALMIC at 08:11

## 2023-11-10 RX ADMIN — Medication 1 PATCH: at 08:11

## 2023-11-10 RX ADMIN — RIFAXIMIN 550 MG: 550 TABLET ORAL at 08:11

## 2023-11-10 RX ADMIN — THIAMINE HYDROCHLORIDE 250 MG: 100 INJECTION, SOLUTION INTRAMUSCULAR; INTRAVENOUS at 09:11

## 2023-11-10 RX ADMIN — FOLIC ACID 1 MG: 1 TABLET ORAL at 08:11

## 2023-11-10 NOTE — PLAN OF CARE
[facility] - Tele[facility] Stepdown      HOME HEALTH ORDERS  FACE TO FACE ENCOUNTER    Patient Name: Alberto Song  YOB: 1975    PCP: Paolo Catalan MD   PCP Address: [address] / [place] [address]  PCP Phone Number: [phone]  PCP Fax: [phone]    Encounter Date: 11/4/23    Admit to Home Health    Diagnoses:  Active Hospital Problems    Diagnosis  POA    *Alcoholic liver disease [K70.9]  Yes    Palliative care encounter [Z51.5]  Not Applicable    Pancytopenia [D61.818]  Yes    Alcohol use disorder, severe, dependence [F10.20]  Yes      Resolved Hospital Problems   No resolved problems to display.       Follow Up Appointments:  Future Appointments   Date Time Provider Department Center   1/8/2024  8:20 AM [facility]       Allergies:Review of patient's allergies indicates:  No Known Allergies    Medications: Review discharge medications with patient and family and provide education.      I have seen and examined this patient within the last 30 days. My clinical findings that support the need for the home health skilled services and home bound status are the following:no   Weakness/numbness causing balance and gait disturbance due to Liver Disease making it taxing to leave home.     Diet:   2 gram sodium diet    Referrals/ Consults  Physical Therapy to evaluate and treat. Evaluate for home safety and equipment needs; Establish/upgrade home exercise program. Perform / instruct on therapeutic exercises, gait training, transfer training, and Range of Motion.  Occupational Therapy to evaluate and treat. Evaluate home environment for safety and equipment needs. Perform/Instruct on transfers, ADL training, ROM, and therapeutic exercises.    Activities:   activity as tolerated    Nursing:   Agency to admit patient within 24 hours of hospital discharge unless specified on physician order or at patient request    SN to complete comprehensive assessment  including routine vital signs. Instruct on disease process and s/s of complications to report to MD. Review/verify medication list sent home with the patient at time of discharge  and instruct patient/caregiver as needed. Frequency may be adjusted depending on start of care date.     Skilled nurse to perform up to 3 visits PRN for symptoms related to diagnosis    Notify MD if SBP > 160 or < 90; DBP > 90 or < 50; HR > 120 or < 50; Temp > 101; O2 < 88%    Ok to schedule additional visits based on staff availability and patient request on consecutive days within the home health episode.    I certify that this patient is confined to his home and needs physical therapy and occupational therapy.

## 2023-11-10 NOTE — PT/OT/SLP PROGRESS
Occupational Therapy      Patient Name:  Alberto Song   MRN:  [id number]    Patient not seen today secondary to  (pt sleeping soundly at time of session attempt.  Pt's father reported that this is the first time in a few days that pt has really slept (RN reported pt is discharging today as well) therefore OT & pt's father deemed it appropriate to hold session at time to allow pt to rest). Will follow-up as appropriate.    11/10/2023

## 2023-11-10 NOTE — PLAN OF CARE
[facility] - Tele[facility] Stepdown  Discharge Final Note    Primary Care Provider: Paolo Catalan MD    Expected Discharge Date: 11/10/2023    Final Discharge Note (most recent)       Final Note - 11/10/23 1546          Final Note    Assessment Type Final Discharge Note     Anticipated Discharge Disposition Home-Health Care [facility]     Hospital Resources/Appts/Education Provided Appointments scheduled and added to AVS        Post-Acute Status    Post-Acute Authorization Home Health     Home Health Status Referrals Sent     Discharge Delays None known at this time                   Future Appointments   Date Time Provider Department Center   11/13/2023 10:00 AM Lavonne Combs NP [facility]   11/21/2023  9:20 AM Paolo Catalan MD [facility]   1/8/2024  8:20 AM Paolo Catalan MD [facility]     Vane Lipscomb, LCSW [facility]  Ext. [phone]

## 2023-11-10 NOTE — HOSPITAL COURSE
Alberto Song is a 48 y.o. male with history of alcohol use disorder who presented for generalized weakness, weight loss, recent fall.     Hepatology consulted for elevated LFTs. Suspect chronic liver disease with superimposed alcoholic hepatitis based on history and pattern of LFT elevations. Likely underlying cirrhosis. US Liver with doppler reported Hepatomegaly with hepatic steatosis. Cholecystectomy. Within normal limits liver Doppler evaluation. Obtained serologic workup to rule out secondary causes which is negative to date. He has potential to improve with abstinence from alcohol over next several months.  Recommend outpatient follow up. Titrate lactulose and rifaximin.    He has been tapered off for alcohol withdrawal with valium per addiction psych. Discussed need for abstinence with patient. Noted he declined rehab after discharge when discussing with addiction psych. Outpatient therapist was recommended, list provided. Will continue multivitamins, thiamine and folate as outpatient.    He was also seen by palliative care . He was noted to have good insight pertaining to the effect that his drinking has effected his body, and how the death of his wife precipitated his excessive alcohol consumption. He noted very clear plans and goals for how intends to move forward, both with his life and sobriety, post-hospitalization, to include following up with a specific addictions psychiatrist locally, whom he has already had informal dialogue and seems to trust significantly.     Pt's biggest concern at present is his physical debility and trouble walking. PT/OT evaluated where initially he was recommended High to moderate intensity therapy  however the has rapidly recovered and now only requiring low intensity therapy. He will be prescribed HHPT/OT. Rollator walker arranged.         Review of Systems   Constitutional:  Positive for fatigue. Negative for chills and fever.   HENT:  Negative for sore throat and  trouble swallowing.    Eyes:  Negative for photophobia and visual disturbance.   Respiratory:  Negative for cough and shortness of breath.    Cardiovascular:  Negative for chest pain and palpitations.   Gastrointestinal:  Negative for abdominal distention and abdominal pain.   Genitourinary:  Positive for urgency. Negative for dysuria.   Musculoskeletal:  Positive for myalgias.   Skin:  Negative for color change and rash.   Neurological:  Positive for weakness. Negative for light-headedness and headaches.   Psychiatric/Behavioral:  Negative for confusion and decreased concentration.       Objective:      Vital Signs (Most Recent):  Temp: 97.9 °F (36.6 °C) (11/10/23 1213)  Pulse: 85 (11/10/23 1213)  Resp: 18 (11/10/23 0731)  BP: 132/89 (11/10/23 1213)  SpO2: 96 % (11/10/23 1213) Vital Signs (24h Range):  Temp:  [97.9 °F (36.6 °C)-98.6 °F (37 °C)] 97.9 °F (36.6 °C)  Pulse:  [] 85  Resp:  [17-18] 18  SpO2:  [96 %-98 %] 96 %  BP: (119-145)/(81-89) 132/89      Weight: 86.8 kg (191 lb 5.8 oz)  Body mass index is 23.29 kg/m².     Intake/Output Summary (Last 24 hours) at 11/10/2023 2030  Last data filed at 11/10/2023 1407      Gross per 24 hour   Intake 240 ml   Output 850 ml   Net -610 ml         Physical Exam  Constitutional:       General: He is not in acute distress.  HENT:      Head: Normocephalic.      Comments: Hematoma from fall     Nose: Nose normal.   Eyes:      General: Scleral icterus present.      Extraocular Movements: Extraocular movements intact.      Pupils: Pupils are equal, round, and reactive to light.      Comments: Bruising along the right eye   Cardiovascular:      Rate and Rhythm: Normal rate and regular rhythm.      Heart sounds: No murmur heard.  Pulmonary:      Effort: Pulmonary effort is normal. No respiratory distress.      Breath sounds: Normal breath sounds.   Abdominal:      General: Abdomen is flat. There is no distension.      Palpations: Abdomen is soft.      Tenderness: There is no  abdominal tenderness.   Musculoskeletal:         General: Normal range of motion.      Cervical back: Normal range of motion and neck supple.      Right lower leg: No edema.      Left lower leg: No edema.   Skin:     General: Skin is warm.      Coloration: Skin is not jaundiced.      Comments: Scattered petechiae on arms and abdomen, improving   Neurological:      General: No focal deficit present.      Mental Status: He is alert and oriented to person, place, and time.      Cranial Nerves: No cranial nerve deficit.   Psychiatric:         Mood and Affect: Mood normal.         Behavior: Behavior normal.

## 2023-11-10 NOTE — PLAN OF CARE
Problem: Adult Inpatient Plan of Care  Goal: Plan of Care Review  Outcome: Ongoing, Not Progressing  Goal: Patient-Specific Goal (Individualized)  Outcome: Ongoing, Not Progressing  Goal: Absence of Hospital-Acquired Illness or Injury  Outcome: Ongoing, Not Progressing  Goal: Optimal Comfort and Wellbeing  Outcome: Ongoing, Not Progressing  Goal: Readiness for Transition of Care  Outcome: Ongoing, Not Progressing     Problem: Skin Injury Risk Increased  Goal: Skin Health and Integrity  Outcome: Ongoing, Not Progressing     Problem: Diabetes Comorbidity  Goal: Blood Glucose Level Within Targeted Range  Outcome: Ongoing, Not Progressing    Patient awake during shift with no reported pain or discomfort. Patient educated on blood sugar monitoring.

## 2023-11-10 NOTE — PLAN OF CARE
Met with patient to review discharge recommendation of HH and is agreeable to plan. Patient was on his way off the unit to leave the hospital today. Patient requested to review the list and call SW with his preferred agency. SW encouraged patient to return call ASAP today so a referral can be sent.    Patient/family provided list of facilities in-network with patient's payor plan. Providers that are owned, operated, or affiliated with [facility] are included on the list.     Patient/family instructed to identify preference.    If an additional preferred facility not listed above is identified, additional referral to be sent. If above facilities unable to accept, will send additional referrals to in-network providers.    3:45 PM  Contacted patient to follow up on his HH preference. Patient states he has not looked at the list yet but is agreeable for SW to send a referral to a HH in network with his insurance. Patient states he does not want any phone calls for the next 48 hrs.    BILLY sent a referral in [facility] to [facility].    Vane Lipscomb LCSW  [facility]  Ext. [phone]

## 2023-11-10 NOTE — SUBJECTIVE & OBJECTIVE
Interval History:  Worked with physical therapy earlier today.  He has condom catheter reports new urge incontinence.  Denies any history of prostate issues previously denies any other acute distress.    Review of Systems   Constitutional:  Positive for fatigue. Negative for chills and fever.   HENT:  Negative for sore throat and trouble swallowing.    Eyes:  Negative for photophobia and visual disturbance.   Respiratory:  Negative for cough and shortness of breath.    Cardiovascular:  Negative for chest pain and palpitations.   Gastrointestinal:  Negative for abdominal distention and abdominal pain.   Genitourinary:  Positive for urgency. Negative for dysuria.   Musculoskeletal:  Positive for myalgias.   Skin:  Negative for color change and rash.   Neurological:  Positive for weakness. Negative for light-headedness and headaches.   Psychiatric/Behavioral:  Negative for confusion and decreased concentration.      Objective:     Vital Signs (Most Recent):  Temp: 98.3 °F (36.8 °C) (11/09/23 2008)  Pulse: 96 (11/09/23 2008)  Resp: 17 (11/09/23 2008)  BP: 134/85 (11/09/23 2008)  SpO2: 95 % (11/09/23 2008) Vital Signs (24h Range):  Temp:  [97.9 °F (36.6 °C)-98.7 °F (37.1 °C)] 98.3 °F (36.8 °C)  Pulse:  [] 96  Resp:  [17-19] 17  SpO2:  [93 %-97 %] 95 %  BP: (114-136)/(71-85) 134/85     Weight: 90.8 kg (200 lb 3.2 oz)  Body mass index is 24.37 kg/m².    Intake/Output Summary (Last 24 hours) at 11/9/2023 2117  Last data filed at 11/9/2023 1411  Gross per 24 hour   Intake 720 ml   Output 2075 ml   Net -1355 ml           Physical Exam  Constitutional:       General: He is not in acute distress.     Appearance: He is not ill-appearing.   HENT:      Head: Normocephalic.      Comments: Hematoma from fall     Nose: Nose normal.   Eyes:      General: Scleral icterus present.      Extraocular Movements: Extraocular movements intact.      Pupils: Pupils are equal, round, and reactive to light.      Comments: Bruising along the  right eye   Cardiovascular:      Rate and Rhythm: Normal rate and regular rhythm.      Heart sounds: No murmur heard.  Pulmonary:      Effort: Pulmonary effort is normal. No respiratory distress.      Breath sounds: Normal breath sounds.   Abdominal:      General: Abdomen is flat. There is no distension.      Palpations: Abdomen is soft.      Tenderness: There is no abdominal tenderness.      Comments: Significant hepatomegaly   Musculoskeletal:         General: Normal range of motion.      Cervical back: Normal range of motion and neck supple. No rigidity.      Right lower leg: No edema.      Left lower leg: No edema.   Skin:     General: Skin is warm and dry.      Coloration: Skin is not jaundiced.   Neurological:      General: No focal deficit present.      Mental Status: He is alert and oriented to person, place, and time.      Cranial Nerves: No cranial nerve deficit.   Psychiatric:         Mood and Affect: Mood normal.         Behavior: Behavior normal.             Significant Labs: All pertinent labs within the past 24 hours have been reviewed.    Significant Imaging: I have reviewed all pertinent imaging results/findings within the past 24 hours.

## 2023-11-10 NOTE — ASSESSMENT & PLAN NOTE
He has been tapered off for alcohol withdrawal with valium per addiction psych. Discussed need for abstinence with patient. Noted he declined rehab after discharge when discussing with addiction psych. Outpatient therapist was recommended. Will continue multivitamins, thiamine and folate as outpatient.    He was also seen by palliative care . He was noted to have good insight pertaining to the effect that his drinking has effected his body, and how the death of his wife precipitated his excessive alcohol consumption. He noted very clear plans and goals for how intends to move forward, both with his life and sobriety, post-hospitalization, to include following up with a specific addictions psychiatrist locally, whom he has already had informal dialogue and seems to trust significantly.

## 2023-11-10 NOTE — CHAPLAIN
"Palliative Care     Patient: Alberto Song  MRN: [id number]  : 1975  Age: 48 y.o.  Hospital Length of Stay: 6  Code Status: Code Status Discussion Note  Attending Provider: Neel Brandon MD  Principal Problem: Alcoholic liver disease    Non-clinical observations of patient/room: Visited pall med provider; he was awake, alert, welcomed me into the room; 45 min visit    Pt was communicative and engaging, easily opened up to me once I introduced myself and my role. Pt states he's not in pain at the moment. He shared his back history with his family and the death of his wife which resulted in the very heavy drinking as a coping mechanism. Pt shared about his late wife and became very weepy-- they met at age 10, then later in life the re-connected, dated 15 years and  4 before she  of a sudden heart attack. He has not processed his grief fully and is in complicated grief-- his mother  when he was 11, then became very close to his step-mom and then she . His father remains stoic of those losses, so he feels he needs to be as well and can't grieve with his father. .He confesses he still struggles with grief and admits drinking was a coping mechanism and "stupid".  Despite grieving differences, he's close to his dad, his brother and a friend named Juan. His family gives him strength. Pt expressed relief that his liver hasn't gotten to the point of cirrosis, but he may not be medically aware of other medical issues going on-- or at least didn't express them to me.    What's most important to him is getting well physically, getting stronger, no more drinking. 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 His (perhaps unrealistic?) important goal is to sell the hotel, buy a condo in [place], buy a catamaran, recruit a crew-- "I already have 6 people" and sail the sea--- "without drinking at all!"   Supported his dream, but reminded him that we'll take one step " at a time-- starting with his health and abstinence from alcohol.    Pt is not in spiritual distress, we had lengthy theological and biblical conversation. He grew up in a school that alternating Scientologist beliefs and Faith beliefs with each elementary grade. Bottom line pt believes in a Higher Power, but not the detail in the Torah, Faith Bible and Qu'ran.    I talked about rehab, AA, etc. And pt said he'd be more successful with 1:1 counseling. Pt could benefit from visits with palliative SW/Therapist and he agreed. I did not promise, nor commit.      Spiritual Goals of Care    Present during the visit: Patient  What gives you strength as you think about your illness?: Family  What are your most important life goals as it relates to your illness?: Achieving an important goal (see above) and getting better  What's your biggest fear or concern as it relates to your illness?: Not improving to acceptable QOL  How is your oseas, spirituality, Mu-ism rituals helping you throughout your illness?: n/a  How much does your family know about your priorities and wishes?: Not discussed  What oseas tradition/Christianity does pt identify?: Agnostic  Is the patient in spiritual distress?: No  Does the patient welcome prayer?: No  Will palliative  continue to follow?: Yes    Future (Spiritual Care Plan of Action):  Palliative  will continue to follow while admitted and will talk to Johanna Palliative therapist.       **Spiritual Care Dept. Chaplains are available evenings, overnight and weekends **    In Peace,  Rev. Vanna Abdi MDiv, [facility]  Board Certified   Palliative Medicine Department  [phone]

## 2023-11-10 NOTE — PROGRESS NOTES
[facility] Medicine  Progress Note    Patient Name: Alberto Song  MRN: [id number]  Patient Class: IP- Inpatient   Admission Date: 11/4/2023  Length of Stay: 5 days  Attending Physician: Neel Brandon MD  Primary Care Provider: Paolo Catalan MD        Subjective:     Principal Problem:Alcoholic liver disease        HPI:  49 Y/O diabetic, cigarette smoker, and daily ETOH uses/abuser M presents via EMS reporting generalized weakness, fall with closed head injury yesterday and difficulty ambulating secondary to gait instability.  No reported chest/back/abdominal pain.  He reports several months of worsening sensory loss to bilateral feet. Also reports increased alcohol use over last three months as well with associated decreased appetite po intake and as much as 100lb weight loss in the last three months. Reports drinking 1L of hard liqour daily. Triage reported questionable seizure activity, however he denies any falls or new injuries as use been on the sofa since he returned from hospital yesterday given his feeling of generalized lightheadedness and generalized weakness.  No headache, visual changes, ipsilateral numbness or weakness, vomiting/nausea or reported recent illness.    In the ED patient afebrile and hemodynamically stable saturating well on room air at rest. Patient alert and oriented and answering questions. Serum alcohol 205 on presentation though despite patient appears to be in early withdrawals. LFTs elevated AST>>>ALT. TB 6.5. INR 1.3. Alb 2.1, Ca 6.5, Mg 1.4. LA initially 6.8 but trended down to 4.6 after IVF. Initial imaging studies without acute process. Abdomen imaging pending. Patient started on aggressive IVFs and admitted to the care of medicine for further evaluation and management.     Overview/Hospital Course:  Alberto Song is a 48 y.o. male with history of alcohol use disorder who presented for generalized weakness, weight loss, recent fall.     Hepatology  consulted for elevated LFTs. Suspect chronic liver disease with superimposed alcoholic hepatitis based on history and pattern of LFT elevations. Likely underlying cirrhosis. US Liver with doppler reported Hepatomegaly with hepatic steatosis. Cholecystectomy. Within normal limits liver Doppler evaluation. Obtained serologic workup to rule out secondary causes which is negative to date. He has potential to improve with abstinence from alcohol over next several months.  Recommend outpatient follow up. Titrate lactulose and rifaximin.    He has been tapered off for alcohol withdrawal with valium per addiction psych. Discussed need for abstinence with patient. Noted he declined rehab after discharge when discussing with addiction psych. Outpatient therapist was recommended. Will continue multivitamins, thiamine and folate as outpatient.    He was also seen by palliative care . He was noted to have good insight pertaining to the effect that his drinking has effected his body, and how the death of his wife precipitated his excessive alcohol consumption. He noted very clear plans and goals for how intends to move forward, both with his life and sobriety, post-hospitalization, to include following up with a specific addictions psychiatrist locally, whom he has already had informal dialogue and seems to trust significantly.     Pt's biggest concern at present is his physical debility and trouble walking. Pt wants to regain his strength and return to his prior level of functioning, so that he can accomplish his short and long term goals. PT/OT evaluated where initially he was recommended High to moderate intensity therapy  however the has rapidly recovered and now only requiring low intensity therapy. He will be prescribed HHPT.            Interval History:  Worked with physical therapy earlier today.  He has condom catheter reports new urge incontinence.  Denies any history of prostate issues previously denies any other acute  distress.    Review of Systems   Constitutional:  Positive for fatigue. Negative for chills and fever.   HENT:  Negative for sore throat and trouble swallowing.    Eyes:  Negative for photophobia and visual disturbance.   Respiratory:  Negative for cough and shortness of breath.    Cardiovascular:  Negative for chest pain and palpitations.   Gastrointestinal:  Negative for abdominal distention and abdominal pain.   Genitourinary:  Positive for urgency. Negative for dysuria.   Musculoskeletal:  Positive for myalgias.   Skin:  Negative for color change and rash.   Neurological:  Positive for weakness. Negative for light-headedness and headaches.   Psychiatric/Behavioral:  Negative for confusion and decreased concentration.      Objective:     Vital Signs (Most Recent):  Temp: 98.3 °F (36.8 °C) (11/09/23 2008)  Pulse: 96 (11/09/23 2008)  Resp: 17 (11/09/23 2008)  BP: 134/85 (11/09/23 2008)  SpO2: 95 % (11/09/23 2008) Vital Signs (24h Range):  Temp:  [97.9 °F (36.6 °C)-98.7 °F (37.1 °C)] 98.3 °F (36.8 °C)  Pulse:  [] 96  Resp:  [17-19] 17  SpO2:  [93 %-97 %] 95 %  BP: (114-136)/(71-85) 134/85     Weight: 90.8 kg (200 lb 3.2 oz)  Body mass index is 24.37 kg/m².    Intake/Output Summary (Last 24 hours) at 11/9/2023 2117  Last data filed at 11/9/2023 1411  Gross per 24 hour   Intake 720 ml   Output 2075 ml   Net -1355 ml           Physical Exam  Constitutional:       General: He is not in acute distress.     Appearance: He is not ill-appearing.   HENT:      Head: Normocephalic.      Comments: Hematoma from fall     Nose: Nose normal.   Eyes:      General: Scleral icterus present.      Extraocular Movements: Extraocular movements intact.      Pupils: Pupils are equal, round, and reactive to light.      Comments: Bruising along the right eye   Cardiovascular:      Rate and Rhythm: Normal rate and regular rhythm.      Heart sounds: No murmur heard.  Pulmonary:      Effort: Pulmonary effort is normal. No respiratory  distress.      Breath sounds: Normal breath sounds.   Abdominal:      General: Abdomen is flat. There is no distension.      Palpations: Abdomen is soft.      Tenderness: There is no abdominal tenderness.      Comments: Significant hepatomegaly   Musculoskeletal:         General: Normal range of motion.      Cervical back: Normal range of motion and neck supple. No rigidity.      Right lower leg: No edema.      Left lower leg: No edema.   Skin:     General: Skin is warm and dry.      Coloration: Skin is not jaundiced.   Neurological:      General: No focal deficit present.      Mental Status: He is alert and oriented to person, place, and time.      Cranial Nerves: No cranial nerve deficit.   Psychiatric:         Mood and Affect: Mood normal.         Behavior: Behavior normal.             Significant Labs: All pertinent labs within the past 24 hours have been reviewed.    Significant Imaging: I have reviewed all pertinent imaging results/findings within the past 24 hours.    Assessment/Plan:      * Alcoholic liver disease    Suspect chronic liver disease with superimposed alcoholic hepatitis based on history and pattern of LFT elevations. Maddrey's Discriminant Function for Alcoholic Hepatitis score 15. No glucocorticoid therapy  needed.  -Likely underlying cirrhosis. US Liver with doppler reported Hepatomegaly with hepatic steatosis. Cholecystectomy. Within normal limits liver Doppler evaluation. Obtained serologic workup to rule out secondary causes which is negative to date. He has potential to improve with abstinence from alcohol over next several months.  Recommend outpatient follow up. Titrate lactulose and rifaximin.    Will continue multivitamins, thiamine and folate as outpatient.           Pancytopenia  Hb and platelets trended back to June which is when they were normal however similar drop noted in January.     - suspect secondary to prolonged severe nutritional deficiency though other etiologies still  considered  - follow up iron/TIBC/ferritin/B6/B12/Folate. Not iron deficient and B12 also appropriate. Await folic acid     Recommend discharge on thiamine and multivitamins and follow up with PCP     Type 2 diabetes mellitus without complication, without long-term current use of insulin  - SSI   - hypoglycemic protocol    Alcohol use disorder, severe, dependence  He has been tapered off for alcohol withdrawal with valium per addiction psych. Discussed need for abstinence with patient. Noted he declined rehab after discharge when discussing with addiction psych. Outpatient therapist was recommended. Will continue multivitamins, thiamine and folate as outpatient.    He was also seen by palliative care . He was noted to have good insight pertaining to the effect that his drinking has effected his body, and how the death of his wife precipitated his excessive alcohol consumption. He noted very clear plans and goals for how intends to move forward, both with his life and sobriety, post-hospitalization, to include following up with a specific addictions psychiatrist locally, whom he has already had informal dialogue and seems to trust significantly.            VTE Risk Mitigation (From admission, onward)           Ordered     IP VTE LOW RISK PATIENT  Once         11/04/23 2118     Place sequential compression device  Until discontinued         11/04/23 2118                    Discharge Planning   HI: 11/10/2023     Code Status: Full Code   Is the patient medically ready for discharge?:     Reason for patient still in hospital (select all that apply): Patient trending condition, Treatment, and PT / OT recommendations  Discharge Plan A: Home   Discharge Delays: None known at this time              Neel Brandon MD  Department of Hospital Medicine   [facility] - Telemetry Stepdown

## 2023-11-10 NOTE — ASSESSMENT & PLAN NOTE
Suspect chronic liver disease with superimposed alcoholic hepatitis based on history and pattern of LFT elevations. Maddrey's Discriminant Function for Alcoholic Hepatitis score 15. No glucocorticoid therapy  needed.  -Likely underlying cirrhosis. US Liver with doppler reported Hepatomegaly with hepatic steatosis. Cholecystectomy. Within normal limits liver Doppler evaluation. Obtained serologic workup to rule out secondary causes which is negative to date. He has potential to improve with abstinence from alcohol over next several months.  Recommend outpatient follow up. Titrate lactulose and rifaximin.    Will continue multivitamins, thiamine and folate as outpatient.

## 2023-11-10 NOTE — ASSESSMENT & PLAN NOTE
Hb and platelets trended back to June which is when they were normal however similar drop noted in January.     - suspect secondary to prolonged severe nutritional deficiency though other etiologies still considered  - follow up iron/TIBC/ferritin/B6/B12/Folate. Not iron deficient and B12 also appropriate. Await folic acid     Recommend discharge on thiamine and multivitamins and follow up with PCP

## 2023-11-11 NOTE — DISCHARGE SUMMARY
[facility] Medicine  Discharge Summary      Patient Name: Alberto Song  MRN: [id number]  CONSTANTINE: [id number]  Patient Class: IP- Inpatient  Admission Date: 11/4/2023  Hospital Length of Stay: 6 days  Discharge Date and Time: 11/10/2023  5:20 PM  Attending Physician: Stacey att. providers found   Discharging Provider: Neel Brandon MD  Primary Care Provider: Paolo Catalan MD  [facility] Medicine Team: [facility] MED L Neel Brandon MD  Primary Care Team: [facility] L    HPI:   49 Y/O diabetic, cigarette smoker, and daily ETOH uses/abuser M presents via EMS reporting generalized weakness, fall with closed head injury yesterday and difficulty ambulating secondary to gait instability.  No reported chest/back/abdominal pain.  He reports several months of worsening sensory loss to bilateral feet. Also reports increased alcohol use over last three months as well with associated decreased appetite po intake and as much as 100lb weight loss in the last three months. Reports drinking 1L of hard liqour daily. Triage reported questionable seizure activity, however he denies any falls or new injuries as use been on the sofa since he returned from hospital yesterday given his feeling of generalized lightheadedness and generalized weakness.  No headache, visual changes, ipsilateral numbness or weakness, vomiting/nausea or reported recent illness.    In the ED patient afebrile and hemodynamically stable saturating well on room air at rest. Patient alert and oriented and answering questions. Serum alcohol 205 on presentation though despite patient appears to be in early withdrawals. LFTs elevated AST>>>ALT. TB 6.5. INR 1.3. Alb 2.1, Ca 6.5, Mg 1.4. LA initially 6.8 but trended down to 4.6 after IVF. Initial imaging studies without acute process. Abdomen imaging pending. Patient started on aggressive IVFs and admitted to the care of medicine for further evaluation and management.     * No surgery found *       Hospital Course:   Alberto Song is a 48 y.o. male with history of alcohol use disorder who presented for generalized weakness, weight loss, recent fall.     Hepatology consulted for elevated LFTs. Suspect chronic liver disease with superimposed alcoholic hepatitis based on history and pattern of LFT elevations. Likely underlying cirrhosis. US Liver with doppler reported Hepatomegaly with hepatic steatosis. Cholecystectomy. Within normal limits liver Doppler evaluation. Obtained serologic workup to rule out secondary causes which is negative to date. He has potential to improve with abstinence from alcohol over next several months.  Recommend outpatient follow up. Titrate lactulose and rifaximin.    He has been tapered off for alcohol withdrawal with valium per addiction psych. Discussed need for abstinence with patient. Noted he declined rehab after discharge when discussing with addiction psych. Outpatient therapist was recommended, list provided. Will continue multivitamins, thiamine and folate as outpatient.    He was also seen by palliative care . He was noted to have good insight pertaining to the effect that his drinking has effected his body, and how the death of his wife precipitated his excessive alcohol consumption. He noted very clear plans and goals for how intends to move forward, both with his life and sobriety, post-hospitalization, to include following up with a specific addictions psychiatrist locally, whom he has already had informal dialogue and seems to trust significantly.     Pt's biggest concern at present is his physical debility and trouble walking. PT/OT evaluated where initially he was recommended High to moderate intensity therapy  however the has rapidly recovered and now only requiring low intensity therapy. He will be prescribed HHPT/OT. Rollator walker arranged.         Review of Systems   Constitutional:  Positive for fatigue. Negative for chills and fever.   HENT:  Negative for  sore throat and trouble swallowing.    Eyes:  Negative for photophobia and visual disturbance.   Respiratory:  Negative for cough and shortness of breath.    Cardiovascular:  Negative for chest pain and palpitations.   Gastrointestinal:  Negative for abdominal distention and abdominal pain.   Genitourinary:  Positive for urgency. Negative for dysuria.   Musculoskeletal:  Positive for myalgias.   Skin:  Negative for color change and rash.   Neurological:  Positive for weakness. Negative for light-headedness and headaches.   Psychiatric/Behavioral:  Negative for confusion and decreased concentration.       Objective:      Vital Signs (Most Recent):  Temp: 97.9 °F (36.6 °C) (11/10/23 1213)  Pulse: 85 (11/10/23 1213)  Resp: 18 (11/10/23 0731)  BP: 132/89 (11/10/23 1213)  SpO2: 96 % (11/10/23 1213) Vital Signs (24h Range):  Temp:  [97.9 °F (36.6 °C)-98.6 °F (37 °C)] 97.9 °F (36.6 °C)  Pulse:  [] 85  Resp:  [17-18] 18  SpO2:  [96 %-98 %] 96 %  BP: (119-145)/(81-89) 132/89      Weight: 86.8 kg (191 lb 5.8 oz)  Body mass index is 23.29 kg/m².     Intake/Output Summary (Last 24 hours) at 11/10/2023 2030  Last data filed at 11/10/2023 1407      Gross per 24 hour   Intake 240 ml   Output 850 ml   Net -610 ml         Physical Exam  Constitutional:       General: He is not in acute distress.  HENT:      Head: Normocephalic.      Comments: Hematoma from fall     Nose: Nose normal.   Eyes:      General: Scleral icterus present.      Extraocular Movements: Extraocular movements intact.      Pupils: Pupils are equal, round, and reactive to light.      Comments: Bruising along the right eye   Cardiovascular:      Rate and Rhythm: Normal rate and regular rhythm.      Heart sounds: No murmur heard.  Pulmonary:      Effort: Pulmonary effort is normal. No respiratory distress.      Breath sounds: Normal breath sounds.   Abdominal:      General: Abdomen is flat. There is no distension.      Palpations: Abdomen is soft.       Tenderness: There is no abdominal tenderness.   Musculoskeletal:         General: Normal range of motion.      Cervical back: Normal range of motion and neck supple.      Right lower leg: No edema.      Left lower leg: No edema.   Skin:     General: Skin is warm.      Coloration: Skin is not jaundiced.      Comments: Scattered petechiae on arms and abdomen, improving   Neurological:      General: No focal deficit present.      Mental Status: He is alert and oriented to person, place, and time.      Cranial Nerves: No cranial nerve deficit.   Psychiatric:         Mood and Affect: Mood normal.         Behavior: Behavior normal.              Goals of Care Treatment Preferences:  Code Status: Full Code          What is most important right now is to focus on curative/life-prolongation (regardless of treatment burdens).  Accordingly, we have decided that the best plan to meet the patient's goals includes continuing with treatment.      Consults:   Consults (From admission, onward)          Status Ordering Provider     Inpatient consult to Ophthalmology  Once        Provider:  (Not yet assigned)    Completed ERI NASH     Inpatient consult to Palliative Care  Once        Provider:  (Not yet assigned)    Completed ERI NASH     Inpatient consult to Hepatology  Once        Provider:  (Not yet assigned)    Completed ERI NASH     Inpatient consult to Psychiatry  Once        Provider:  (Not yet assigned)    Completed ERI NASH     Inpatient consult to Registered Dietitian/Nutritionist  Once        Provider:  (Not yet assigned)    Completed ERI NASH     Inpatient consult to Registered Dietitian/Nutritionist  Once        Provider:  (Not yet assigned)    Completed KT RUTHERFORD            Psychiatric  Alcohol use disorder, severe, dependence  He has been tapered off for alcohol withdrawal with valium per addiction psych. Discussed need for abstinence with patient. Noted he declined  rehab after discharge when discussing with addiction psych. Outpatient therapist was recommended. Will continue multivitamins, thiamine and folate as outpatient.    He was also seen by palliative care . He was noted to have good insight pertaining to the effect that his drinking has effected his body, and how the death of his wife precipitated his excessive alcohol consumption. He noted very clear plans and goals for how intends to move forward, both with his life and sobriety, post-hospitalization, to include following up with a specific addictions psychiatrist locally, whom he has already had informal dialogue and seems to trust significantly.          Renal/  Urge incontinence of urine  Reports urgency and no control and loss of urine   Says he is concerned having no warning signs and having large urine discharge while on condom catheter   Discussed with on call urology resident reporting Bladder scan with < 10ml, UA from 11/4 which was insignificant. Repeat UA obtained before discharge and will refer for outpatient urology evaluation.         Oncology  Pancytopenia  Hb and platelets trended back to June which is when they were normal however similar drop noted in January.     - suspect secondary to prolonged severe nutritional deficiency though other etiologies still considered  - follow up iron/TIBC/ferritin/B6/B12/Folate. Not iron deficient and B12 also appropriate.  Recommend discharge on thiamine and multivitamins and follow up with PCP     GI  * Alcoholic liver disease    Suspect chronic liver disease with superimposed alcoholic hepatitis based on history and pattern of LFT elevations. Maddrey's Discriminant Function for Alcoholic Hepatitis score 15. No glucocorticoid therapy  needed.  -Likely underlying cirrhosis. US Liver with doppler reported Hepatomegaly with hepatic steatosis. Cholecystectomy. Within normal limits liver Doppler evaluation. Obtained serologic workup to rule out secondary causes which  "is negative to date. He has potential to improve with abstinence from alcohol over next several months.  Recommend outpatient follow up. Titrate lactulose and rifaximin.    Will continue multivitamins, thiamine and folate as outpatient.           Other  Nicotine use  Smoked 1 PPD  Was on nicotine patch while in hospital. Offered to continue on discharge however he wants to take 1 thing at a time. He is motivated to seek help. Agreeable to follow outpatient with smoking cessation program. Will give referral on discharge         Final Active Diagnoses:    Diagnosis Date Noted POA    PRINCIPAL PROBLEM:  Alcoholic liver disease [K70.9] 01/31/2023 Yes    Urge incontinence of urine [N39.41] 11/10/2023 No    Nicotine use [Z72.0] 11/10/2023 Yes    Palliative care encounter [Z51.5] 11/07/2023 Not Applicable    Pancytopenia [D61.818] 11/05/2023 Yes    Alcohol use disorder, severe, dependence [F10.20] 01/13/2023 Yes      Problems Resolved During this Admission:       Discharged Condition: stable    Disposition: Home or Self Care    Follow Up:    Patient Instructions:      WALKER FOR HOME USE     Order Specific Question Answer Comments   Type of Walker: Rollator with brakes and/or seat    With wheels? Yes    Height: 6' 4" (1.93 m)    Weight: 86.8 kg (191 lb 5.8 oz)    Length of need (1-99 months): 6    Does patient have medical equipment at home? CPAP    Does patient have medical equipment at home? shower chair    Please check all that apply: Patient is unable to safely ambulate without equipment.    Please check all that apply: Walker will be used for gait training.      Ambulatory referral/consult to Psychology   Standing Status: Future   Referral Priority: Routine Referral Type: Psychiatric   Referral Reason: Specialty Services Required   Requested Specialty: Psychology   Number of Visits Requested: 1     Ambulatory referral/consult to Smoking Cessation Program   Standing Status: Future   Referral Priority: Routine Referral " Type: Consultation   Referral Reason: Specialty Services Required   Requested Specialty: CTTS   Number of Visits Requested: 1     Ambulatory referral/consult to Urology   Standing Status: Future   Referral Priority: Routine Referral Type: Consultation   Referral Reason: Specialty Services Required   Requested Specialty: Urology   Number of Visits Requested: 1     Diet Adult Regular     Order Specific Question Answer Comments   Na restriction, if any: 2gNa      Notify your health care provider if you experience any of the following:  temperature >100.4     Notify your health care provider if you experience any of the following:  difficulty breathing or increased cough     Notify your health care provider if you experience any of the following:  increased confusion or weakness     Reason for not Prescribing Nicotine Replacement   Order Comments: He would like to address 1 issue at a time     Order Specific Question Answer Comments   Reason for not Prescribing: Patient refused        Significant Diagnostic Studies: Labs: CMP   Recent Labs   Lab 11/09/23  0322 11/10/23  0535   * 131*   K 3.5 4.4    102   CO2 24 20*   * 178*   BUN 8 7   CREATININE 0.6 0.5   CALCIUM 7.5* 7.6*   PROT 4.4* 5.1*   ALBUMIN 2.0* 2.2*   BILITOT 10.9* 13.1*   ALKPHOS 267* 319*   * 170*   ALT 56* 65*   ANIONGAP 8 9    and CBC   Recent Labs   Lab 11/09/23  0322 11/09/23  1646 11/10/23  0535   WBC 2.83* 2.70* 3.44*   HGB 7.0* 7.5* 8.7*   HCT 22.0* 24.0* 27.5*   * 134* 158       Pending Diagnostic Studies:       None           Medications:  Reconciled Home Medications:      Medication List        START taking these medications      folic acid 1 MG tablet  Commonly known as: FOLVITE  Take 1 tablet (1 mg total) by mouth once daily.     lactulose 10 gram/15 mL solution  Commonly known as: CHRONULAC  Take 30 mLs (20 g total) by mouth 3 (three) times daily.  TITRATE TO 3-4 BOWEL MOVEMENTS DAILY.     rifAXIMin 550 mg  Tab  Commonly known as: XIFAXAN  Take 1 tablet (550 mg total) by mouth 2 (two) times daily.     thiamine 100 MG tablet  Take 1 tablet (100 mg total) by mouth once daily.            CHANGE how you take these medications      metFORMIN 500 MG ER 24hr tablet  Commonly known as: GLUCOPHAGE-XR  Take 1 tablet (500 mg total) by mouth once daily.  What changed:   how much to take  when to take this            CONTINUE taking these medications      empagliflozin 25 mg tablet  Commonly known as: Jardiance  Take 1 tablet (25 mg total) by mouth once daily.     lisinopriL 20 MG tablet  Commonly known as: PRINIVIL,ZESTRIL  Take 1 tablet (20 mg total) by mouth once daily.            STOP taking these medications      rosuvastatin 10 MG tablet  Commonly known as: CRESTOR              Indwelling Lines/Drains at time of discharge:   Lines/Drains/Airways       Drain  Duration             Male External Urinary Catheter 11/07/23 0800 3 days                    Time spent on the discharge of patient: 45 minutes         Neel Brandon MD  Department of Hospital Medicine  [facility]

## 2023-11-11 NOTE — SUBJECTIVE & OBJECTIVE
Review of Systems   Constitutional:  Positive for fatigue. Negative for chills and fever.   HENT:  Negative for sore throat and trouble swallowing.    Eyes:  Negative for photophobia and visual disturbance.   Respiratory:  Negative for cough and shortness of breath.    Cardiovascular:  Negative for chest pain and palpitations.   Gastrointestinal:  Negative for abdominal distention and abdominal pain.   Genitourinary:  Positive for urgency. Negative for dysuria.   Musculoskeletal:  Positive for myalgias.   Skin:  Negative for color change and rash.   Neurological:  Positive for weakness. Negative for light-headedness and headaches.   Psychiatric/Behavioral:  Negative for confusion and decreased concentration.      Objective:     Vital Signs (Most Recent):  Temp: 97.9 °F (36.6 °C) (11/10/23 1213)  Pulse: 85 (11/10/23 1213)  Resp: 18 (11/10/23 0731)  BP: 132/89 (11/10/23 1213)  SpO2: 96 % (11/10/23 1213) Vital Signs (24h Range):  Temp:  [97.9 °F (36.6 °C)-98.6 °F (37 °C)] 97.9 °F (36.6 °C)  Pulse:  [] 85  Resp:  [17-18] 18  SpO2:  [96 %-98 %] 96 %  BP: (119-145)/(81-89) 132/89     Weight: 86.8 kg (191 lb 5.8 oz)  Body mass index is 23.29 kg/m².    Intake/Output Summary (Last 24 hours) at 11/10/2023 2030  Last data filed at 11/10/2023 1407  Gross per 24 hour   Intake 240 ml   Output 850 ml   Net -610 ml           Physical Exam  Constitutional:       General: He is not in acute distress.  HENT:      Head: Normocephalic.      Comments: Hematoma from fall     Nose: Nose normal.   Eyes:      General: Scleral icterus present.      Extraocular Movements: Extraocular movements intact.      Pupils: Pupils are equal, round, and reactive to light.      Comments: Bruising along the right eye   Cardiovascular:      Rate and Rhythm: Normal rate and regular rhythm.      Heart sounds: No murmur heard.  Pulmonary:      Effort: Pulmonary effort is normal. No respiratory distress.      Breath sounds: Normal breath sounds.    Abdominal:      General: Abdomen is flat. There is no distension.      Palpations: Abdomen is soft.      Tenderness: There is no abdominal tenderness.   Musculoskeletal:         General: Normal range of motion.      Cervical back: Normal range of motion and neck supple.      Right lower leg: No edema.      Left lower leg: No edema.   Skin:     General: Skin is warm.      Coloration: Skin is not jaundiced.      Comments: Scattered petechiae on arms and abdomen, improving   Neurological:      General: No focal deficit present.      Mental Status: He is alert and oriented to person, place, and time.      Cranial Nerves: No cranial nerve deficit.   Psychiatric:         Mood and Affect: Mood normal.         Behavior: Behavior normal.             Significant Labs: All pertinent labs within the past 24 hours have been reviewed.    Significant Imaging: I have reviewed all pertinent imaging results/findings within the past 24 hours.

## 2023-11-11 NOTE — ASSESSMENT & PLAN NOTE
Smoked 1 PPD  Was on nicotine patch while in hospital. Offered to continue on discharge however he wants to take 1 thing at a time. He is motivated to seek help. Agreeable to follow outpatient with smoking cessation program. Will give referral on discharge

## 2023-11-11 NOTE — ASSESSMENT & PLAN NOTE
Reports urgency and no control and loss of urine   Says he is concerned having no warning signs and having large urine discharge while on condom catheter   Discussed with on call urology resident reporting Bladder scan with < 10ml, UA from 11/4 which was insignificant. Repeat UA obtained before discharge and will refer for outpatient urology evaluation.

## 2023-11-11 NOTE — ASSESSMENT & PLAN NOTE
Hb and platelets trended back to June which is when they were normal however similar drop noted in January.     - suspect secondary to prolonged severe nutritional deficiency though other etiologies still considered  - follow up iron/TIBC/ferritin/B6/B12/Folate. Not iron deficient and B12 also appropriate.  Recommend discharge on thiamine and multivitamins and follow up with PCP

## 2023-11-21 ENCOUNTER — PATIENT MESSAGE (OUTPATIENT)
Dept: PRIMARY CARE CLINIC | Facility: CLINIC | Age: 48
End: 2023-11-21

## 2023-11-21 ENCOUNTER — OFFICE VISIT (OUTPATIENT)
Dept: PRIMARY CARE CLINIC | Facility: CLINIC | Age: 48
End: 2023-11-21
Payer: COMMERCIAL

## 2023-11-21 VITALS
DIASTOLIC BLOOD PRESSURE: 80 MMHG | SYSTOLIC BLOOD PRESSURE: 132 MMHG | RESPIRATION RATE: 18 BRPM | BODY MASS INDEX: 22.74 KG/M2 | WEIGHT: 186.75 LBS | HEIGHT: 76 IN | HEART RATE: 94 BPM | OXYGEN SATURATION: 98 % | TEMPERATURE: 97 F

## 2023-11-21 DIAGNOSIS — E72.20 HYPERAMMONEMIA: ICD-10-CM

## 2023-11-21 DIAGNOSIS — Z09 HOSPITAL DISCHARGE FOLLOW-UP: Primary | ICD-10-CM

## 2023-11-21 DIAGNOSIS — K70.10 ALCOHOLIC STEATOHEPATITIS: ICD-10-CM

## 2023-11-21 DIAGNOSIS — R17 JAUNDICE: ICD-10-CM

## 2023-11-21 PROCEDURE — 99999 PR PBB SHADOW E&M-EST. PATIENT-LVL IV: ICD-10-PCS | Mod: PBBFAC,,, | Performed by: STUDENT IN AN ORGANIZED HEALTH CARE EDUCATION/TRAINING PROGRAM

## 2023-11-21 PROCEDURE — 99999 PR PBB SHADOW E&M-EST. PATIENT-LVL IV: CPT | Mod: PBBFAC,,, | Performed by: STUDENT IN AN ORGANIZED HEALTH CARE EDUCATION/TRAINING PROGRAM

## 2023-11-21 PROCEDURE — 99214 OFFICE O/P EST MOD 30 MIN: CPT | Mod: S$PBB,,, | Performed by: STUDENT IN AN ORGANIZED HEALTH CARE EDUCATION/TRAINING PROGRAM

## 2023-11-21 PROCEDURE — 99214 OFFICE O/P EST MOD 30 MIN: CPT | Mod: PBBFAC,PN | Performed by: STUDENT IN AN ORGANIZED HEALTH CARE EDUCATION/TRAINING PROGRAM

## 2023-11-21 PROCEDURE — 99214 PR OFFICE/OUTPT VISIT, EST, LEVL IV, 30-39 MIN: ICD-10-PCS | Mod: S$PBB,,, | Performed by: STUDENT IN AN ORGANIZED HEALTH CARE EDUCATION/TRAINING PROGRAM

## 2023-11-21 NOTE — PROGRESS NOTES
Subjective:       Patient ID: Alberto Song is a 48 y.o. male.    Chief Complaint: Follow-up (Hospital follow up)      HPI:  48 y.o. male presents to [facility] for hospital follow-up visit    Acute concerns?: Patient was on Keto diet during period of sobriety. Target weight was 200, now at 180 lbs. [place] goal and concerned at this point for weight loss.    Patient reports hoda colored stool at this time, which has occurred in past. Is currently taking a probiotic. And taking in plenty of fluids. Appetite is significantly improved. Has discontinued lisinopril. Patient reports now on metformin 500 mg daily.    Patient was seen in ED 11/3/2023 with fall with head trauma, unstable gait, and generalized weakness found to be intoxicated. Was drinking up to 1L hard liquor daily. Patient was recommended outpatient rehab following discharge (declined) and outpatient addiction psychiatrist and therapist. Patient was treated for elevated ammonia levels. Discharged 11/10/2023.    Patient reports significant deconditioning during time of hospitalization. Feels that he has reduced stamina. Patient reports some numbness/tingling to anterior ball of foot that doesn't extend into toes.    Desires PT/OT?: Was offered for at home at time of discharge, feels doesn't desire now, is improving day to day. Is performing ADLs independently.    Will work on exercise at home.    Has appointment with therapist?: Has not scheduled, declines  Has appointment with Addiction Psychiatrist?:  Has not scheduled, declines    No alcohol use since hospital discharge.    Taper of lactulose and rifaximin was recommended following discharge.    Follows with Hepatology?: Will see Dr. London 1/10/2023    No pain to bilateral wrists with irregularity to triquetrum seen on x-ray imaging.    Review of Systems   Constitutional:  Negative for chills, diaphoresis, fatigue and fever.   HENT:  Negative for congestion, sinus pressure, sneezing and sore throat.   "  Respiratory:  Negative for cough and shortness of breath.    Cardiovascular:  Negative for chest pain and palpitations.   Gastrointestinal:  Negative for abdominal pain, diarrhea, nausea and vomiting.   Musculoskeletal:  Negative for joint swelling and myalgias.   Skin:  Positive for wound (Contusions). Negative for rash.   Neurological:  Negative for dizziness and weakness.       Objective:      Vitals:    11/21/23 0924   BP: 132/80   BP Location: Left arm   Patient Position: Sitting   BP Method: Medium (Manual)   Pulse: 94   Resp: 18   Temp: 97 °F (36.1 °C)   TempSrc: Temporal   SpO2: 98%   Weight: 84.7 kg (186 lb 11.7 oz)   Height: 6' 4" (1.93 m)     Physical Exam  Vitals reviewed.   Constitutional:       General: He is not in acute distress.     Appearance: Normal appearance. He is not ill-appearing.   HENT:      Head: Normocephalic and atraumatic.   Eyes:      General: Scleral icterus present.         Right eye: No discharge.         Left eye: No discharge.      Conjunctiva/sclera: Conjunctivae normal.   Cardiovascular:      Rate and Rhythm: Normal rate and regular rhythm.      Heart sounds: Normal heart sounds. No murmur heard.  Pulmonary:      Effort: Pulmonary effort is normal.      Breath sounds: Normal breath sounds.   Abdominal:      General: Abdomen is flat.      Palpations: Abdomen is soft.      Tenderness: There is no abdominal tenderness.   Musculoskeletal:         General: No deformity.   Skin:     General: Skin is warm and dry.      Coloration: Skin is not jaundiced.   Neurological:      General: No focal deficit present.      Mental Status: He is alert and oriented to person, place, and time.   Psychiatric:         Mood and Affect: Mood normal.         Behavior: Behavior normal.             Lab Results   Component Value Date     (L) 11/10/2023    K 4.4 11/10/2023     11/10/2023    CO2 20 (L) 11/10/2023    BUN 7 11/10/2023    CREATININE 0.5 11/10/2023    GLUCOSE 132 (H) 09/04/2022    " "ANIONGAP 9 11/10/2023     Lab Results   Component Value Date    HGBA1C 5.7 (H) 11/04/2023     No results found for: "BNP", "BNPTRIAGEBLO"    Lab Results   Component Value Date    WBC 3.44 (L) 11/10/2023    HGB 8.7 (L) 11/10/2023    HCT 27.5 (L) 11/10/2023    HCT 25 (L) 11/04/2023     11/10/2023    GRAN 1.3 (L) 11/10/2023    GRAN 36.5 (L) 11/10/2023     Lab Results   Component Value Date    CHOL 173 06/08/2023    HDL 42 06/08/2023    LDLCALC 117.2 06/08/2023    TRIG 69 06/08/2023          Current Outpatient Medications:     empagliflozin (JARDIANCE) 25 mg tablet, Take 1 tablet (25 mg total) by mouth once daily., Disp: 30 tablet, Rfl: 2    metFORMIN (GLUCOPHAGE-XR) 500 MG ER 24hr tablet, Take 1 tablet (500 mg total) by mouth once daily., Disp: 30 tablet, Rfl: 0    thiamine 100 MG tablet, Take 1 tablet (100 mg total) by mouth once daily. (Patient taking differently: Take 200 mg by mouth once daily.), Disp: 30 tablet, Rfl: 2    folic acid (FOLVITE) 1 MG tablet, Take 1 tablet (1 mg total) by mouth once daily. (Patient not taking: Reported on 11/21/2023), Disp: 30 tablet, Rfl: 2    lisinopriL (PRINIVIL,ZESTRIL) 20 MG tablet, Take 1 tablet (20 mg total) by mouth once daily. (Patient not taking: Reported on 11/21/2023), Disp: 30 tablet, Rfl: 2    rifAXIMin (XIFAXAN) 550 mg Tab, Take 1 tablet (550 mg total) by mouth 2 (two) times daily. (Patient not taking: Reported on 11/21/2023), Disp: 60 tablet, Rfl: 2  No current facility-administered medications for this visit.        Assessment:       1. Hospital discharge follow-up    2. Alcoholic steatohepatitis    3. Jaundice    4. Hyperammonemia           Plan:       Hospital discharge follow-up  Alcoholic steatohepatitis  Jaundice  Hyperammonemia  -     Ammonia; Future; Expected date: 11/21/2023  -     Comprehensive Metabolic Panel; Future; Expected date: 11/21/2023  -     BILIRUBIN, DIRECT; Future; Expected date: 11/21/2023  -     Magnesium; Future; Expected date: " 11/21/2023  - Will repeat blood work at this time to include ammonia and bilirubin levels  - Keep follow-up with Hepatology 1/10/2023    RTC in 4 weeks

## 2023-12-04 ENCOUNTER — TELEPHONE (OUTPATIENT)
Dept: ADMINISTRATIVE | Facility: OTHER | Age: 48
End: 2023-12-04
Payer: COMMERCIAL

## 2023-12-04 ENCOUNTER — PATIENT MESSAGE (OUTPATIENT)
Dept: ADMINISTRATIVE | Facility: OTHER | Age: 48
End: 2023-12-04
Payer: COMMERCIAL

## 2023-12-04 ENCOUNTER — OFFICE VISIT (OUTPATIENT)
Dept: HEPATOLOGY | Facility: CLINIC | Age: 48
End: 2023-12-04
Payer: COMMERCIAL

## 2023-12-04 VITALS
HEIGHT: 76 IN | OXYGEN SATURATION: 99 % | WEIGHT: 184 LBS | BODY MASS INDEX: 22.41 KG/M2 | RESPIRATION RATE: 19 BRPM | HEART RATE: 99 BPM | DIASTOLIC BLOOD PRESSURE: 91 MMHG | SYSTOLIC BLOOD PRESSURE: 120 MMHG

## 2023-12-04 DIAGNOSIS — K70.30 ALCOHOLIC CIRRHOSIS OF LIVER WITHOUT ASCITES: ICD-10-CM

## 2023-12-04 DIAGNOSIS — R63.4 ABNORMAL INTENTIONAL WEIGHT LOSS: ICD-10-CM

## 2023-12-04 DIAGNOSIS — F10.20 ALCOHOL USE DISORDER, SEVERE, DEPENDENCE: Primary | ICD-10-CM

## 2023-12-04 PROCEDURE — 99204 OFFICE O/P NEW MOD 45 MIN: CPT | Mod: S$GLB,,, | Performed by: INTERNAL MEDICINE

## 2023-12-04 PROCEDURE — 99999 PR PBB SHADOW E&M-EST. PATIENT-LVL V: CPT | Mod: PBBFAC,,, | Performed by: INTERNAL MEDICINE

## 2023-12-04 PROCEDURE — 99999 PR PBB SHADOW E&M-EST. PATIENT-LVL V: ICD-10-PCS | Mod: PBBFAC,,, | Performed by: INTERNAL MEDICINE

## 2023-12-04 PROCEDURE — 99204 PR OFFICE/OUTPT VISIT, NEW, LEVL IV, 45-59 MIN: ICD-10-PCS | Mod: S$GLB,,, | Performed by: INTERNAL MEDICINE

## 2023-12-04 NOTE — PATIENT INSTRUCTIONS
IOP information- pt to call psych dept  Labs now and every 2 weeks  60 gm protein daily (whey protein supplement) and will send to dietician  MRI carlos  Return 3 months in [place]

## 2023-12-04 NOTE — PROGRESS NOTES
HEPATOLOGY CONSULTATION    Referring Physician:   Current Corresponding Physician:     Reason for Consultation: Consultation for evaluation of elevated liver enzymes    History of Present Illness: Alberto Song is a 48 y.o. malewho presents for evaluation of elevated liver tests:    Labs 11/21/23: ALT 70, AST 38, ALKP 145, Tbil 1.1  PRISCILA-, ASMA-, AMA-, IgG not elevated, viral hep serologies not done    Abdo US w doppler 11/5/23: Hepatomegaly with hepatic steatosis.     --stopped drinking 11/4 or 11/5/23  --drank heavily ~2-3 years  -[facility] detox 10/22-stopped x 4-6 months after 28 day inpt rehab    --cholecystectomy removed 02/23    The patient denies any symptoms of decompensated cirrhosis, including no ascites or edema, cognitive problems that would suggest hepatic encephalopathy, or GI bleeding from varices.     MELD 3.0: 23 at 11/10/2023  5:35 AM  MELD-Na: 22 at 11/10/2023  5:35 AM  Calculated from:  Serum Creatinine: 0.5 mg/dL (Using min of 1 mg/dL) at 11/10/2023  5:35 AM  Serum Sodium: 131 mmol/L at 11/10/2023  5:35 AM  Total Bilirubin: 13.1 mg/dL at 11/10/2023  5:35 AM  Serum Albumin: 2.2 g/dL at 11/10/2023  5:35 AM  INR(ratio): 1.2 at 11/10/2023  5:35 AM  Age at listing (hypothetical): 48 years  Sex: Male at 11/10/2023  5:35 AM       Chief Complaint   Patient presents with    Elevated Hepatic Enzymes    Cirrhosis       Past Medical History:   Diagnosis Date    Asthma     Diabetes mellitus     Hyperlipidemia     Hypertension     Sleep apnea, unspecified     Type 2 diabetes mellitus without complication, without long-term current use of insulin 6/6/2023     Outpatient Encounter Medications as of 12/4/2023   Medication Sig Dispense Refill    empagliflozin (JARDIANCE) 25 mg tablet Take 1 tablet (25 mg total) by mouth once daily. 30 tablet 2    lisinopriL (PRINIVIL,ZESTRIL) 20 MG tablet Take 1 tablet (20 mg total) by mouth once daily. 30 tablet 2    metFORMIN (GLUCOPHAGE-XR) 500 MG ER 24hr tablet Take 1 tablet (500  mg total) by mouth once daily. 30 tablet 0    thiamine 100 MG tablet Take 1 tablet (100 mg total) by mouth once daily. (Patient taking differently: Take 200 mg by mouth once daily.) 30 tablet 2    folic acid (FOLVITE) 1 MG tablet Take 1 tablet (1 mg total) by mouth once daily. (Patient not taking: Reported on 11/21/2023) 30 tablet 2    rifAXIMin (XIFAXAN) 550 mg Tab Take 1 tablet (550 mg total) by mouth 2 (two) times daily. (Patient not taking: Reported on 11/21/2023) 60 tablet 2     No facility-administered encounter medications on file as of 12/4/2023.     Review of patient's allergies indicates:  No Known Allergies  Family History   Problem Relation Age of Onset    Cancer Mother     Early death Mother     Hypertension Father        Social History     Socioeconomic History    Marital status: Single   Tobacco Use    Smoking status: Every Day     Current packs/day: 1.00     Average packs/day: 1 pack/day for 32.0 years (32.0 ttl pk-yrs)     Types: Cigarettes    Smokeless tobacco: Never   Substance and Sexual Activity    Alcohol use: Not Currently     Comment: One 750ml per day    Drug use: Never    Sexual activity: Not Currently     Partners: Female     Birth control/protection: None   Social History Narrative    ** Merged History Encounter **          Social Determinants of Health     Financial Resource Strain: Unknown (11/6/2023)    Overall Financial Resource Strain (CARDIA)     Difficulty of Paying Living Expenses: Patient refused   Food Insecurity: Unknown (11/6/2023)    Hunger Vital Sign     Worried About Running Out of Food in the Last Year: Patient refused     Ran Out of Food in the Last Year: Patient refused   Transportation Needs: Unknown (11/6/2023)    PRAPARE - Transportation     Lack of Transportation (Medical): Patient refused     Lack of Transportation (Non-Medical): Patient refused   Physical Activity: Unknown (11/6/2023)    Exercise Vital Sign     Days of Exercise per Week: Patient refused     Minutes  of Exercise per Session: Patient refused   Stress: Unknown (11/6/2023)    [employer] [place] [employer] - Occupational Stress Questionnaire     Feeling of Stress : Patient refused   Social Connections: Unknown (11/6/2023)    Social Connection and Isolation Panel [NHANES]     Frequency of Communication with Friends and Family: Patient refused     Frequency of Social Gatherings with Friends and Family: Patient refused     Attends [employer] Services: Patient refused     Active Member of Clubs or Organizations: Patient refused     Attends Club or Organization Meetings: Patient refused     Marital Status: Patient refused   Housing Stability: Unknown (11/6/2023)    Housing Stability Vital Sign     Unable to Pay for Housing in the Last Year: Patient refused     Number of Places Lived in the Last Year: 1     Unstable Housing in the Last Year: Patient refused     Review of Systems   Constitutional: Negative.    HENT: Negative.     Eyes: Negative.    Respiratory: Negative.     Cardiovascular: Negative.    Gastrointestinal: Negative.    Genitourinary: Negative.    Musculoskeletal: Negative.    Skin: Negative.    Neurological: Negative.    Psychiatric/Behavioral: Negative.       Vitals:    12/04/23 1056   BP: (!) 120/91   Pulse: 99   Resp: 19       Physical Exam  Vitals reviewed.   Constitutional:       Appearance: He is well-developed.   HENT:      Head: Normocephalic and atraumatic.   Eyes:      General: No scleral icterus.     Conjunctiva/sclera: Conjunctivae normal.      Pupils: Pupils are equal, round, and reactive to light.   Neck:      Thyroid: No thyromegaly.   Cardiovascular:      Rate and Rhythm: Normal rate and regular rhythm.      Heart sounds: Normal heart sounds.   Pulmonary:      Effort: Pulmonary effort is normal.      Breath sounds: Normal breath sounds. No rales.   Abdominal:      General: Bowel sounds are normal. There is no distension.      Palpations: Abdomen is soft. There is no mass.       Tenderness: There is no abdominal tenderness.   Musculoskeletal:         General: Normal range of motion.      Cervical back: Normal range of motion and neck supple.   Skin:     General: Skin is warm and dry.      Findings: No rash.   Neurological:      Mental Status: He is alert and oriented to person, place, and time.         MELD 3.0: 23 at 11/10/2023  5:35 AM  MELD-Na: 22 at 11/10/2023  5:35 AM  Calculated from:  Serum Creatinine: 0.5 mg/dL (Using min of 1 mg/dL) at 11/10/2023  5:35 AM  Serum Sodium: 131 mmol/L at 11/10/2023  5:35 AM  Total Bilirubin: 13.1 mg/dL at 11/10/2023  5:35 AM  Serum Albumin: 2.2 g/dL at 11/10/2023  5:35 AM  INR(ratio): 1.2 at 11/10/2023  5:35 AM  Age at listing (hypothetical): 48 years  Sex: Male at 11/10/2023  5:35 AM      Lab Results   Component Value Date     (H) 11/21/2023    BUN 6 11/21/2023    CREATININE 0.7 11/21/2023    CALCIUM 8.6 (L) 11/21/2023     11/21/2023    K 3.3 (L) 11/21/2023     11/21/2023    PROT 6.4 11/21/2023    CO2 24 11/21/2023    ANIONGAP 11 11/21/2023    WBC 4.81 11/21/2023    RBC 3.59 (L) 11/21/2023    HGB 12.7 (L) 11/21/2023    HCT 41.0 11/21/2023    HCT 25 (L) 11/04/2023     (H) 11/21/2023    MCH 35.4 (H) 11/21/2023    MCHC 31.0 (L) 11/21/2023     Lab Results   Component Value Date    RDW 14.2 11/21/2023     11/21/2023    MPV 10.1 11/21/2023    GRAN 2.6 11/21/2023    GRAN 54.2 11/21/2023    LYMPH 1.5 11/21/2023    LYMPH 30.8 11/21/2023    MONO 0.5 11/21/2023    MONO 10.0 11/21/2023    EOSINOPHIL 2.1 11/21/2023    BASOPHIL 2.1 (H) 11/21/2023    EOS 0.1 11/21/2023    BASO 0.10 11/21/2023    APTT 26.2 11/05/2023    CHOL 173 06/08/2023    TRIG 69 06/08/2023    HDL 42 06/08/2023    CHOLHDL 24.3 06/08/2023    TOTALCHOLEST 4.1 06/08/2023    ALBUMIN 2.7 (L) 11/21/2023    BILIDIR 6.7 (H) 11/21/2023     (H) 11/21/2023    ALT 70 (H) 11/21/2023    ALKPHOS 311 (H) 11/21/2023    MG 1.9 11/21/2023    LABPROT 12.4 11/10/2023    INR 1.2  11/10/2023       Assessment and Plan:  Alberto Song is a 48 y.o. male with elevated liver enzymes and a hx of alcohol abuse. Liver tests likely elevated due to alcohol. Recommend stay off alcohol; consider IOP. Complete serologic w/u for elevated liver tests; MRI abdo. Labs now and every 2 weeks. Increase protein intake and to see dietician.    Return 3 months in [facility].  A total of 35 minutes was spent reviewing the patient's chart, examining the patient, reviewing labs and imaging and coordinating care with the patient's care team.

## 2023-12-04 NOTE — TELEPHONE ENCOUNTER
YAIR with scheduled Nutrition appointment of 1-5-2024, and contact number provided for any questions. My Chart message to be sent.

## 2023-12-05 ENCOUNTER — TELEPHONE (OUTPATIENT)
Dept: HEPATOLOGY | Facility: CLINIC | Age: 48
End: 2023-12-05
Payer: COMMERCIAL

## 2023-12-05 NOTE — TELEPHONE ENCOUNTER
I called  Alberto about his MRI, he states is currently on the line with his insurance to see where he can have the mri

## 2023-12-06 ENCOUNTER — TELEPHONE (OUTPATIENT)
Dept: HEPATOLOGY | Facility: CLINIC | Age: 48
End: 2023-12-06
Payer: COMMERCIAL

## 2023-12-06 NOTE — TELEPHONE ENCOUNTER
----- Message from Khalif Pace sent at 12/5/2023  4:36 PM CST -----  Type:  Patient Returning Call    Who Called:pt  Who Left Message for Patient:  Does the patient know what this is regarding?:orders  Would the patient rather a call back or a response via [facility]? call  Best Call Back Number:[phone]  Additional Information: pt needs orders extended for blood test that's scheduled for 12/18 and 01/02 for Phosphatidylethanol (PETH)  pt has to move those appts for the blood test to February due to insurance. Please give the pt an call back to advise

## 2023-12-21 ENCOUNTER — TELEPHONE (OUTPATIENT)
Dept: PRIMARY CARE CLINIC | Facility: CLINIC | Age: 48
End: 2023-12-21
Payer: COMMERCIAL

## 2023-12-21 ENCOUNTER — TELEPHONE (OUTPATIENT)
Dept: HEPATOLOGY | Facility: CLINIC | Age: 48
End: 2023-12-21
Payer: COMMERCIAL

## 2023-12-21 DIAGNOSIS — K76.9 LIVER DISEASE, UNSPECIFIED: Primary | ICD-10-CM

## 2023-12-21 DIAGNOSIS — F10.20 ALCOHOL USE DISORDER, SEVERE, DEPENDENCE: ICD-10-CM

## 2023-12-21 DIAGNOSIS — R63.4 ABNORMAL INTENTIONAL WEIGHT LOSS: ICD-10-CM

## 2023-12-21 NOTE — TELEPHONE ENCOUNTER
Pt called to reschd MRI to a sooner appt Pt reschd to [facility] but his insurance is out of network. Advised pt to consult his ins carrier for in net work facility. Pt states he is aware ins is out of network so he changed back to [facility]. Pt advised to upload a copy of his new card to be verified and to see if a PA is warranted for MRI. Pt verbalized understanding and will get it done. KVNG BAILEY

## 2023-12-21 NOTE — TELEPHONE ENCOUNTER
----- Message from Elise Mathias sent at 12/21/2023 10:52 AM CST -----  Regarding: reschedule schedule,  Contact: alberto  Caller: Alberto    Contact: [phone] (home)       Pt calling to reschedule appointment on 02/02/2024. Please advise. Requesting a call back.

## 2023-12-21 NOTE — TELEPHONE ENCOUNTER
----- Message from Elise Mathias sent at 12/21/2023 10:50 AM CST -----  Regarding: reschedule appt  Contact: Alberto    Caller: Alberto    Contact: [phone] (home)       Pt calling to reschedule appointment on 02/02/2024. Please advise. Requesting a call back.

## 2023-12-27 ENCOUNTER — OFFICE VISIT (OUTPATIENT)
Dept: PRIMARY CARE CLINIC | Facility: CLINIC | Age: 48
End: 2023-12-27
Payer: COMMERCIAL

## 2023-12-27 ENCOUNTER — PATIENT MESSAGE (OUTPATIENT)
Dept: PRIMARY CARE CLINIC | Facility: CLINIC | Age: 48
End: 2023-12-27

## 2023-12-27 ENCOUNTER — HOSPITAL ENCOUNTER (OUTPATIENT)
Dept: RADIOLOGY | Facility: HOSPITAL | Age: 48
Discharge: HOME | End: 2023-12-27
Attending: INTERNAL MEDICINE
Payer: COMMERCIAL

## 2023-12-27 VITALS
OXYGEN SATURATION: 98 % | TEMPERATURE: 98 F | RESPIRATION RATE: 18 BRPM | SYSTOLIC BLOOD PRESSURE: 138 MMHG | DIASTOLIC BLOOD PRESSURE: 70 MMHG | WEIGHT: 198.44 LBS | HEIGHT: 76 IN | HEART RATE: 98 BPM | BODY MASS INDEX: 24.16 KG/M2

## 2023-12-27 DIAGNOSIS — E11.9 TYPE 2 DIABETES MELLITUS WITHOUT COMPLICATION, WITHOUT LONG-TERM CURRENT USE OF INSULIN: ICD-10-CM

## 2023-12-27 DIAGNOSIS — F10.20 ALCOHOL USE DISORDER, SEVERE, DEPENDENCE: ICD-10-CM

## 2023-12-27 DIAGNOSIS — Z00.00 ANNUAL PHYSICAL EXAM: Primary | ICD-10-CM

## 2023-12-27 DIAGNOSIS — R63.4 ABNORMAL INTENTIONAL WEIGHT LOSS: ICD-10-CM

## 2023-12-27 PROCEDURE — 99396 PREV VISIT EST AGE 40-64: CPT | Mod: S$GLB,,, | Performed by: STUDENT IN AN ORGANIZED HEALTH CARE EDUCATION/TRAINING PROGRAM

## 2023-12-27 PROCEDURE — 3066F PR DOCUMENTATION OF TREATMENT FOR NEPHROPATHY: ICD-10-PCS | Mod: CPTII,S$GLB,, | Performed by: STUDENT IN AN ORGANIZED HEALTH CARE EDUCATION/TRAINING PROGRAM

## 2023-12-27 PROCEDURE — 3061F NEG MICROALBUMINURIA REV: CPT | Mod: CPTII,S$GLB,, | Performed by: STUDENT IN AN ORGANIZED HEALTH CARE EDUCATION/TRAINING PROGRAM

## 2023-12-27 PROCEDURE — 99999 PR PBB SHADOW E&M-EST. PATIENT-LVL IV: ICD-10-PCS | Mod: PBBFAC,,, | Performed by: STUDENT IN AN ORGANIZED HEALTH CARE EDUCATION/TRAINING PROGRAM

## 2023-12-27 PROCEDURE — 25500020 PHARM REV CODE 255

## 2023-12-27 PROCEDURE — 99999 PR PBB SHADOW E&M-EST. PATIENT-LVL IV: CPT | Mod: PBBFAC,,, | Performed by: STUDENT IN AN ORGANIZED HEALTH CARE EDUCATION/TRAINING PROGRAM

## 2023-12-27 PROCEDURE — 3061F PR NEG MICROALBUMINURIA RESULT DOCUMENTED/REVIEW: ICD-10-PCS | Mod: CPTII,S$GLB,, | Performed by: STUDENT IN AN ORGANIZED HEALTH CARE EDUCATION/TRAINING PROGRAM

## 2023-12-27 PROCEDURE — 99396 PR PREVENTIVE VISIT,EST,40-64: ICD-10-PCS | Mod: S$GLB,,, | Performed by: STUDENT IN AN ORGANIZED HEALTH CARE EDUCATION/TRAINING PROGRAM

## 2023-12-27 PROCEDURE — 3008F PR BODY MASS INDEX (BMI) DOCUMENTED: ICD-10-PCS | Mod: CPTII,S$GLB,, | Performed by: STUDENT IN AN ORGANIZED HEALTH CARE EDUCATION/TRAINING PROGRAM

## 2023-12-27 PROCEDURE — 3078F DIAST BP <80 MM HG: CPT | Mod: CPTII,S$GLB,, | Performed by: STUDENT IN AN ORGANIZED HEALTH CARE EDUCATION/TRAINING PROGRAM

## 2023-12-27 PROCEDURE — 74183 MRI ABD W/O CNTR FLWD CNTR: CPT | Mod: TC

## 2023-12-27 PROCEDURE — 3044F HG A1C LEVEL LT 7.0%: CPT | Mod: CPTII,S$GLB,, | Performed by: STUDENT IN AN ORGANIZED HEALTH CARE EDUCATION/TRAINING PROGRAM

## 2023-12-27 PROCEDURE — 74183 MRI ABD W/O CNTR FLWD CNTR: CPT | Mod: 26,,, | Performed by: RADIOLOGY

## 2023-12-27 PROCEDURE — 1160F RVW MEDS BY RX/DR IN RCRD: CPT | Mod: CPTII,S$GLB,, | Performed by: STUDENT IN AN ORGANIZED HEALTH CARE EDUCATION/TRAINING PROGRAM

## 2023-12-27 PROCEDURE — 3008F BODY MASS INDEX DOCD: CPT | Mod: CPTII,S$GLB,, | Performed by: STUDENT IN AN ORGANIZED HEALTH CARE EDUCATION/TRAINING PROGRAM

## 2023-12-27 PROCEDURE — 1159F PR MEDICATION LIST DOCUMENTED IN MEDICAL RECORD: ICD-10-PCS | Mod: CPTII,S$GLB,, | Performed by: STUDENT IN AN ORGANIZED HEALTH CARE EDUCATION/TRAINING PROGRAM

## 2023-12-27 PROCEDURE — 3066F NEPHROPATHY DOC TX: CPT | Mod: CPTII,S$GLB,, | Performed by: STUDENT IN AN ORGANIZED HEALTH CARE EDUCATION/TRAINING PROGRAM

## 2023-12-27 PROCEDURE — 4010F PR ACE/ARB THEARPY RXD/TAKEN: ICD-10-PCS | Mod: CPTII,S$GLB,, | Performed by: STUDENT IN AN ORGANIZED HEALTH CARE EDUCATION/TRAINING PROGRAM

## 2023-12-27 PROCEDURE — 3078F PR MOST RECENT DIASTOLIC BLOOD PRESSURE < 80 MM HG: ICD-10-PCS | Mod: CPTII,S$GLB,, | Performed by: STUDENT IN AN ORGANIZED HEALTH CARE EDUCATION/TRAINING PROGRAM

## 2023-12-27 PROCEDURE — 74183 MRI ABDOMEN W WO CONTRAST: ICD-10-PCS | Mod: 26,,, | Performed by: RADIOLOGY

## 2023-12-27 PROCEDURE — 3075F PR MOST RECENT SYSTOLIC BLOOD PRESS GE 130-139MM HG: ICD-10-PCS | Mod: CPTII,S$GLB,, | Performed by: STUDENT IN AN ORGANIZED HEALTH CARE EDUCATION/TRAINING PROGRAM

## 2023-12-27 PROCEDURE — A9585 GADOBUTROL INJECTION: HCPCS

## 2023-12-27 PROCEDURE — 1160F PR REVIEW ALL MEDS BY PRESCRIBER/CLIN PHARMACIST DOCUMENTED: ICD-10-PCS | Mod: CPTII,S$GLB,, | Performed by: STUDENT IN AN ORGANIZED HEALTH CARE EDUCATION/TRAINING PROGRAM

## 2023-12-27 PROCEDURE — 3075F SYST BP GE 130 - 139MM HG: CPT | Mod: CPTII,S$GLB,, | Performed by: STUDENT IN AN ORGANIZED HEALTH CARE EDUCATION/TRAINING PROGRAM

## 2023-12-27 PROCEDURE — 3044F PR MOST RECENT HEMOGLOBIN A1C LEVEL <7.0%: ICD-10-PCS | Mod: CPTII,S$GLB,, | Performed by: STUDENT IN AN ORGANIZED HEALTH CARE EDUCATION/TRAINING PROGRAM

## 2023-12-27 PROCEDURE — 4010F ACE/ARB THERAPY RXD/TAKEN: CPT | Mod: CPTII,S$GLB,, | Performed by: STUDENT IN AN ORGANIZED HEALTH CARE EDUCATION/TRAINING PROGRAM

## 2023-12-27 PROCEDURE — 1159F MED LIST DOCD IN RCRD: CPT | Mod: CPTII,S$GLB,, | Performed by: STUDENT IN AN ORGANIZED HEALTH CARE EDUCATION/TRAINING PROGRAM

## 2023-12-27 RX ORDER — GADOBUTROL 604.72 MG/ML
INJECTION INTRAVENOUS
Status: COMPLETED
Start: 2023-12-27 | End: 2023-12-27

## 2023-12-27 RX ORDER — METFORMIN HYDROCHLORIDE 500 MG/1
1000 TABLET, EXTENDED RELEASE ORAL
Qty: 180 TABLET | Refills: 3 | Status: SHIPPED | OUTPATIENT
Start: 2023-12-27 | End: 2024-01-19 | Stop reason: SDUPTHER

## 2023-12-27 RX ADMIN — GADOBUTROL 9 ML: 604.72 INJECTION INTRAVENOUS at 06:12

## 2023-12-27 NOTE — PROGRESS NOTES
"Subjective:       Patient ID: Alberto Song is a 48 y.o. male.    Chief Complaint: Follow-up    HPI:  48 y.o. male presents to [facility] for follow-up visit    Acute concerns?: Patient reports doing well since last visit without concerns. Holidays went well, but kids where were coming sick.    Any alcohol usage since last seen in clinic?: None since last seen in clinic.    No current programs currently. Patient was in [facility] detox 1 year ago.    Has some spirituality. Only 12 step intervention was during [facility] detox.    No known family history of MEN or thyroid cancer. Has had pancreatitis in the past.    Most recent HbA1c: 5.7% 11/4/2023  Home Reads?: Have been mid to high 200s recently  Current Meds: Jardiance 25 mg, metformin 1500 mg daily  On Insulin?: Levemir 16 U daily  Compliance: Not missing    Review of Systems   Constitutional:  Negative for chills, diaphoresis, fatigue and fever.   HENT:  Negative for congestion, sinus pressure, sneezing and sore throat.    Respiratory:  Negative for cough and shortness of breath.    Cardiovascular:  Negative for chest pain and palpitations.   Gastrointestinal:  Negative for abdominal pain, diarrhea, nausea and vomiting.   Skin:  Negative for rash and wound.   Neurological:  Negative for dizziness, light-headedness and headaches.       Objective:      Vitals:    12/27/23 1426   BP: 138/70   BP Location: Right arm   Patient Position: Sitting   BP Method: Medium (Manual)   Pulse: 98   Resp: 18   Temp: 97.9 °F (36.6 °C)   SpO2: 98%   Weight: 90 kg (198 lb 6.6 oz)   Height: 6' 4" (1.93 m)     Physical Exam  Vitals reviewed.   Constitutional:       General: He is not in acute distress.     Appearance: Normal appearance. He is not ill-appearing.   HENT:      Head: Normocephalic and atraumatic.   Eyes:      General:         Right eye: No discharge.         Left eye: No discharge.   Cardiovascular:      Rate and Rhythm: Normal rate and regular rhythm.      Pulses: Normal pulses. " "     Heart sounds: No murmur heard.  Pulmonary:      Effort: Pulmonary effort is normal.      Breath sounds: Normal breath sounds.   Abdominal:      Palpations: Abdomen is soft.      Tenderness: There is no abdominal tenderness.   Musculoskeletal:         General: No deformity.      Cervical back: Neck supple. No rigidity.   Lymphadenopathy:      Cervical: No cervical adenopathy.   Skin:     General: Skin is warm and dry.      Coloration: Skin is not jaundiced.   Neurological:      General: No focal deficit present.      Mental Status: He is alert and oriented to person, place, and time.   Psychiatric:         Mood and Affect: Mood normal.         Behavior: Behavior normal.             Lab Results   Component Value Date     12/22/2023    K 4.3 12/22/2023     12/22/2023    CO2 23 12/22/2023    BUN 11 12/22/2023    CREATININE 0.7 12/22/2023    GLUCOSE 132 (H) 09/04/2022    ANIONGAP 9 12/22/2023     Lab Results   Component Value Date    HGBA1C 5.7 (H) 11/04/2023     No results found for: "BNP", "BNPTRIAGEBLO"    Lab Results   Component Value Date    WBC 5.19 12/22/2023    HGB 12.8 (L) 12/22/2023    HCT 40.3 12/22/2023    HCT 25 (L) 11/04/2023     12/22/2023    GRAN 2.2 12/22/2023    GRAN 41.9 12/22/2023     Lab Results   Component Value Date    CHOL 173 06/08/2023    HDL 42 06/08/2023    LDLCALC 117.2 06/08/2023    TRIG 69 06/08/2023          Current Outpatient Medications:     lisinopriL (PRINIVIL,ZESTRIL) 20 MG tablet, Take 1 tablet (20 mg total) by mouth once daily., Disp: 30 tablet, Rfl: 2    blood-glucose calibrat control Cmpk, 1 each by Misc.(Non-Drug; Combo Route) route every 6 (six) months., Disp: 1 each, Rfl: 1    empagliflozin (JARDIANCE) 25 mg tablet, Take 1 tablet (25 mg total) by mouth once daily., Disp: 90 tablet, Rfl: 3    folic acid (FOLVITE) 1 MG tablet, Take 1 tablet (1 mg total) by mouth once daily. (Patient not taking: Reported on 11/21/2023), Disp: 30 tablet, Rfl: 2    metFORMIN " (GLUCOPHAGE-XR) 500 MG ER 24hr tablet, Take 2 tablets (1,000 mg total) by mouth daily with breakfast., Disp: 180 tablet, Rfl: 3    rifAXIMin (XIFAXAN) 550 mg Tab, Take 1 tablet (550 mg total) by mouth 2 (two) times daily. (Patient not taking: Reported on 11/21/2023), Disp: 60 tablet, Rfl: 2    thiamine 100 MG tablet, Take 1 tablet (100 mg total) by mouth once daily. (Patient not taking: Reported on 12/27/2023), Disp: 30 tablet, Rfl: 2        Assessment:       1. Annual physical exam    2. Type 2 diabetes mellitus without complication, without long-term current use of insulin           Plan:       Annual physical exam  Type 2 diabetes mellitus without complication, without long-term current use of insulin  -     metFORMIN (GLUCOPHAGE-XR) 500 MG ER 24hr tablet; Take 2 tablets (1,000 mg total) by mouth daily with breakfast.  Dispense: 180 tablet; Refill: 3  -     Ambulatory referral/consult to Endocrinology; Future; Expected date: 01/03/2024  -     empagliflozin (JARDIANCE) 25 mg tablet; Take 1 tablet (25 mg total) by mouth once daily.  Dispense: 90 tablet; Refill: 3  -     blood-glucose calibrat control Cmpk; 1 each by Misc.(Non-Drug; Combo Route) route every 6 (six) months.  Dispense: 1 each; Refill: 1  -     POCT Glucose, Hand-Held Device  -     Glutamic acid decarboxylase; Future; Expected date: 12/27/2023  -     C-PEPTIDE; Future; Expected date: 12/27/2023  -     INSULIN, RANDOM; Future; Expected date: 12/27/2023  -     INSULIN ANTIBODY; Future; Expected date: 12/27/2023  - With drastic increase in fasting glucose from recent A1c, will screen for latent autoimmune DM and refer to DM  - Will increase insulin glargine to 18 units daily at this time    RTC in 2 months

## 2023-12-28 DIAGNOSIS — K86.9 LESION OF PANCREAS: Primary | ICD-10-CM

## 2023-12-29 ENCOUNTER — HOSPITAL ENCOUNTER (OUTPATIENT)
Dept: RADIOLOGY | Facility: OTHER | Age: 48
Discharge: HOME | End: 2023-12-29
Attending: INTERNAL MEDICINE
Payer: COMMERCIAL

## 2023-12-29 ENCOUNTER — TELEPHONE (OUTPATIENT)
Dept: HEPATOLOGY | Facility: CLINIC | Age: 48
End: 2023-12-29
Payer: COMMERCIAL

## 2023-12-29 DIAGNOSIS — K86.9 LESION OF PANCREAS: ICD-10-CM

## 2023-12-29 PROCEDURE — 74170 CT ABD WO CNTRST FLWD CNTRST: CPT | Mod: TC

## 2023-12-29 PROCEDURE — 74170 CT ABD WO CNTRST FLWD CNTRST: CPT | Mod: 26,,, | Performed by: STUDENT IN AN ORGANIZED HEALTH CARE EDUCATION/TRAINING PROGRAM

## 2023-12-29 PROCEDURE — 25500020 PHARM REV CODE 255: Performed by: INTERNAL MEDICINE

## 2023-12-29 PROCEDURE — 74170 CT ABDOMEN W WO CONTRAST: ICD-10-PCS | Mod: 26,,, | Performed by: STUDENT IN AN ORGANIZED HEALTH CARE EDUCATION/TRAINING PROGRAM

## 2023-12-29 RX ADMIN — IOHEXOL 75 ML: 350 INJECTION, SOLUTION INTRAVENOUS at 05:12

## 2023-12-29 NOTE — TELEPHONE ENCOUNTER
----- Message from Harini London MD sent at 12/28/2023  4:32 PM CST -----  Needs ct pancrease-ordered

## 2023-12-30 ENCOUNTER — PATIENT MESSAGE (OUTPATIENT)
Dept: HEPATOLOGY | Facility: CLINIC | Age: 48
End: 2023-12-30
Payer: COMMERCIAL

## 2023-12-30 DIAGNOSIS — K86.9 LESION OF PANCREAS: Primary | ICD-10-CM

## 2024-01-02 ENCOUNTER — TELEPHONE (OUTPATIENT)
Dept: ENDOSCOPY | Facility: HOSPITAL | Age: 49
End: 2024-01-02
Payer: COMMERCIAL

## 2024-01-02 ENCOUNTER — PATIENT MESSAGE (OUTPATIENT)
Dept: ENDOSCOPY | Facility: HOSPITAL | Age: 49
End: 2024-01-02
Payer: COMMERCIAL

## 2024-01-02 DIAGNOSIS — R93.89 ABNORMAL FINDING OF DIAGNOSTIC IMAGING: Primary | ICD-10-CM

## 2024-01-02 NOTE — TELEPHONE ENCOUNTER
Jacques Santillan MD [facility] Schedulers  Caller: Unspecified (3 days ago, 12:24 PM)  OK for direct EUS. Next 3-4 weeks.          Previous Messages       ----- Message -----  From: Harini London MD  Sent: 12/30/2023  12:27 PM CST  To: Keyanna Kirby MA; Jacques Santillan MD    ----- Message from Harini London MD sent at 12/30/2023 12:27 PM CST -----  Pancrease cyst clinic referral and EUS ordered given abnormal finidng on ct scan

## 2024-01-02 NOTE — TELEPHONE ENCOUNTER
Spoke to Pt to schedule procedure(s) Upper Endoscopy Ultrasound (EUS)       Physician to perform procedure(s) Dr. ANGELA Cobb  Date of Procedure (s) 1/23/24  Arrival Time 12:45 PM  Time of Procedure(s) 1:45 PM   Location of Procedure(s) [address]  Type of Rx Prep sent to patient: N/A  Instructions provided to patient via [facility]    Patient was informed on the following information and verbalized understanding. Screening questionnaire reviewed with patient and complete. If procedure requires anesthesia, a responsible adult needs to be present to accompany the patient home, patient cannot drive after receiving anesthesia. Appointment details are tentative, especially check-in time. Patient will receive a prep-op call 7 days prior to confirm check-in time for procedure. If applicable the patient should contact their pharmacy to verify Rx for procedure prep is ready for pick-up. Patient was advised to call the scheduling department at [phone] if pharmacy states no Rx is available. Patient was advised to call the endoscopy scheduling department if any questions or concerns arise.      [facility] Scheduling Department

## 2024-01-04 ENCOUNTER — TELEPHONE (OUTPATIENT)
Dept: ENDOSCOPY | Facility: HOSPITAL | Age: 49
End: 2024-01-04
Payer: COMMERCIAL

## 2024-01-12 ENCOUNTER — TELEPHONE (OUTPATIENT)
Dept: GASTROENTEROLOGY | Facility: CLINIC | Age: 49
End: 2024-01-12
Payer: COMMERCIAL

## 2024-01-12 NOTE — TELEPHONE ENCOUNTER
----- Message from Sharyn Moya sent at 1/12/2024 12:27 PM CST -----  Regarding: Pt Advice  Contact: pt [phone]  Pt is scheduled for procedure 1/23, pt would like to plan of care, please call @ [phone]

## 2024-01-18 ENCOUNTER — TELEPHONE (OUTPATIENT)
Dept: ENDOSCOPY | Facility: HOSPITAL | Age: 49
End: 2024-01-18
Payer: COMMERCIAL

## 2024-01-19 ENCOUNTER — OFFICE VISIT (OUTPATIENT)
Dept: DIABETES | Facility: CLINIC | Age: 49
End: 2024-01-19
Payer: COMMERCIAL

## 2024-01-19 VITALS
HEART RATE: 101 BPM | WEIGHT: 204.88 LBS | OXYGEN SATURATION: 98 % | BODY MASS INDEX: 24.95 KG/M2 | DIASTOLIC BLOOD PRESSURE: 78 MMHG | HEIGHT: 76 IN | SYSTOLIC BLOOD PRESSURE: 134 MMHG

## 2024-01-19 DIAGNOSIS — I10 PRIMARY HYPERTENSION: ICD-10-CM

## 2024-01-19 DIAGNOSIS — E11.9 TYPE 2 DIABETES MELLITUS WITHOUT COMPLICATION, WITHOUT LONG-TERM CURRENT USE OF INSULIN: ICD-10-CM

## 2024-01-19 DIAGNOSIS — Z71.9 HEALTH EDUCATION/COUNSELING: ICD-10-CM

## 2024-01-19 DIAGNOSIS — E11.65 TYPE 2 DIABETES MELLITUS WITH HYPERGLYCEMIA, WITH LONG-TERM CURRENT USE OF INSULIN: Primary | ICD-10-CM

## 2024-01-19 DIAGNOSIS — Z87.19 HISTORY OF PANCREATITIS: ICD-10-CM

## 2024-01-19 DIAGNOSIS — E78.5 DYSLIPIDEMIA, GOAL LDL BELOW 100: ICD-10-CM

## 2024-01-19 DIAGNOSIS — Z79.4 TYPE 2 DIABETES MELLITUS WITH HYPERGLYCEMIA, WITH LONG-TERM CURRENT USE OF INSULIN: Primary | ICD-10-CM

## 2024-01-19 DIAGNOSIS — K70.9 ALCOHOLIC LIVER DISEASE: ICD-10-CM

## 2024-01-19 PROBLEM — R73.9 HYPERGLYCEMIA: Status: RESOLVED | Noted: 2023-01-14 | Resolved: 2024-01-19

## 2024-01-19 PROBLEM — E87.29 ALCOHOLIC KETOACIDOSIS: Status: RESOLVED | Noted: 2023-01-05 | Resolved: 2024-01-19

## 2024-01-19 PROBLEM — T88.4XXA HARD TO INTUBATE: Status: RESOLVED | Noted: 2023-02-08 | Resolved: 2024-01-19

## 2024-01-19 PROCEDURE — 3078F DIAST BP <80 MM HG: CPT | Mod: CPTII,S$GLB,, | Performed by: NURSE PRACTITIONER

## 2024-01-19 PROCEDURE — 3008F BODY MASS INDEX DOCD: CPT | Mod: CPTII,S$GLB,, | Performed by: NURSE PRACTITIONER

## 2024-01-19 PROCEDURE — 1159F MED LIST DOCD IN RCRD: CPT | Mod: CPTII,S$GLB,, | Performed by: NURSE PRACTITIONER

## 2024-01-19 PROCEDURE — 99999 PR PBB SHADOW E&M-EST. PATIENT-LVL V: CPT | Mod: PBBFAC,,, | Performed by: NURSE PRACTITIONER

## 2024-01-19 PROCEDURE — 99215 OFFICE O/P EST HI 40 MIN: CPT | Mod: S$GLB,,, | Performed by: NURSE PRACTITIONER

## 2024-01-19 PROCEDURE — 1160F RVW MEDS BY RX/DR IN RCRD: CPT | Mod: CPTII,S$GLB,, | Performed by: NURSE PRACTITIONER

## 2024-01-19 PROCEDURE — 3075F SYST BP GE 130 - 139MM HG: CPT | Mod: CPTII,S$GLB,, | Performed by: NURSE PRACTITIONER

## 2024-01-19 RX ORDER — INSULIN LISPRO 100 [IU]/ML
INJECTION, SOLUTION INTRAVENOUS; SUBCUTANEOUS
COMMUNITY
End: 2024-04-03 | Stop reason: SDUPTHER

## 2024-01-19 RX ORDER — METFORMIN HYDROCHLORIDE 500 MG/1
1000 TABLET, EXTENDED RELEASE ORAL 2 TIMES DAILY WITH MEALS
Qty: 360 TABLET | Refills: 2 | Status: SHIPPED | OUTPATIENT
Start: 2024-01-19 | End: 2024-03-04 | Stop reason: SDUPTHER

## 2024-01-19 RX ORDER — BLOOD-GLUCOSE SENSOR
EACH MISCELLANEOUS
Qty: 3 EACH | Refills: 11 | Status: SHIPPED | OUTPATIENT
Start: 2024-01-19 | End: 2024-04-22 | Stop reason: SDUPTHER

## 2024-01-19 RX ORDER — GLUCAGON 3 MG/1
POWDER NASAL
Qty: 2 EACH | Refills: 1 | Status: SHIPPED | OUTPATIENT
Start: 2024-01-19

## 2024-01-19 RX ORDER — INSULIN DETEMIR 100 [IU]/ML
25 INJECTION, SOLUTION SUBCUTANEOUS NIGHTLY
Qty: 15 ML | Refills: 6 | Status: SHIPPED | OUTPATIENT
Start: 2024-01-19

## 2024-01-19 RX ORDER — KETOCONAZOLE 20 MG/G
CREAM TOPICAL
COMMUNITY
Start: 2023-12-29

## 2024-01-19 RX ORDER — BLOOD-GLUCOSE TRANSMITTER
EACH MISCELLANEOUS
Qty: 1 EACH | Refills: 4 | Status: SHIPPED | OUTPATIENT
Start: 2024-01-19 | End: 2024-04-22 | Stop reason: SDUPTHER

## 2024-01-19 NOTE — PROGRESS NOTES
CC:   Chief Complaint   Patient presents with    Diabetes Mellitus       HPI: Alberto Song is a 48 y.o. male presents for an initial visit today for the management of Diabetes   This is his first visit with me   He was seen at [facility]- but seen last in June 2023     He was diagnosed with Type 2 diabetes in January 2023  when he was admitted for alcoholic/gallstone pancreatitis underwent cholecystectomy -- he was sent home on insulin therapy     C-peptide 3.87 with  when seen outpatient in 2/2023.   C-peptide 2 -- 12/2023-- he was fasting at the but no glucose to compare -- he reports his BG was 290     Insulin antibody negative  JAMISON - +     Family hx of diabetes: denies   Hospitalized for diabetes: denies       No personal or FH of thyroid cancer or personal of pancreatic cancer    + personal Hx of pancreatitis.   + pancreatic cyst     Follows with hepatology   --stopped drinking 11/4 or 11/5/23  --drank heavily ~2-3 years  -PAWEL detox 10/22-stopped x 4-6 months after 28 day inpt rehab    MRI- 12/27:  Impression:     Hepatomegaly and hepatic steatosis.  No suspicious focal hepatic lesion.     New mass along the tail of the pancreas.  In this patient with findings of acute pancreatitis on CT 01/05/2023, this could represent pseudocyst or splenic artery aneurysm.  Pancreatic mass is considered less likely but difficult to exclude.  Further evaluation with pancreas protocol CT is recommended.     Moderate splenomegaly.     This report was flagged in Epic as abnormal.      DIABETES COMPLICATIONS: none      Diabetes Management Status    ASA:  No    Statin: Not taking  ACE/ARB: Taking-- lisinopril 20 mg daily     Screening or Prevention Patient's value Goal Complete/Controlled?   HgA1C Testing and Control   Lab Results   Component Value Date    HGBA1C 5.7 (H) 11/04/2023      Annually/Less than 8% Yes   Lipid profile : 06/08/2023 Annually Yes   LDL control Lab Results   Component Value Date    LDLCALC  117.2 06/08/2023    Annually/Less than 100 mg/dl  No   Nephropathy screening Lab Results   Component Value Date    LABMICR 34.0 06/08/2023     Lab Results   Component Value Date    PROTEINUA Negative 11/10/2023    Annually Yes   Blood pressure BP Readings from Last 1 Encounters:   01/19/24 134/78    Less than 140/90 Yes   Dilated retinal exam : 11/08/2023 Annually Yes   Foot exam   : 01/19/2024 Annually Yes       CURRENT A1C:    Hemoglobin A1C   Date Value Ref Range Status   11/04/2023 5.7 (H) 4.0 - 5.6 % Final     Comment:     ADA Screening Guidelines:  5.7-6.4%  Consistent with prediabetes  >or=6.5%  Consistent with diabetes    High levels of fetal hemoglobin interfere with the HbA1C  assay. Heterozygous hemoglobin variants (HbS, HgC, etc)do  not significantly interfere with this assay.   However, presence of multiple variants may affect accuracy.     06/08/2023 5.6 4.0 - 5.6 % Final     Comment:     ADA Screening Guidelines:  5.7-6.4%  Consistent with prediabetes  >or=6.5%  Consistent with diabetes    High levels of fetal hemoglobin interfere with the HbA1C  assay. Heterozygous hemoglobin variants (HbS, HgC, etc)do  not significantly interfere with this assay.   However, presence of multiple variants may affect accuracy.     06/08/2023 5.6 4.0 - 5.6 % Final     Comment:     ADA Screening Guidelines:  5.7-6.4%  Consistent with prediabetes  >or=6.5%  Consistent with diabetes    High levels of fetal hemoglobin interfere with the HbA1C  assay. Heterozygous hemoglobin variants (HbS, HgC, etc)do  not significantly interfere with this assay.   However, presence of multiple variants may affect accuracy.         GOAL A1C: 7%       DM MEDICATIONS USED IN THE PAST: Metformin   Jardiance   Levemir    Humalog/lispro         CURRENT DIABETES MEDICATIONS: Jardiance 25 mg daily   Metformin XR 1000 mg BID ( 4 tablets per day)   Levemir 20 units daily   Lispro sliding scale if BG is >220-250's -- highest would be 6 units       Insulin: vial and syringe    Missed doses: denies         BLOOD GLUCOSE MONITORING:    Per oral recall:   225-250 FBG -     Mostly 200's       HYPOGLYCEMIA:  denies   Glucagon kit: denies   Medic alert bracelet: denies         MEALS: eating 3 meals per day   Pasta   [place]   Breads   Eating   Drinks: ocean spray 5 nader drinks - SF   SF sodas   Water   Coffee - black        CURRENT EXERCISE:  No      Review of Systems  Review of Systems   Constitutional:  Negative for appetite change, fatigue and unexpected weight change.   HENT:  Negative for trouble swallowing.    Eyes:  Negative for visual disturbance.   Respiratory:  Negative for shortness of breath.    Cardiovascular:  Negative for chest pain.   Gastrointestinal:  Negative for nausea.   Endocrine: Negative for polydipsia, polyphagia and polyuria.   Genitourinary:         No Nocturia    Skin:  Negative for wound.   Neurological:  Positive for numbness (to feet).       Physical Exam   Physical Exam  Vitals and nursing note reviewed.   Constitutional:       Appearance: He is well-developed.   HENT:      Head: Normocephalic and atraumatic.      Right Ear: External ear normal.      Left Ear: External ear normal.      Nose: Nose normal.   Neck:      Thyroid: No thyromegaly.      Trachea: No tracheal deviation.   Cardiovascular:      Rate and Rhythm: Normal rate and regular rhythm.      Heart sounds: No murmur heard.  Pulmonary:      Effort: Pulmonary effort is normal. No respiratory distress.      Breath sounds: Normal breath sounds.   Abdominal:      Palpations: Abdomen is soft.      Tenderness: There is no abdominal tenderness.      Hernia: No hernia is present.   Musculoskeletal:      Cervical back: Normal range of motion and neck supple.   Skin:     General: Skin is warm and dry.      Capillary Refill: Capillary refill takes less than 2 seconds.      Findings: No rash.      Comments: Injection sites are normal appearing. No lipo hypertropthy or atrophy      Neurological:      Mental Status: He is alert and oriented to person, place, and time.      Cranial Nerves: No cranial nerve deficit.   Psychiatric:         Behavior: Behavior normal.         Judgment: Judgment normal.         FOOT EXAMINATION: Appropriate footwear         Lab Results   Component Value Date    TSH 1.810 11/04/2023           Type 2 diabetes mellitus with hyperglycemia, with long-term current use of insulin  Uncontrolled   A1c is skewed -- likely due to liver disease   +JAMISON but C-peptide WNL --- may have some underlying insulin def at times -- but already on insulin  He is interested in diabetes technology   Will start with CGM   Will have him meet with education to discuss pump options         -- Medication Changes:   Continue Metformin XR 1000 mg 2 times per day     Continue Jardiance 25 mg daily     Adjust your Levemir to 25 units daily     Humalog sliding scale as needed before meals   With using correction scale:    180-230: +2 units  231-280: +4 units  281-330: +5 units  331-380: +6 units  >380: +8 units    Start the dexcom G6     Meet with education for pump evaluation as well.       -- Reviewed goals of therapy are to get the best control we can without hypoglycemia.  -- Reviewed patient's current insulin regimen. Clarified proper insulin dose and timing in relation to meals, etc. Insulin injection sites and proper rotation instructed.    -- Advised frequent self blood glucose monitoring.  Patient encouraged to document glucose results and bring them to every clinic visit. RX for dexcom G6 to pharmacy   -- Hypoglycemia precautions discussed. Instructed on precautions before driving.    -- Call for Bg repeatedly < 70 or > 180.   -- Close adherence to lifestyle changes recommended.   -- Periodic follow ups for eye evaluations, foot care and dental care suggested.  -- Refer to diabetes education-- dexcom G6 start and omnipod 5-- he is leaning towards omnipod 5 - brochures given on all pumps        Patient has diabetes mellitus and manages diabetes with intensive insulin regimen and uses prandial and basal insulin daily  Patient requires a therapeutic CGM and is willing to use therapeutic CGM for the necessary frequent adjustments of insulin therapy.  I have completed an in-person visit during the previous 6 months and will continue to have in-person visits every 6 months to assess adherence to their CGM regimen and diabetes treatment plan.  Due to COVID pandemic and need for remote monitoring this tool is medically necessary        Primary hypertension  BP goal is < 140/90.   Tolerating ACEi  Controlled   Blood pressure goals discussed with patient      History of pancreatitis  Avoiding DPP4 and GLP1   May affect insulin production     Dyslipidemia, goal LDL below 100  Lipids with RTC       Alcoholic liver disease  Following with hepatology         I spent a total of 60 minutes on the day of the visit.This includes face to face time and non-face to face time preparing to see the patient (eg, review of tests), obtaining and/or reviewing separately obtained history, documenting clinical information in the electronic or other health record, independently interpreting results and communicating results to the patient/family/caregiver, or care coordinator.        Follow up in about 3 months (around 4/19/2024).  See moira for dexcom G6 start and insulin pump evaluation - may want the omnipod 5  Follow up with me in 3 months          Orders Placed This Encounter   Procedures    Hemoglobin A1C     Standing Status:   Future     Standing Expiration Date:   7/19/2025    Lipid Panel     Standing Status:   Future     Standing Expiration Date:   7/19/2025    Microalbumin/Creatinine Ratio, Urine     Standing Status:   Future     Standing Expiration Date:   7/19/2025     Order Specific Question:   Specimen Source     Answer:   Urine    Fructosamine     Standing Status:   Future     Standing Expiration Date:   7/19/2025     Ambulatory referral/consult to Diabetes Education     Standing Status:   Future     Standing Expiration Date:   7/19/2025     Referral Priority:   Routine     Referral Type:   Consultation     Referral Reason:   Specialty Services Required     Referred to Provider:   Sara Lomax RD, CDE     Requested Specialty:   Diabetes     Number of Visits Requested:   1       Recommendations were discussed with the patient in detail  The patient verbalized understanding and agrees with the plan outlined as above.     This note was partly generated with Sendoid voice recognition software. I apologize for any possible typographical errors.

## 2024-01-19 NOTE — ASSESSMENT & PLAN NOTE
Uncontrolled   A1c is skewed -- likely due to liver disease   +JAMISON but C-peptide WNL --- may have some underlying insulin def at times -- but already on insulin  He is interested in diabetes technology   Will start with CGM   Will have him meet with education to discuss pump options         -- Medication Changes:   Continue Metformin XR 1000 mg 2 times per day     Continue Jardiance 25 mg daily     Adjust your Levemir to 25 units daily     Humalog sliding scale as needed before meals   With using correction scale:    180-230: +2 units  231-280: +4 units  281-330: +5 units  331-380: +6 units  >380: +8 units    Start the dexcom G6     Meet with education for pump evaluation as well.       -- Reviewed goals of therapy are to get the best control we can without hypoglycemia.  -- Reviewed patient's current insulin regimen. Clarified proper insulin dose and timing in relation to meals, etc. Insulin injection sites and proper rotation instructed.    -- Advised frequent self blood glucose monitoring.  Patient encouraged to document glucose results and bring them to every clinic visit. RX for dexcom G6 to pharmacy   -- Hypoglycemia precautions discussed. Instructed on precautions before driving.    -- Call for Bg repeatedly < 70 or > 180.   -- Close adherence to lifestyle changes recommended.   -- Periodic follow ups for eye evaluations, foot care and dental care suggested.  -- Refer to diabetes education-- dexcom G6 start and omnipod 5-- he is leaning towards omnipod 5 - brochures given on all pumps       Patient has diabetes mellitus and manages diabetes with intensive insulin regimen and uses prandial and basal insulin daily  Patient requires a therapeutic CGM and is willing to use therapeutic CGM for the necessary frequent adjustments of insulin therapy.  I have completed an in-person visit during the previous 6 months and will continue to have in-person visits every 6 months to assess adherence to their CGM regimen and  diabetes treatment plan.  Due to COVID pandemic and need for remote monitoring this tool is medically necessary

## 2024-01-19 NOTE — PATIENT INSTRUCTIONS
Continue Metformin XR 1000 mg 2 times per day     Continue Jardiance 25 mg daily     Adjust your Levemir to 25 units daily     Humalog sliding scale as needed before meals   With using correction scale:    180-230: +2 units  231-280: +4 units  281-330: +5 units  331-380: +6 units  >380: +8 units    Start the dexcom G6     Meet with education for pump evaluation as well.

## 2024-01-23 ENCOUNTER — ANESTHESIA EVENT (OUTPATIENT)
Dept: ENDOSCOPY | Facility: HOSPITAL | Age: 49
End: 2024-01-23
Payer: COMMERCIAL

## 2024-01-23 ENCOUNTER — ANESTHESIA (OUTPATIENT)
Dept: ENDOSCOPY | Facility: HOSPITAL | Age: 49
End: 2024-01-23
Payer: COMMERCIAL

## 2024-01-23 ENCOUNTER — HOSPITAL ENCOUNTER (OUTPATIENT)
Facility: HOSPITAL | Age: 49
Discharge: HOME | End: 2024-01-23
Attending: INTERNAL MEDICINE | Admitting: INTERNAL MEDICINE
Payer: COMMERCIAL

## 2024-01-23 VITALS
HEIGHT: 76 IN | WEIGHT: 200 LBS | SYSTOLIC BLOOD PRESSURE: 117 MMHG | OXYGEN SATURATION: 100 % | RESPIRATION RATE: 13 BRPM | HEART RATE: 74 BPM | TEMPERATURE: 98 F | DIASTOLIC BLOOD PRESSURE: 72 MMHG | BODY MASS INDEX: 24.36 KG/M2

## 2024-01-23 DIAGNOSIS — R19.5 POSITIVE COLORECTAL CANCER SCREENING USING COLOGUARD TEST: Primary | ICD-10-CM

## 2024-01-23 DIAGNOSIS — Z87.19 HISTORY OF PANCREATITIS: Primary | ICD-10-CM

## 2024-01-23 LAB
NONINV COLON CA DNA+OCC BLD SCRN STL QL: POSITIVE
POCT GLUCOSE: 131 MG/DL (ref 70–110)
POCT GLUCOSE: 150 MG/DL (ref 70–110)

## 2024-01-23 PROCEDURE — 27202131 HC NEEDLE, FNB SINGLE (ANY): Performed by: INTERNAL MEDICINE

## 2024-01-23 PROCEDURE — 88305 TISSUE EXAM BY PATHOLOGIST: CPT | Performed by: PATHOLOGY

## 2024-01-23 PROCEDURE — 63600175 PHARM REV CODE 636 W HCPCS: Performed by: NURSE ANESTHETIST, CERTIFIED REGISTERED

## 2024-01-23 PROCEDURE — 37000009 HC ANESTHESIA EA ADD 15 MINS: Performed by: INTERNAL MEDICINE

## 2024-01-23 PROCEDURE — D9220A PRA ANESTHESIA: Mod: ANES,,, | Performed by: ANESTHESIOLOGY

## 2024-01-23 PROCEDURE — 88342 IMHCHEM/IMCYTCHM 1ST ANTB: CPT | Mod: 26,,, | Performed by: PATHOLOGY

## 2024-01-23 PROCEDURE — 82962 GLUCOSE BLOOD TEST: CPT | Performed by: INTERNAL MEDICINE

## 2024-01-23 PROCEDURE — 88342 IMHCHEM/IMCYTCHM 1ST ANTB: CPT | Performed by: PATHOLOGY

## 2024-01-23 PROCEDURE — 25000003 PHARM REV CODE 250: Performed by: INTERNAL MEDICINE

## 2024-01-23 PROCEDURE — 88305 TISSUE EXAM BY PATHOLOGIST: CPT | Mod: 26,,, | Performed by: PATHOLOGY

## 2024-01-23 PROCEDURE — 43242 EGD US FINE NEEDLE BX/ASPIR: CPT | Performed by: INTERNAL MEDICINE

## 2024-01-23 PROCEDURE — D9220A PRA ANESTHESIA: Mod: CRNA,,, | Performed by: NURSE ANESTHETIST, CERTIFIED REGISTERED

## 2024-01-23 PROCEDURE — 88173 CYTOPATH EVAL FNA REPORT: CPT | Performed by: PATHOLOGY

## 2024-01-23 PROCEDURE — 37000008 HC ANESTHESIA 1ST 15 MINUTES: Performed by: INTERNAL MEDICINE

## 2024-01-23 PROCEDURE — 25000003 PHARM REV CODE 250: Performed by: NURSE ANESTHETIST, CERTIFIED REGISTERED

## 2024-01-23 PROCEDURE — 88173 CYTOPATH EVAL FNA REPORT: CPT | Mod: 26,,, | Performed by: PATHOLOGY

## 2024-01-23 PROCEDURE — 43242 EGD US FINE NEEDLE BX/ASPIR: CPT | Mod: ,,, | Performed by: INTERNAL MEDICINE

## 2024-01-23 RX ORDER — MIDAZOLAM HYDROCHLORIDE 1 MG/ML
2 INJECTION INTRAMUSCULAR; INTRAVENOUS
Status: DISCONTINUED | OUTPATIENT
Start: 2024-01-23 | End: 2024-01-23 | Stop reason: HOSPADM

## 2024-01-23 RX ORDER — MIDAZOLAM HYDROCHLORIDE 1 MG/ML
INJECTION, SOLUTION INTRAMUSCULAR; INTRAVENOUS
Status: DISCONTINUED | OUTPATIENT
Start: 2024-01-23 | End: 2024-01-23

## 2024-01-23 RX ORDER — CIPROFLOXACIN 2 MG/ML
INJECTION, SOLUTION INTRAVENOUS
Status: DISCONTINUED | OUTPATIENT
Start: 2024-01-23 | End: 2024-01-23

## 2024-01-23 RX ORDER — PROPOFOL 10 MG/ML
VIAL (ML) INTRAVENOUS CONTINUOUS PRN
Status: DISCONTINUED | OUTPATIENT
Start: 2024-01-23 | End: 2024-01-23

## 2024-01-23 RX ORDER — DEXMEDETOMIDINE HYDROCHLORIDE 100 UG/ML
INJECTION, SOLUTION INTRAVENOUS
Status: DISCONTINUED | OUTPATIENT
Start: 2024-01-23 | End: 2024-01-23

## 2024-01-23 RX ORDER — CIPROFLOXACIN 500 MG/1
500 TABLET ORAL EVERY 12 HOURS
Qty: 6 TABLET | Refills: 0 | Status: SHIPPED | OUTPATIENT
Start: 2024-01-23 | End: 2024-01-26

## 2024-01-23 RX ORDER — LIDOCAINE HYDROCHLORIDE 20 MG/ML
INJECTION INTRAVENOUS
Status: DISCONTINUED | OUTPATIENT
Start: 2024-01-23 | End: 2024-01-23

## 2024-01-23 RX ORDER — HALOPERIDOL 5 MG/ML
0.5 INJECTION INTRAMUSCULAR EVERY 10 MIN PRN
Status: DISCONTINUED | OUTPATIENT
Start: 2024-01-23 | End: 2024-01-23 | Stop reason: HOSPADM

## 2024-01-23 RX ORDER — SODIUM CHLORIDE 9 MG/ML
INJECTION, SOLUTION INTRAVENOUS CONTINUOUS
Status: DISCONTINUED | OUTPATIENT
Start: 2024-01-23 | End: 2024-01-23 | Stop reason: HOSPADM

## 2024-01-23 RX ORDER — SODIUM CHLORIDE 0.9 % (FLUSH) 0.9 %
10 SYRINGE (ML) INJECTION
Status: DISCONTINUED | OUTPATIENT
Start: 2024-01-23 | End: 2024-01-23 | Stop reason: HOSPADM

## 2024-01-23 RX ORDER — FENTANYL CITRATE 50 UG/ML
25 INJECTION, SOLUTION INTRAMUSCULAR; INTRAVENOUS EVERY 5 MIN PRN
Status: DISCONTINUED | OUTPATIENT
Start: 2024-01-23 | End: 2024-01-23 | Stop reason: HOSPADM

## 2024-01-23 RX ADMIN — SODIUM CHLORIDE: 0.9 INJECTION, SOLUTION INTRAVENOUS at 03:01

## 2024-01-23 RX ADMIN — SODIUM CHLORIDE: 9 INJECTION, SOLUTION INTRAVENOUS at 01:01

## 2024-01-23 RX ADMIN — PROPOFOL 50 MG: 10 INJECTION, EMULSION INTRAVENOUS at 03:01

## 2024-01-23 RX ADMIN — DEXMEDETOMIDINE 8 MCG: 100 INJECTION, SOLUTION, CONCENTRATE INTRAVENOUS at 03:01

## 2024-01-23 RX ADMIN — DEXMEDETOMIDINE 4 MCG: 100 INJECTION, SOLUTION, CONCENTRATE INTRAVENOUS at 03:01

## 2024-01-23 RX ADMIN — LIDOCAINE HYDROCHLORIDE 50 MG: 20 INJECTION INTRAVENOUS at 03:01

## 2024-01-23 RX ADMIN — PROPOFOL 125 MCG/KG/MIN: 10 INJECTION, EMULSION INTRAVENOUS at 03:01

## 2024-01-23 RX ADMIN — MIDAZOLAM HYDROCHLORIDE 2 MG: 1 INJECTION, SOLUTION INTRAMUSCULAR; INTRAVENOUS at 03:01

## 2024-01-23 RX ADMIN — GLYCOPYRROLATE 0.2 MG: 0.2 INJECTION, SOLUTION INTRAMUSCULAR; INTRAVENOUS at 03:01

## 2024-01-23 RX ADMIN — CIPROFLOXACIN 400 MG: 2 INJECTION, SOLUTION INTRAVENOUS at 03:01

## 2024-01-23 NOTE — PROVATION PATIENT INSTRUCTIONS
Discharge Summary/Instructions after an Endoscopic Procedure  Patient Name: Alberto Song  Patient MRN: [id number]  Patient YOB: 1975 Tuesday, January 23, 2024  Angie Cobb MD  Dear patient,  As a result of recent federal legislation (The Federal Cures Act), you may   receive lab or pathology results from your procedure in your [facility]   account before your physician is able to contact you. Your physician or   their representative will relay the results to you with their   recommendations at their soonest availability.  Thank you,  RESTRICTIONS:  During your procedure today, you received medications for sedation.  These   medications may affect your judgment, balance and coordination.  Therefore,   for 24 hours, you have the following restrictions:   - DO NOT drive a car, operate machinery, make legal/financial decisions,   sign important papers or drink alcohol.    ACTIVITY:  Today: no heavy lifting, straining or running due to procedural   sedation/anesthesia.  The following day: return to full activity including work.  DIET:  Eat and drink normally unless instructed otherwise.     TREATMENT FOR COMMON SIDE EFFECTS:  - Mild abdominal pain, nausea, belching, bloating or excessive gas:  rest,   eat lightly and use a heating pad.  - Sore Throat: treat with throat lozenges and/or gargle with warm salt   water.  - Because air was used during the procedure, expelling large amounts of air   from your rectum or belching is normal.  - If a bowel prep was taken, you may not have a bowel movement for 1-3 days.    This is normal.  SYMPTOMS TO WATCH FOR AND REPORT TO YOUR PHYSICIAN:  1. Abdominal pain or bloating, other than gas cramps.  2. Chest pain.  3. Back pain.  4. Signs of infection such as: chills or fever occurring within 24 hours   after the procedure.  5. Rectal bleeding, which would show as bright red, maroon, or black stools.   (A tablespoon of blood from the rectum is not serious, especially if    hemorrhoids are present.)  6. Vomiting.  7. Weakness or dizziness.  GO DIRECTLY TO THE NEAREST EMERGENCY ROOM IF YOU HAVE ANY OF THE FOLLOWING:      Difficulty breathing              Chills and/or fever over 101 F   Persistent vomiting and/or vomiting blood   Severe abdominal pain   Severe chest pain   Black, tarry stools   Bleeding- more than one tablespoon   Any other symptom or condition that you feel may need urgent attention  Your doctor recommends these additional instructions:  If any biopsies were taken, your doctors clinic will contact you in 1 to 2   weeks with any results.  - Continue to abstain from alcohol/tobacco as these exposures can perpetuate   chronic pancreatitis.  - Discharge patient to home (ambulatory).   - Patient has a contact number available for emergencies.  The signs and   symptoms of potential delayed complications were discussed with the   patient.  Return to normal activities tomorrow.  Written discharge   instructions were provided to the patient.   - Resume previous diet.   - Await cytology results.   - Return to referring physician.   - Cipro 500mg twice daily for next 3 days, preventively.  For questions, problems or results please call your physician - Angie Cobb MD at Work:  [phone].  [facility], EMERGENCY ROOM PHONE NUMBER: [phone]  IF A COMPLICATION OR EMERGENCY SITUATION ARISES AND YOU ARE UNABLE TO REACH   YOUR PHYSICIAN - GO DIRECTLY TO THE EMERGENCY ROOM.  Angie Cobb MD  1/23/2024 4:14:33 PM  This report has been verified and signed electronically.  Dear patient,  As a result of recent federal legislation (The Federal Cures Act), you may   receive lab or pathology results from your procedure in your [facility]   account before your physician is able to contact you. Your physician or   their representative will relay the results to you with their   recommendations at their soonest availability.  Thank you,  PROVATION

## 2024-01-23 NOTE — ANESTHESIA PREPROCEDURE EVALUATION
01/23/2024  Alberto Song is a 48 y.o., male.    Pre-operative evaluation for Procedure(s) (LRB):  ULTRASOUND, UPPER GI TRACT, ENDOSCOPIC (N/A)    Alberto Song is a 48 y.o. male     Patient Active Problem List   Diagnosis    Acute alcoholic pancreatitis    Steatohepatitis    Proteinuria    Alcohol withdrawal syndrome, with delirium    Alcohol use disorder, severe, dependence    MDD (major depressive disorder), recurrent episode, moderate    Primary hypertension    Alcoholic liver disease    Type 2 diabetes mellitus with hyperglycemia, with long-term current use of insulin    Dyslipidemia, goal LDL below 100    History of pancreatitis    Pancytopenia    Palliative care encounter    Urge incontinence of urine    Nicotine use       Review of patient's allergies indicates:  No Known Allergies    No current facility-administered medications on file prior to encounter.     Current Outpatient Medications on File Prior to Encounter   Medication Sig Dispense Refill    folic acid (FOLVITE) 1 MG tablet Take 1 tablet (1 mg total) by mouth once daily. 30 tablet 2    lisinopriL (PRINIVIL,ZESTRIL) 20 MG tablet Take 1 tablet (20 mg total) by mouth once daily. 30 tablet 2    rifAXIMin (XIFAXAN) 550 mg Tab Take 1 tablet (550 mg total) by mouth 2 (two) times daily. (Patient not taking: Reported on 11/21/2023) 60 tablet 2    thiamine 100 MG tablet Take 1 tablet (100 mg total) by mouth once daily. 30 tablet 2       Past Surgical History:   Procedure Laterality Date    LAPAROSCOPIC CHOLECYSTECTOMY N/A 2/8/2023    Procedure: CHOLECYSTECTOMY, LAPAROSCOPIC;  Surgeon: Nilo Tobias MD;  Location: [facility];  Service: General;  Laterality: N/A;       Social History     Socioeconomic History    Marital status: Single   Tobacco Use    Smoking status: Every Day     Current packs/day: 1.00     Average packs/day: 1  pack/day for 32.0 years (32.0 ttl pk-yrs)     Types: Cigarettes    Smokeless tobacco: Never   Substance and Sexual Activity    Alcohol use: Not Currently     Comment: One 750ml per day    Drug use: Never    Sexual activity: Not Currently     Partners: Female     Birth control/protection: None   Social History Narrative    ** Merged History Encounter **          Social Determinants of Health     Financial Resource Strain: Patient Declined (11/6/2023)    Overall Financial Resource Strain (CARDIA)     Difficulty of Paying Living Expenses: Patient declined   Food Insecurity: Patient Declined (11/6/2023)    Hunger Vital Sign     Worried About Running Out of Food in the Last Year: Patient declined     Ran Out of Food in the Last Year: Patient declined   Transportation Needs: Patient Declined (11/6/2023)    PRAPARE - Transportation     Lack of Transportation (Medical): Patient declined     Lack of Transportation (Non-Medical): Patient declined   Physical Activity: Patient Declined (11/6/2023)    Exercise Vital Sign     Days of Exercise per Week: Patient declined     Minutes of Exercise per Session: Patient declined   Stress: Patient Declined (11/6/2023)    [place] citizen [place] of Occupational [employer] - Occupational Stress Questionnaire     Feeling of Stress : Patient declined   Social Connections: Patient Declined (11/6/2023)    Social Connection and Isolation Panel [NHANES]     Frequency of Communication with Friends and Family: Patient declined     Frequency of Social Gatherings with Friends and Family: Patient declined     Attends Gnosticist Services: Patient declined     Active Member of Clubs or Organizations: Patient declined     Attends Club or Organization Meetings: Patient declined     Marital Status: Patient declined   Housing Stability: Unknown (11/6/2023)    Housing Stability Vital Sign     Unable to Pay for Housing in the Last Year: Patient refused     Unstable Housing in the Last Year: Patient refused         CBC:  "No results for input(s): "WBC", "RBC", "HGB", "HCT", "PLT", "MCV", "MCH", "MCHC" in the last 72 hours.    CMP: No results for input(s): "NA", "K", "CL", "CO2", "BUN", "CREATININE", "GLU", "MG", "PHOS", "CALCIUM", "ALBUMIN", "PROT", "ALKPHOS", "ALT", "AST", "BILITOT" in the last 72 hours.    INR  No results for input(s): "PT", "INR", "PROTIME", "APTT" in the last 72 hours.        Diagnostic Studies:      EKD Echo:  No results found for this or any previous visit.        Pre-op Assessment    I have reviewed the Patient Summary Reports.    I have reviewed the NPO Status.   I have reviewed the Medications.     Review of Systems  Anesthesia Hx:  No problems with previous Anesthesia               Denies Personal Hx of Anesthesia complications.                    Cardiovascular:  Exercise tolerance: good   Hypertension                                        Pulmonary:    Asthma mild                   Hepatic/GI:      Liver Disease,            Neurological:    Denies CVA.    Denies Seizures.                                Endocrine:  Diabetes               Physical Exam  General: Cooperative, Alert and Oriented    Airway:  Mallampati: II   Mouth Opening: Normal  TM Distance: Normal  Tongue: Normal  Neck ROM: Normal ROM    Dental:  Intact        Anesthesia Plan  Type of Anesthesia, risks & benefits discussed:    Anesthesia Type: Gen Natural Airway  Intra-op Monitoring Plan: Standard ASA Monitors  Post Op Pain Control Plan: multimodal analgesia and IV/PO Opioids PRN  Induction:  IV  Informed Consent: Informed consent signed with the Patient and all parties understand the risks and agree with anesthesia plan.  All questions answered.   ASA Score: 2  Day of Surgery Review of History & Physical: H&P Update referred to the surgeon/provider.    Ready For Surgery From Anesthesia Perspective.     .      "

## 2024-01-23 NOTE — TRANSFER OF CARE
"Anesthesia Transfer of Care Note    Patient: Alberto Song    Procedure(s) Performed: Procedure(s) (LRB):  ULTRASOUND, UPPER GI TRACT, ENDOSCOPIC (N/A)    Patient location: PACU    Anesthesia Type: general    Transport from OR: Transported from OR on 2-3 L/min O2 by NC with adequate spontaneous ventilation    Post pain: adequate analgesia    Post assessment: no apparent anesthetic complications    Post vital signs: stable    Level of consciousness: awake    Nausea/Vomiting: no nausea/vomiting    Complications: none    Transfer of care protocol was followed      Last vitals: Visit Vitals  /78 (Patient Position: Lying)   Pulse 92   Temp 36.7 °C (98.1 °F) (Temporal)   Resp 16   Ht 6' 4" (1.93 m)   Wt 90.7 kg (200 lb)   SpO2 98%   BMI 24.34 kg/m²     "

## 2024-01-23 NOTE — H&P
Short Stay Endoscopy History and Physical    PCP - Paolo Catalan MD  Referring Physician - Harini London MD  [address] [place] [address]    Procedure - EUS  ASA - per anesthesia  Mallampati - per anesthesia  History of Anesthesia problems - per anesthesia  Family history Anesthesia problems -  per anesthesia   Plan of anesthesia - per anesthesia    HPI:  This is a 48 y.o. male here for evaluation of: EUS for abnormal imaging of pancreas tail lesion    Reflux - no  Dysphagia - no  Abdominal pain - no  Diarrhea - no    ROS:  Constitutional: No fevers, chills, No weight loss  CV: No chest pain  Pulm: No cough, No shortness of breath  Ophtho: No vision changes  GI: see HPI  Derm: No rash    Medical History:  has a past medical history of Asthma, Diabetes mellitus, Hyperlipidemia, Hypertension, Sleep apnea, unspecified, and Type 2 diabetes mellitus without complication, without long-term current use of insulin (6/6/2023).    Surgical History:  has a past surgical history that includes Laparoscopic cholecystectomy (N/A, 2/8/2023).    Family History: family history includes Cancer in his mother; Early death in his mother; Hypertension in his father..    Social History:  reports that he has been smoking cigarettes. He has a 32.0 pack-year smoking history. He has never used smokeless tobacco. He reports that he does not currently use alcohol. He reports that he does not use drugs.    Review of patient's allergies indicates:  No Known Allergies    Medications:   Medications Prior to Admission   Medication Sig Dispense Refill Last Dose    empagliflozin (JARDIANCE) 25 mg tablet Take 1 tablet (25 mg total) by mouth once daily. 90 tablet 2 Past Week    folic acid (FOLVITE) 1 MG tablet Take 1 tablet (1 mg total) by mouth once daily. 30 tablet 2 1/22/2024    insulin detemir U-100, Levemir, (LEVEMIR FLEXPEN) 100 unit/mL (3 mL) InPn pen Inject 25 Units into the skin every evening. Max TDD of 50 units.  Titrate up to FBG <130. 15 mL 6 1/23/2024    lisinopriL (PRINIVIL,ZESTRIL) 20 MG tablet Take 1 tablet (20 mg total) by mouth once daily. 30 tablet 2 1/23/2024    metFORMIN (GLUCOPHAGE-XR) 500 MG ER 24hr tablet Take 2 tablets (1,000 mg total) by mouth 2 (two) times daily with meals. 360 tablet 2 1/22/2024    blood-glucose sensor (DEXCOM G6 SENSOR) Deisi 1 sensor every 10 days 3 each 11     blood-glucose transmitter (DEXCOM G6 TRANSMITTER) Deisi 1 transmitter every 3 months 1 each 4     glucagon (BAQSIMI) 3 mg/actuation Spry 3 mg (one actuation) into a single nostril; if no response, may repeat in 15 minutes using a new intranasal device. 2 each 1     insulin lispro 100 unit/mL injection Inject into the skin as needed for High Blood Sugar. Sliding scale as needed       ketoconazole (NIZORAL) 2 % cream        rifAXIMin (XIFAXAN) 550 mg Tab Take 1 tablet (550 mg total) by mouth 2 (two) times daily. (Patient not taking: Reported on 11/21/2023) 60 tablet 2     thiamine 100 MG tablet Take 1 tablet (100 mg total) by mouth once daily. 30 tablet 2        Physical Exam:    Vital Signs:   Vitals:    01/23/24 1352   BP: 133/78   Pulse: 92   Resp: 16   Temp: 98.1 °F (36.7 °C)       General Appearance: Well appearing in no acute distress    Labs:  Lab Results   Component Value Date    WBC 5.19 12/22/2023    HGB 12.8 (L) 12/22/2023    HCT 40.3 12/22/2023     12/22/2023    CHOL 173 06/08/2023    TRIG 69 06/08/2023    HDL 42 06/08/2023    ALT 32 12/22/2023    AST 38 12/22/2023     12/22/2023    K 4.3 12/22/2023     12/22/2023    CREATININE 0.7 12/22/2023    BUN 11 12/22/2023    CO2 23 12/22/2023    TSH 1.810 11/04/2023    INR 1.0 12/22/2023    HGBA1C 5.7 (H) 11/04/2023       I have explained the risks and benefits of this endoscopic procedure to the patient including but not limited to bleeding, inflammation, infection, perforation, pancreatitis, missing a lesion and death.      Angie Cobb MD

## 2024-01-23 NOTE — TRANSFER OF CARE
"Anesthesia Transfer of Care Note    Patient: Alberto Song    Procedure(s) Performed: Procedure(s) (LRB):  ULTRASOUND, UPPER GI TRACT, ENDOSCOPIC (N/A)    Patient location: PACU    Anesthesia Type: general    Transport from OR: Transported from OR on 2-3 L/min O2 by NC with adequate spontaneous ventilation    Post pain: adequate analgesia    Post assessment: no apparent anesthetic complications    Post vital signs: stable    Level of consciousness: awake    Nausea/Vomiting: no nausea/vomiting    Complications: none    Transfer of care protocol was followed      Last vitals: Visit Vitals  /61   Pulse 80   Temp 37.1 °C (98.7 °F) (Temporal)   Resp (!) 26   Ht 6' 4" (1.93 m)   Wt 90.7 kg (200 lb)   SpO2 100%   BMI 24.34 kg/m²     "

## 2024-01-24 NOTE — ANESTHESIA POSTPROCEDURE EVALUATION
Anesthesia Post Evaluation    Patient: Alberto Song    Procedure(s) Performed: Procedure(s) (LRB):  ULTRASOUND, UPPER GI TRACT, ENDOSCOPIC (N/A)    Final Anesthesia Type: general (Natural airway)      Patient location during evaluation: [facility]  Patient participation: Yes- Able to Participate  Level of consciousness: awake and alert  Post-procedure vital signs: reviewed and stable  Pain management: adequate  Airway patency: patent    PONV status at discharge: No PONV  Anesthetic complications: no      Cardiovascular status: hemodynamically stable  Respiratory status: unassisted  Hydration status: euvolemic  Follow-up not needed.              Vitals Value Taken Time   /76 01/23/24 1632   Temp 36.6 °C (97.9 °F) 01/23/24 1615   Pulse 74 01/23/24 1635   Resp 18 01/23/24 1635   SpO2 99 % 01/23/24 1635   Vitals shown include unvalidated device data.      No case tracking events are documented in the log.      Pain/Davis Score: Davis Score: 10 (1/23/2024  4:30 PM)

## 2024-01-26 LAB
COMMENT: NORMAL
FINAL PATHOLOGIC DIAGNOSIS: NORMAL

## 2024-01-29 ENCOUNTER — PATIENT MESSAGE (OUTPATIENT)
Dept: GASTROENTEROLOGY | Facility: CLINIC | Age: 49
End: 2024-01-29
Payer: COMMERCIAL

## 2024-01-29 ENCOUNTER — TELEPHONE (OUTPATIENT)
Dept: HEPATOLOGY | Facility: CLINIC | Age: 49
End: 2024-01-29
Payer: COMMERCIAL

## 2024-01-29 RX ORDER — SODIUM, POTASSIUM,MAG SULFATES 17.5-3.13G
1 SOLUTION, RECONSTITUTED, ORAL ORAL DAILY
Qty: 1 KIT | Refills: 0 | Status: SHIPPED | OUTPATIENT
Start: 2024-01-29 | End: 2024-01-31

## 2024-01-29 NOTE — TELEPHONE ENCOUNTER
Returned call, he stated that his old ins expires in a few days, so I scheduled labs for tomorrow at [facility] and his new ins will be settled in bye the time he has to have labs again in 2 weeks    ----- Message from Hugh Doyle sent at 1/29/2024 10:25 AM CST -----  Contact: [phone]@[email or web address]  Good morning patient would like a call back to discuss his labs that he has to take every 2 weeks. Patient wants to start them on 1/30 or 1/31. Please give patient a call back [phone]

## 2024-02-07 ENCOUNTER — TELEPHONE (OUTPATIENT)
Dept: ENDOSCOPY | Facility: HOSPITAL | Age: 49
End: 2024-02-07
Payer: COMMERCIAL

## 2024-02-07 ENCOUNTER — TELEPHONE (OUTPATIENT)
Dept: GASTROENTEROLOGY | Facility: CLINIC | Age: 49
End: 2024-02-07
Payer: COMMERCIAL

## 2024-02-07 NOTE — TELEPHONE ENCOUNTER
Spoke to pt. Per Dr. Cobb clinic to review recent findings as a virtual visit in next 4-6 weeks . Pt verbalizes understanding.

## 2024-02-07 NOTE — TELEPHONE ENCOUNTER
I spoke with the patient and he is waiting on his insurance company to call him back they are escalating the authorization now and will call him and he will let us know if he can still do the test.        ----- Message from Jean Carlos Bull MA sent at 2/6/2024  4:13 PM CST -----  Regarding: FW: Denial    ----- Message -----  From: Nohemi Sanz MA  Sent: 2/6/2024  11:27 AM CST  To: [facility] Clinical Support  Subject: FW: Denial                                         ----- Message -----  From: Sharee Licona  Sent: 2/6/2024  11:20 AM CST  To: [facility] Staff  Subject: Denial                                           Good Morning,     This patient's procedure has been denied with his insurance as he does not have hospital coverage. No ER, inpatient or outpatient benefits. Pt has been informed and stated he will call his insurance.     Thank You,   Sharee Licona   Pre-Service Rep  [facility]  Phone:[phone]  Fax:[phone]

## 2024-02-07 NOTE — TELEPHONE ENCOUNTER
Patient r/s'ed to 2/14/2024    ----- Message from Mago Oswald MA sent at 2/7/2024  4:14 PM CST -----  Contact: pt    ----- Message -----  From: Татьяна Rogel  Sent: 2/7/2024   4:05 PM CST  To: [facility] Staff    Type:  Reschedule Procedure Appointment Request    Caller is requesting a sooner appointment.  Caller declined first available appointment listed below.  Caller will not accept being placed on the waitlist and is requesting a message be sent to doctor.  Name of Caller:pt   When is the first available appointment?02/08  Symptoms:Procedure  Would the patient rather a call back or a response via [facility]? call  Best Call Back Number:[phone]  Additional Information:   Pt requesting to be reschedule next Wednesday due to ins  Pt prefers morning time

## 2024-02-12 ENCOUNTER — TELEPHONE (OUTPATIENT)
Dept: GASTROENTEROLOGY | Facility: CLINIC | Age: 49
End: 2024-02-12
Payer: COMMERCIAL

## 2024-02-12 ENCOUNTER — NUTRITION (OUTPATIENT)
Dept: DIABETES | Facility: CLINIC | Age: 49
End: 2024-02-12
Payer: COMMERCIAL

## 2024-02-12 ENCOUNTER — PATIENT MESSAGE (OUTPATIENT)
Dept: ENDOSCOPY | Facility: HOSPITAL | Age: 49
End: 2024-02-12
Payer: COMMERCIAL

## 2024-02-12 DIAGNOSIS — Z79.4 TYPE 2 DIABETES MELLITUS WITH HYPERGLYCEMIA, WITH LONG-TERM CURRENT USE OF INSULIN: ICD-10-CM

## 2024-02-12 DIAGNOSIS — E11.65 TYPE 2 DIABETES MELLITUS WITH HYPERGLYCEMIA, WITH LONG-TERM CURRENT USE OF INSULIN: Primary | ICD-10-CM

## 2024-02-12 DIAGNOSIS — E11.9 TYPE 2 DIABETES MELLITUS WITHOUT COMPLICATION, WITHOUT LONG-TERM CURRENT USE OF INSULIN: Primary | ICD-10-CM

## 2024-02-12 DIAGNOSIS — Z79.4 TYPE 2 DIABETES MELLITUS WITH HYPERGLYCEMIA, WITH LONG-TERM CURRENT USE OF INSULIN: Primary | ICD-10-CM

## 2024-02-12 DIAGNOSIS — E11.9 TYPE 2 DIABETES MELLITUS WITHOUT COMPLICATION, WITHOUT LONG-TERM CURRENT USE OF INSULIN: ICD-10-CM

## 2024-02-12 DIAGNOSIS — E11.65 TYPE 2 DIABETES MELLITUS WITH HYPERGLYCEMIA, WITH LONG-TERM CURRENT USE OF INSULIN: ICD-10-CM

## 2024-02-12 PROCEDURE — 99212 OFFICE O/P EST SF 10 MIN: CPT | Mod: PBBFAC,PN | Performed by: DIETITIAN, REGISTERED

## 2024-02-12 PROCEDURE — G0108 DIAB MANAGE TRN  PER INDIV: HCPCS | Mod: S$GLB,,, | Performed by: DIETITIAN, REGISTERED

## 2024-02-12 PROCEDURE — 99999 PR PBB SHADOW E&M-EST. PATIENT-LVL II: CPT | Mod: PBBFAC,,, | Performed by: DIETITIAN, REGISTERED

## 2024-02-12 PROCEDURE — G0108 DIAB MANAGE TRN  PER INDIV: HCPCS | Mod: PBBFAC,PN | Performed by: DIETITIAN, REGISTERED

## 2024-02-12 RX ORDER — INSULIN PMP CART,AUT,G6/7,CNTR
1 EACH SUBCUTANEOUS
Qty: 10 EACH | Refills: 4 | Status: SHIPPED | OUTPATIENT
Start: 2024-02-12 | End: 2024-04-04 | Stop reason: SDUPTHER

## 2024-02-12 RX ORDER — INSULIN PMP CART,AUT,G6/7,CNTR
1 EACH SUBCUTANEOUS
Qty: 1 EACH | Refills: 0 | Status: SHIPPED | OUTPATIENT
Start: 2024-02-12 | End: 2024-04-04 | Stop reason: SDUPTHER

## 2024-02-12 NOTE — TELEPHONE ENCOUNTER
Patient is ready to start the omnipod 5. [facility].   Thank you

## 2024-02-12 NOTE — TELEPHONE ENCOUNTER
----- Message from Abel Mcfarlane MA sent at 2/12/2024  4:05 PM CST -----  Contact: [phone]@[email or web address]    ----- Message -----  From: Hugh Doyle  Sent: 2/12/2024   3:58 PM CST  To: [facility] Staff    Good afternoon patient would like a call back to discuss if this procedure is covered by his insurance. Patient says he was speaking to a Ms. Florence about this. Please give patient  a call back   [phone]

## 2024-02-12 NOTE — TELEPHONE ENCOUNTER
Please see the attached refill request. Next appt. with Shakira is 4/22 Please send over rx to [facility]

## 2024-02-12 NOTE — TELEPHONE ENCOUNTER
Spoke with pt. Advised pt that as of today his auth for procedure is still pending. All questions answered. Pt verbalized understanding.

## 2024-02-14 PROBLEM — R19.5 POSITIVE COLORECTAL CANCER SCREENING USING COLOGUARD TEST: Status: ACTIVE | Noted: 2024-02-14

## 2024-02-19 NOTE — PROGRESS NOTES
Diabetes Care Specialist Progress Note  Author: Sara Lomax RD, CDE  Date: 2/19/2024    Program Intake  Reason for Diabetes Program Visit:: Initial Diabetes Assessment  Type of Intervention:: Individual  Individual: Education, Device Training  Education: Insulin Pump Evaluation  Device Training: Personal CGM  Current diabetes risk level:: low (HgbA1c 5.7)  In the last 12 months, have you:: none  Permission to speak with others about care:: no  Continuous Glucose Monitoring  Patient has CGM: Yes  Personal CGM type:: Dexcom G6    Lab Results   Component Value Date    HGBA1C 5.7 (H) 11/04/2023       Clinical            There is no height or weight on file to calculate BMI.    Patient Health Rating  Compared to other people your age, how would you rate your health?: Good    Problem Review  Reviewed Problem List with Patient: yes  Active comorbidities affecting diabetes self-care.: no  Reviewed health maintenance: yes    Clinical Assessment  Current Diabetes Treatment: Insulin, Oral Medication, Diet (jardiance 25mg, levemir 25 units, lispro  PRN, Metformin XR 1000 mg twice a day, baqsimi)  Have you ever experienced hypoglycemia (low blood sugar)?: no  Have you ever experienced hyperglycemia (high blood sugar)?: no    Medication Information  How do you obtain your medications?: Patient drives  How many days a week do you miss your medications?: Never  Do you use a pill box or medication chart to help you manage your medications?: No  Do you sometimes have difficulty refilling your medications?: No  Medication adherence impacting ability to self-manage diabetes?: No    Labs  Do you have regular lab work to monitor your medications?: Yes  Type of Regular Lab Work: A1c, Cholesterol, Microalbumin, CBC, BMP  Where do you get your labs drawn?: [facility]  Lab Compliance Barriers: No    Nutritional Status  Diet: Regular  Meal Plan 24 Hour Recall: Breakfast, Lunch, Dinner, Snack  Meal Plan 24 Hour Recall - Breakfast: coffee some  bread/toast  Meal Plan 24 Hour Recall - Lunch: eating pastas, bread, stuff like that  Meal Plan 24 Hour Recall - Dinner: eating pastas, bread, stuff like that  Meal Plan 24 Hour Recall - Snack: Drinks: ocean spray 5 nader drinks - SF, SF sodas, Water, and Coffee - black  Change in appetite?: No  Dentation:: Intact  Recent Changes in Weight: No Recent Weight Change  Current nutritional status an area of need that is impacting patient's ability to self-manage diabetes?: No    Additional Social History    Support  Does anyone support you with your diabetes care?: yes  Who supports you?: family member, parent, friend  Who takes you to your medical appointments?: self  Does the current support meet the patient's needs?: Yes  Is Support an area impacting ability to self-manage diabetes?: No    Access to Mass Media & Technology  Does the patient have access to any of the following devices or technologies?: Smart phone  Media or technology needs impacting ability to self-manage diabetes?: No    Cognitive/Behavioral Health  Alert and Oriented: Yes  Difficulty Thinking: No  Requires Prompting: No  Requires assistance for routine expression?: No  Cognitive or behavioral barriers impacting ability to self-manage diabetes?: No    Culture/Nondenominational  Culture or Druze beliefs that may impact ability to access healthcare: No    Communication  Language preference: English  Hearing Problems: No  Vision Problems: No  Communication needs impacting ability to self-manage diabetes?: No    Health Literacy  Preferred Learning Method: Face to Face, Web Based  How often do you need to have someone help you read instructions, pamphlets, or written material from your doctor or pharmacy?: Never  Health literacy needs impacting ability to self-manage diabetes?: No      Diabetes Self-Management Skills Assessment    Diabetes Disease Process/Treatment Options  Patient/caregiver able to state what happens when someone has diabetes.:  yes  Patient/caregiver knows what type of diabetes they have.: yes  Diabetes Type : Type II  Patient/caregiver able to identify at least three signs and symptoms of diabetes.: yes  Identified signs and symptoms:: increased thirst, frequent urination  Diabetes Disease Process/Treatment Options: Skills Assessment Completed: Yes  Assessment indicates:: Adequate understanding  Area of need?: No    Nutrition/Healthy Eating  Method of carbohydrate measurement:: carb counting/reading labels  Patient can identify foods that impact blood sugar.: yes  Patient-identified foods:: fruit/fruit juice, milk, soda, starchy vegetables (corn, peas, beans), starches (bread, pasta, rice, cereal), sweets, yogurt  Nutrition/Healthy Eating Skills Assessment Completed:: Yes  Assessment indicates:: Adequate understanding  Area of need?: No    Physical Activity/Exercise  Patient's daily activity level:: moderately active  Patient formally exercises outside of work.: no  Reasons for not exercising:: work schedule  Patient can identify forms of physical activity.: yes  Stated forms of physical activity:: manual activity at work  Patient can identify reasons why exercise/physical activity is important in diabetes management.: yes  Identified reasons:: strengthens heart, muscles, and bones, tones muscles, lowers risk of heart disease and stroke, keeps body and joints flexible  Physical Activity/Exercise Skills Assessment Completed: : Yes  Assessment indicates:: Adequate understanding  Area of need?: No    Medications  Patient is able to describe current diabetes management routine.: yes  Diabetes management routine:: diet, insulin, oral medications  Patient is able to identify current diabetes medications, dosages, and appropriate timing of medications.: yes  Patient understands the purpose of the medications taken for diabetes.: yes  Patient reports problems or concerns with current medication regimen.: no  Medication Skills Assessment  Completed:: Yes  Assessment indicates:: Adequate understanding, Other (comment)  Area of need?: Yes    Home Blood Glucose Monitoring  Patient states that blood sugar is checked at home daily.: yes  Monitoring Method:: personal continuous glucose monitor  Personal CGM type:: Dexcom G6  Patient is able to use personal CGM appropriately.: yes  CGM Report reviewed?: no  Unable to download personal CGM: patient is not connected to the clinic  Home Blood Glucose Monitoring Skills Assessment Completed: : Yes  Assessment indicates:: Adequate understanding, Instruction Needed  Area of need?: Yes    Acute Complications  Acute Complications Skills Assessment Completed: : No  Deffered due to:: Time  Area of need?: Deferred    Chronic Complications  Chronic Complications Skills Assessment Completed: : No  Deferred due to:: Time  Area of need?: Deferred    Psychosocial/Coping  Psychosocial/Coping Skills Assessment Completed: : No  Deffered due to:: Time  Area of need?: Deferred      Assessment Summary and Plan    Based on today's diabetes care assessment, the following areas of need were identified:          2/12/2024    12:02 AM   Social   Support No   Access to Mass Media/Tech No   Cognitive/Behavioral Health No   Culture/Congregational No   Communication No   Health Literacy No            2/12/2024    12:02 AM   Clinical   Medication Adherence No   Lab Compliance No   Nutritional Status No            2/12/2024    12:02 AM   Diabetes Self-Management Skills   Diabetes Disease Process/Treatment Options No   Nutrition/Healthy Eating No   Physical Activity/Exercise No   Medication Yes, Insulin Pump Education  Discussed Medtronic 780G series Pump and the Guardian Sensor; Tandem T-Slim and control IQ; Tandem Mobi; Beta Bionics; and OmniPod Dash /OmniPod 5 Automated mode.    Patient educated on insulin pump therapy, the purpose of insulin pump therapy, advantages and disadvantages of insulin pump therapy and/vs multiple daily injections,  what a basal rate and bolus dose are, when to change infusion site and tubing, demonstrated how to prepare the syringe/cartridge and tubing (except for OmniPod) for use in the pump  Discussed ease of usage, infusion sets, communication with the sensor, basal and bolus, carbohydrate to insulin ration, correction factors, approved areas to insert set, carbohydrate counting, error messages, how to disconnect and reconnect the cartridge and tubing   Patient instructed that based on current insulin regimen she would be changing infusion set daily.  Patient is interested in the omnipod 5   Home Blood Glucose Monitoring Yes, patient is using the dexcom G6, provided with email to link to clinic, will educate on connecting the omnipod 5 and the dexcom G6 at follow-up/pump start   Acute Complications Deferred   Chronic Complications Deferred   Psychosocial/Coping Deferred          Today's interventions were provided through individual discussion, instruction, and written materials were provided.      Patient verbalized understanding of instruction and written materials.  Pt was able to return back demonstration of instructions today. Patient understood key points, needs reinforcement and further instruction.     Diabetes Self-Management Care Plan:    Today's Diabetes Self-Management Care Plan was developed with Alberto's input. Alberto has agreed to work toward the following goal(s) to improve his/her overall diabetes control.      Care Plan: Diabetes Management   Updates made since 1/20/2024 12:00 AM        Problem: Healthy Eating         Goal: Eat 3 meals daily with 45-60g/3-4 servings of Carbohydrate per meal.    Start Date: 2/12/2024   Expected End Date: 8/19/2024   This Visit's Progress: Deferred   Priority: High   Barriers: No Barriers Identified        Task: Reviewed the sources and role of Carbohydrate, Protein, and Fat and how each nutrient impacts blood sugar. Completed 2/19/2024        Task: Provided visual examples  using dry measuring cups, food models, and other familiar objects such as computer mouse, deck or cards, tennis ball etc. to help with visualization of portions. Completed 2/19/2024        Task: Explained how to count carbohydrates using the food label and the use of dry measuring cups for accurate carb counting.         Task: Discussed strategies for choosing healthier menu options when dining out.         Task: Recommended replacing beverages containing high sugar content with noncaloric/sugar free options and/or water.         Task: Review the importance of balancing carbohydrates with each meal using portion control techniques to count servings of carbohydrate and label reading to identify serving size and amount of total carbs per serving.         Task: Provided Sample plate method and reviewed the use of the plate to estimate amounts of carbohydrate per meal.           Follow Up Plan     Follow up in about 4 weeks (around 3/11/2024) for Insulin Pump Start.    Today's care plan and follow up schedule was discussed with patient.  Alberto verbalized understanding of the care plan, goals, and agrees to follow up plan.        The patient was encouraged to communicate with his/her health care provider/physician and care team regarding his/her condition(s) and treatment.  I provided the patient with my contact information today and encouraged to contact me via phone or [facility] as needed.     Length of Visit   Total Time: 60 Minutes

## 2024-02-25 DIAGNOSIS — R73.9 HYPERGLYCEMIA: ICD-10-CM

## 2024-02-25 NOTE — TELEPHONE ENCOUNTER
Refill Routing Note   Medication(s) are not appropriate for processing by [facility] for the following reason(s):      SLIDING SCALE IS OUTSIDE OF PROTOCOL FOR ORC    [facility] action(s):  Route Care Due:  None identified     Medication Therapy Plan: SLIDING SCALE IS OUTSIDE OF PROTOCOL FOR ORC      Appointments  past 12m or future 3m with PCP    Date Provider   Last Visit   12/27/2023 Paolo Catalan MD   Next Visit   3/4/2024 Paolo Catalan MD   ED visits in past 90 days: 0        Note composed:2:55 PM 02/25/2024

## 2024-02-26 RX ORDER — INSULIN LISPRO 100 [IU]/ML
INJECTION, SOLUTION INTRAVENOUS; SUBCUTANEOUS
Qty: 9 ML | Refills: 3 | Status: SHIPPED | OUTPATIENT
Start: 2024-02-26

## 2024-02-27 ENCOUNTER — TELEPHONE (OUTPATIENT)
Dept: INTERNAL MEDICINE | Facility: CLINIC | Age: 49
End: 2024-02-27
Payer: COMMERCIAL

## 2024-02-27 NOTE — TELEPHONE ENCOUNTER
View all authorizations for this medication   Denied OMNIPOD 5 G6 PODS, GEN 5, Crtg    2/27/2024  9:19 AM  Case ID: [id number] Sending pharmacy: [id number] [address] AT [address] Appeal supported: No  Note from payer: PA Case: [id number], Status: Denied. Notification: Completed.   Payer: [place] [employer]      Waiting for Payer Response   2/26/2024  4:17 PM  Case ID: [id number] Reply deadline: February 25, 2025 12:11 PM Sending user: Mili Velasquez LPN   Payer: [email or web address] [facility] [employer]    [phone]   [employer] Electronic PA Form (2017 NCPDP)   [employer] Prior Authorizations

## 2024-02-29 ENCOUNTER — TELEPHONE (OUTPATIENT)
Dept: DIABETES | Facility: CLINIC | Age: 49
End: 2024-02-29
Payer: COMMERCIAL

## 2024-02-29 ENCOUNTER — TELEPHONE (OUTPATIENT)
Dept: INTERNAL MEDICINE | Facility: CLINIC | Age: 49
End: 2024-02-29
Payer: COMMERCIAL

## 2024-02-29 NOTE — TELEPHONE ENCOUNTER
----- Message from Hugh Doyle sent at 2/29/2024 10:39 AM CST -----  Contact: [phone]@[email or web address]  Good morning patient would like a call back to discuss getting one of his med. Please give patient a call back [phone]

## 2024-03-04 ENCOUNTER — OFFICE VISIT (OUTPATIENT)
Dept: PRIMARY CARE CLINIC | Facility: CLINIC | Age: 49
End: 2024-03-04
Payer: COMMERCIAL

## 2024-03-04 VITALS
DIASTOLIC BLOOD PRESSURE: 80 MMHG | HEIGHT: 76 IN | WEIGHT: 198.63 LBS | TEMPERATURE: 98 F | RESPIRATION RATE: 18 BRPM | HEART RATE: 83 BPM | SYSTOLIC BLOOD PRESSURE: 120 MMHG | BODY MASS INDEX: 24.19 KG/M2 | OXYGEN SATURATION: 97 %

## 2024-03-04 DIAGNOSIS — L82.1 SEBORRHEIC KERATOSES: ICD-10-CM

## 2024-03-04 DIAGNOSIS — E11.42 TYPE 2 DIABETES MELLITUS WITH DIABETIC POLYNEUROPATHY, WITHOUT LONG-TERM CURRENT USE OF INSULIN: Primary | ICD-10-CM

## 2024-03-04 DIAGNOSIS — K59.00 CONSTIPATION, UNSPECIFIED CONSTIPATION TYPE: ICD-10-CM

## 2024-03-04 PROCEDURE — 4010F ACE/ARB THERAPY RXD/TAKEN: CPT | Mod: CPTII,S$GLB,, | Performed by: STUDENT IN AN ORGANIZED HEALTH CARE EDUCATION/TRAINING PROGRAM

## 2024-03-04 PROCEDURE — 99214 OFFICE O/P EST MOD 30 MIN: CPT | Mod: S$GLB,,, | Performed by: STUDENT IN AN ORGANIZED HEALTH CARE EDUCATION/TRAINING PROGRAM

## 2024-03-04 PROCEDURE — 99999 PR PBB SHADOW E&M-EST. PATIENT-LVL V: CPT | Mod: PBBFAC,,, | Performed by: STUDENT IN AN ORGANIZED HEALTH CARE EDUCATION/TRAINING PROGRAM

## 2024-03-04 RX ORDER — LISINOPRIL 20 MG/1
20 TABLET ORAL DAILY
Qty: 90 TABLET | Refills: 3 | Status: SHIPPED | OUTPATIENT
Start: 2024-03-04

## 2024-03-04 RX ORDER — METFORMIN HYDROCHLORIDE 500 MG/1
1000 TABLET, EXTENDED RELEASE ORAL 2 TIMES DAILY WITH MEALS
Qty: 360 TABLET | Refills: 3 | Status: SHIPPED | OUTPATIENT
Start: 2024-03-04 | End: 2025-03-04

## 2024-03-04 RX ORDER — MULTIVIT WITH MINERALS/HERBS
1 TABLET ORAL DAILY
COMMUNITY

## 2024-03-04 NOTE — PROGRESS NOTES
Subjective:       Patient ID: Alberto Song is a 48 y.o. male.    Chief Complaint: Follow-up (2 month follow up)      HPI:  48 y.o. male presents to [facility] for annual exam      Most recent HbA1c: 5.7% 11/4/2023  Home Reads?: Was using CGM, if not checking evening meal, will wake above 200. 200-250 usually.  Current Meds: empagliflozen 25 mg, metformin 1,000 mg bid   On Insulin?: insulin detemir 12 units daily, Insulin lispro daily at least with dinner SSI  Compliance: Not missing    Diet: No specific, no drinks with sugar  Exercises: Walking dogs      Patient has concerns for toe numbness to right anterior foot that began back in November 2023. Thought may be related to alcohol in past. Has stopped drinking past few months and continues to worsen. Most pronounced in 2 great toes. When going from sitting to standing positions will .    Patient has concerns for constipation which he feels may be due to Jardiance. Working well and would prefer to continue. Feels that dehydration may have been part of cause. Patient discontinued Jardiance about 1 week prior to colonoscopy for concerns with dehydration. 3-4 days of no defecation. Will increase in volume immensely by 4th day and very dense. Had bleeding with firm stool on one occasion. Once resuming Jardiance thirst and urination frequency has gone down, but again getting constipated.     Review of Systems   Constitutional:  Negative for chills, diaphoresis, fatigue and fever.   Respiratory:  Negative for chest tightness and shortness of breath.    Cardiovascular:  Negative for chest pain and palpitations.   Gastrointestinal:  Positive for constipation. Negative for abdominal pain, diarrhea, nausea and vomiting.   Skin:  Negative for rash and wound.   Neurological:  Positive for numbness (Bilateral toes). Negative for weakness.       Objective:      Vitals:    03/04/24 0828   BP: 120/80   BP Location: Left arm   Patient Position: Sitting   BP Method:  "Medium (Manual)   Pulse: 83   Resp: 18   Temp: 97.5 °F (36.4 °C)   TempSrc: Temporal   SpO2: 97%   Weight: 90.1 kg (198 lb 10.2 oz)   Height: 6' 4" (1.93 m)     Physical Exam  Vitals reviewed.   Constitutional:       General: He is not in acute distress.     Appearance: Normal appearance. He is not ill-appearing.   HENT:      Head: Normocephalic and atraumatic.   Eyes:      General:         Right eye: No discharge.         Left eye: No discharge.      Conjunctiva/sclera: Conjunctivae normal.   Cardiovascular:      Rate and Rhythm: Normal rate and regular rhythm.      Pulses: Normal pulses.           Dorsalis pedis pulses are 2+ on the right side and 2+ on the left side.      Heart sounds: Normal heart sounds. No murmur heard.  Pulmonary:      Effort: Pulmonary effort is normal.      Breath sounds: Normal breath sounds.   Musculoskeletal:         General: No deformity.      Cervical back: Neck supple. No rigidity.      Right foot: Normal range of motion. No deformity, bunion or Charcot foot.      Left foot: Normal range of motion. No deformity, bunion or Charcot foot.        Feet:    Feet:      Right foot:      Protective Sensation: 6 sites tested.  6 sites sensed.      Skin integrity: Skin integrity normal.      Left foot:      Protective Sensation: 8 sites tested.  6 sites sensed.      Skin integrity: Skin integrity normal.      Comments: No sensation to monofilament in red. Reduced sensation in green. Capillary refill <2 sec  Lymphadenopathy:      Cervical: No cervical adenopathy.   Skin:     General: Skin is warm and dry.      Coloration: Skin is not jaundiced.   Neurological:      General: No focal deficit present.      Mental Status: He is alert and oriented to person, place, and time.   Psychiatric:         Mood and Affect: Mood normal.         Behavior: Behavior normal.             Lab Results   Component Value Date     02/26/2024    K 4.3 02/26/2024     02/26/2024    CO2 23 02/26/2024    BUN 13 " "02/26/2024    CREATININE 0.9 02/26/2024    GLUCOSE 132 (H) 09/04/2022    ANIONGAP 11 02/26/2024     Lab Results   Component Value Date    HGBA1C 5.7 (H) 11/04/2023     No results found for: "BNP", "BNPTRIAGEBLO"    Lab Results   Component Value Date    WBC 6.18 02/26/2024    HGB 14.6 02/26/2024    HCT 43.8 02/26/2024    HCT 25 (L) 11/04/2023     02/26/2024    GRAN 2.8 02/26/2024    GRAN 45.9 02/26/2024     Lab Results   Component Value Date    CHOL 173 06/08/2023    HDL 42 06/08/2023    LDLCALC 117.2 06/08/2023    TRIG 69 06/08/2023          Current Outpatient Medications:     b complex vitamins tablet, Take 1 tablet by mouth once daily., Disp: , Rfl:     blood-glucose sensor (DEXCOM G6 SENSOR) Deisi, 1 sensor every 10 days, Disp: 3 each, Rfl: 11    blood-glucose transmitter (DEXCOM G6 TRANSMITTER) Deisi, 1 transmitter every 3 months, Disp: 1 each, Rfl: 4    glucagon (BAQSIMI) 3 mg/actuation Spry, 3 mg (one actuation) into a single nostril; if no response, may repeat in 15 minutes using a new intranasal device., Disp: 2 each, Rfl: 1    insulin detemir U-100, Levemir, (LEVEMIR FLEXPEN) 100 unit/mL (3 mL) InPn pen, Inject 25 Units into the skin every evening. Max TDD of 50 units. Titrate up to FBG <130. (Patient taking differently: Inject 25 Units into the skin once daily. Max TDD of 50 units. Titrate up to FBG <130.), Disp: 15 mL, Rfl: 6    insulin lispro 100 unit/mL injection, Inject into the skin as needed for High Blood Sugar. Sliding scale as needed, Disp: , Rfl:     insulin lispro 100 unit/mL pen, INJECT 1-10 UNITS BEFORE MEALS AND AT NIGHT, Disp: 9 mL, Rfl: 3    ketoconazole (NIZORAL) 2 % cream, , Disp: , Rfl:     empagliflozin (JARDIANCE) 25 mg tablet, Take 1 tablet (25 mg total) by mouth once daily., Disp: 90 tablet, Rfl: 3    lisinopriL (PRINIVIL,ZESTRIL) 20 MG tablet, Take 1 tablet (20 mg total) by mouth once daily., Disp: 90 tablet, Rfl: 3    metFORMIN (GLUCOPHAGE-XR) 500 MG ER 24hr tablet, Take 2 " tablets (1,000 mg total) by mouth 2 (two) times daily with meals., Disp: 360 tablet, Rfl: 3    OMNIPOD 5 G6 INTRO KIT, GEN 5, Crtg, Inject 1 kit into the skin Every 3 (three) days. (Patient not taking: Reported on 3/4/2024), Disp: 1 each, Rfl: 0    OMNIPOD 5 G6 PODS, GEN 5, Crtg, Inject 1 each into the skin Every 3 (three) days. (Patient not taking: Reported on 3/4/2024), Disp: 10 each, Rfl: 4  No current facility-administered medications for this visit.    Facility-Administered Medications Ordered in Other Visits:     0.9%  NaCl infusion, , Intravenous, Continuous, Frank Macias MD        Assessment:       1. Type 2 diabetes mellitus with diabetic polyneuropathy, without long-term current use of insulin    2. Seborrheic keratoses           Plan:       Type 2 diabetes mellitus with diabetic polyneuropathy, without long-term current use of insulin  -     metFORMIN (GLUCOPHAGE-XR) 500 MG ER 24hr tablet; Take 2 tablets (1,000 mg total) by mouth 2 (two) times daily with meals.  Dispense: 360 tablet; Refill: 3  -     lisinopriL (PRINIVIL,ZESTRIL) 20 MG tablet; Take 1 tablet (20 mg total) by mouth once daily.  Dispense: 90 tablet; Refill: 3  -     empagliflozin (JARDIANCE) 25 mg tablet; Take 1 tablet (25 mg total) by mouth once daily.  Dispense: 90 tablet; Refill: 3  -     Foot Exam Performed  - Diet and exercise reviewed. Doing well on current regimen. Keep follow-up with Endocrinology    Seborrheic keratoses  -     Ambulatory referral/consult to Dermatology; Future; Expected date: 03/11/2024    Constipation, unspecified constipation type  - Recommend increased fiber intake and titrate MiraLAX as needed. Continue with ood fluid intake    RTC in

## 2024-03-07 ENCOUNTER — PATIENT MESSAGE (OUTPATIENT)
Dept: PRIMARY CARE CLINIC | Facility: CLINIC | Age: 49
End: 2024-03-07
Payer: COMMERCIAL

## 2024-03-07 DIAGNOSIS — E11.42 TYPE 2 DIABETES MELLITUS WITH DIABETIC POLYNEUROPATHY, WITHOUT LONG-TERM CURRENT USE OF INSULIN: Primary | ICD-10-CM

## 2024-03-11 ENCOUNTER — TELEPHONE (OUTPATIENT)
Dept: DIABETES | Facility: CLINIC | Age: 49
End: 2024-03-11
Payer: COMMERCIAL

## 2024-03-19 ENCOUNTER — OFFICE VISIT (OUTPATIENT)
Dept: HEPATOLOGY | Facility: CLINIC | Age: 49
End: 2024-03-19
Payer: COMMERCIAL

## 2024-03-19 VITALS
DIASTOLIC BLOOD PRESSURE: 76 MMHG | OXYGEN SATURATION: 98 % | SYSTOLIC BLOOD PRESSURE: 122 MMHG | WEIGHT: 202.5 LBS | HEART RATE: 76 BPM | RESPIRATION RATE: 19 BRPM | HEIGHT: 76 IN | BODY MASS INDEX: 24.66 KG/M2

## 2024-03-19 DIAGNOSIS — K75.81 STEATOHEPATITIS: ICD-10-CM

## 2024-03-19 DIAGNOSIS — F10.10 ALCOHOL ABUSE: Primary | ICD-10-CM

## 2024-03-19 DIAGNOSIS — K70.9 ALCOHOLIC LIVER DISEASE: Primary | ICD-10-CM

## 2024-03-19 PROCEDURE — 3074F SYST BP LT 130 MM HG: CPT | Mod: CPTII,S$GLB,, | Performed by: INTERNAL MEDICINE

## 2024-03-19 PROCEDURE — 99999 PR PBB SHADOW E&M-EST. PATIENT-LVL V: CPT | Mod: PBBFAC,,, | Performed by: INTERNAL MEDICINE

## 2024-03-19 PROCEDURE — 1160F RVW MEDS BY RX/DR IN RCRD: CPT | Mod: CPTII,S$GLB,, | Performed by: INTERNAL MEDICINE

## 2024-03-19 PROCEDURE — 99214 OFFICE O/P EST MOD 30 MIN: CPT | Mod: S$GLB,,, | Performed by: INTERNAL MEDICINE

## 2024-03-19 PROCEDURE — 4010F ACE/ARB THERAPY RXD/TAKEN: CPT | Mod: CPTII,S$GLB,, | Performed by: INTERNAL MEDICINE

## 2024-03-19 PROCEDURE — 3008F BODY MASS INDEX DOCD: CPT | Mod: CPTII,S$GLB,, | Performed by: INTERNAL MEDICINE

## 2024-03-19 PROCEDURE — 3078F DIAST BP <80 MM HG: CPT | Mod: CPTII,S$GLB,, | Performed by: INTERNAL MEDICINE

## 2024-03-19 PROCEDURE — 1159F MED LIST DOCD IN RCRD: CPT | Mod: CPTII,S$GLB,, | Performed by: INTERNAL MEDICINE

## 2024-03-19 RX ORDER — SILDENAFIL 50 MG/1
50 TABLET, FILM COATED ORAL DAILY PRN
COMMUNITY
Start: 2024-02-25

## 2024-03-19 NOTE — PATIENT INSTRUCTIONS
Consider vaccination for hepatitis A  F/u with Dr Cobb as scheduled for pancreatic lesion  Repeat iron studies  Fibroscan  Labs every 3 months

## 2024-03-19 NOTE — PROGRESS NOTES
HEPATOLOGY FOLLOW UP    Referring Physician: Paolo Catalan MD   Current Corresponding Physician: Paolo Catalan MD     Alberto Song is here for follow up of Elevated Hepatic Enzymes      HPI  Alberto Song is a 48 y.o. malewho presented 12/4/23 for evaluation of elevated liver tests:     Labs 11/21/23: ALT 70, AST 38, ALKP 145, Tbil 1.1  PRISCILA-, ASMA-, AMA-, IgG not elevated, viral hep serologies not done     Abdo US w doppler 11/5/23: Hepatomegaly with hepatic steatosis.      --stopped drinking 11/4 or 11/5/23  --drank heavily ~2-3 years  -PAWEL detox 10/22-stopped x 4-6 months after 28 day inpt rehab     --cholecystectomy removed 02/23     The patient denied any symptoms of decompensated cirrhosis, including no ascites or edema, cognitive problems that would suggest hepatic encephalopathy, or GI bleeding from varices.      MELD 3.0: 23 at 11/10/2023  5:35 AM  MELD-Na: 22 at 11/10/2023  5:35 AM  Calculated from:  Serum Creatinine: 0.5 mg/dL (Using min of 1 mg/dL) at 11/10/2023  5:35 AM  Serum Sodium: 131 mmol/L at 11/10/2023  5:35 AM  Total Bilirubin: 13.1 mg/dL at 11/10/2023  5:35 AM  Serum Albumin: 2.2 g/dL at 11/10/2023  5:35 AM  INR(ratio): 1.2 at 11/10/2023  5:35 AM  Age at listing (hypothetical): 48 years  Sex: Male at 11/10/2023  5:35 AM    Interval History  Since Alberto Song's last visit:  --alcohol:    Serologic w/u: HCV Ab-, HBsAg-, HBcAb-, HBsAb+, HAV IgG-, PRISCILA-, ASMA-, AMA-, IgG low (not elevated), alpha 1 antitrypsin normal, iron sat 49%, ferritin 5865, Peth <10 (12/23/23)    Labs 3/11/24: ALT 13, AST 14, ALKP 92, Tbil 0.6, plts 172    MRI abdo w wo contrast 12/27/23: Hepatomegaly and hepatic steatosis.  No suspicious focal hepatic lesion. New mass along the tail of the pancreas.  In this patient with findings of acute pancreatitis on CT 01/05/2023, this could represent pseudocyst or splenic artery aneurysm.  Pancreatic mass (3.3 cm) is considered less likely but difficult to  exclude.  Further evaluation with pancreas protocol CT is recommended. Moderate splenomegaly.  CT abdo w wo contrast 12/29/23: Mild inflammatory change surrounding the pancreatic head and uncinate process, with redemonstration of the well-circumscribed lesion about the pancreatic tail measuring approximately 2.7 x 3.4 cm.  No definite appreciable enhancement or obvious connection to the adjacent splenic artery.  Findings remain nonspecific, with considerations including a pancreatic pseudocyst or walled-off collection.  Thrombosed splenic artery aneurysm and cystic pancreatic mass lesion felt unlikely, though not excluded.  Consider short-term interval follow-up imaging to ensure stability of this finding. Additional 2.9 cm focus about the lesser curvature of the stomach, without definite appreciable enhancement.  Findings may represent an additional walled-off fluid collection versus an enlarged lymph node.  Attention on follow-up imaging is recommended. Hepatosplenomegaly with hepatic steatosis.  Additional findings concerning for portal hypertension including multiple collateral vessels near the splenic hilum and gastroesophageal margins, as well as recanalization of the umbilical vein.  EUS 1/23/24: chronic pancreatitis, severe; 2.9 cm x 2.1 cm pancreatic tail lesion- s/p FNA-stromal fibrosis but no cancer; fatty liver    MELD 3.0: 7 at 3/11/2024 12:38 PM  MELD-Na: 7 at 3/11/2024 12:38 PM  Calculated from:  Serum Creatinine: 1.1 mg/dL at 3/11/2024 12:38 PM  Serum Sodium: 137 mmol/L at 3/11/2024 12:38 PM  Total Bilirubin: 0.6 mg/dL (Using min of 1 mg/dL) at 3/11/2024 12:38 PM  Serum Albumin: 4.1 g/dL (Using max of 3.5 g/dL) at 3/11/2024 12:38 PM  INR(ratio): 1.0 at 3/11/2024 12:38 PM  Age at listing (hypothetical): 48 years  Sex: Male at 3/11/2024 12:38 PM       Outpatient Encounter Medications as of 3/19/2024   Medication Sig Dispense Refill    b complex vitamins tablet Take 1 tablet by mouth once daily.       blood-glucose sensor (DEXCOM G6 SENSOR) Deisi 1 sensor every 10 days 3 each 11    blood-glucose transmitter (DEXCOM G6 TRANSMITTER) Deisi 1 transmitter every 3 months 1 each 4    empagliflozin (JARDIANCE) 25 mg tablet Take 1 tablet (25 mg total) by mouth once daily. 90 tablet 3    insulin detemir U-100, Levemir, (LEVEMIR FLEXPEN) 100 unit/mL (3 mL) InPn pen Inject 25 Units into the skin every evening. Max TDD of 50 units. Titrate up to FBG <130. (Patient taking differently: Inject 25 Units into the skin once daily. Max TDD of 50 units. Titrate up to FBG <130.) 15 mL 6    insulin lispro 100 unit/mL pen INJECT 1-10 UNITS BEFORE MEALS AND AT NIGHT 9 mL 3    ketoconazole (NIZORAL) 2 % cream       lisinopriL (PRINIVIL,ZESTRIL) 20 MG tablet Take 1 tablet (20 mg total) by mouth once daily. 90 tablet 3    metFORMIN (GLUCOPHAGE-XR) 500 MG ER 24hr tablet Take 2 tablets (1,000 mg total) by mouth 2 (two) times daily with meals. 360 tablet 3    sildenafiL (VIAGRA) 50 MG tablet Take 50 mg by mouth daily as needed.      glucagon (BAQSIMI) 3 mg/actuation Spry 3 mg (one actuation) into a single nostril; if no response, may repeat in 15 minutes using a new intranasal device. (Patient not taking: Reported on 3/19/2024) 2 each 1    insulin lispro 100 unit/mL injection Inject into the skin as needed for High Blood Sugar. Sliding scale as needed      OMNIPOD 5 G6 INTRO KIT, GEN 5, Crtg Inject 1 kit into the skin Every 3 (three) days. (Patient not taking: Reported on 3/4/2024) 1 each 0    OMNIPOD 5 G6 PODS, GEN 5, Crtg Inject 1 each into the skin Every 3 (three) days. (Patient not taking: Reported on 3/4/2024) 10 each 4    [DISCONTINUED] empagliflozin (JARDIANCE) 25 mg tablet Take 1 tablet (25 mg total) by mouth once daily. 90 tablet 2    [DISCONTINUED] folic acid (FOLVITE) 1 MG tablet Take 1 tablet (1 mg total) by mouth once daily. 30 tablet 2    [DISCONTINUED] lisinopriL (PRINIVIL,ZESTRIL) 20 MG tablet Take 1 tablet (20 mg total) by mouth  once daily. 30 tablet 2    [DISCONTINUED] metFORMIN (GLUCOPHAGE-XR) 500 MG ER 24hr tablet Take 2 tablets (1,000 mg total) by mouth 2 (two) times daily with meals. 360 tablet 2    [DISCONTINUED] rifAXIMin (XIFAXAN) 550 mg Tab Take 1 tablet (550 mg total) by mouth 2 (two) times daily. (Patient not taking: Reported on 11/21/2023) 60 tablet 2    [DISCONTINUED] thiamine 100 MG tablet Take 1 tablet (100 mg total) by mouth once daily. 30 tablet 2     Facility-Administered Encounter Medications as of 3/19/2024   Medication Dose Route Frequency Provider Last Rate Last Admin    0.9%  NaCl infusion   Intravenous Continuous Frank Macias MD         Review of patient's allergies indicates:  No Known Allergies  Past Medical History:   Diagnosis Date    Asthma     Diabetes mellitus     Hyperlipidemia     Hypertension     Sleep apnea, unspecified     Type 2 diabetes mellitus without complication, without long-term current use of insulin 6/6/2023       Review of Systems   Constitutional: Negative.    HENT: Negative.     Eyes: Negative.    Respiratory: Negative.     Cardiovascular: Negative.    Gastrointestinal: Negative.    Genitourinary: Negative.    Musculoskeletal: Negative.    Skin: Negative.    Neurological: Negative.    Psychiatric/Behavioral: Negative.       Vitals:    03/19/24 1003   BP: 122/76   Pulse: 76   Resp: 19       Physical Exam  Vitals reviewed.   Constitutional:       Appearance: He is well-developed.   HENT:      Head: Normocephalic and atraumatic.   Eyes:      General: No scleral icterus.     Conjunctiva/sclera: Conjunctivae normal.      Pupils: Pupils are equal, round, and reactive to light.   Neck:      Thyroid: No thyromegaly.   Cardiovascular:      Rate and Rhythm: Normal rate and regular rhythm.      Heart sounds: Normal heart sounds.   Pulmonary:      Effort: Pulmonary effort is normal.      Breath sounds: Normal breath sounds. No rales.   Abdominal:      General: Bowel sounds are normal. There  is no distension.      Palpations: Abdomen is soft. There is no mass.      Tenderness: There is no abdominal tenderness.   Musculoskeletal:         General: Normal range of motion.      Cervical back: Normal range of motion and neck supple.   Skin:     General: Skin is warm and dry.      Findings: No rash.   Neurological:      Mental Status: He is alert and oriented to person, place, and time.         MELD 3.0: 7 at 3/11/2024 12:38 PM  MELD-Na: 7 at 3/11/2024 12:38 PM  Calculated from:  Serum Creatinine: 1.1 mg/dL at 3/11/2024 12:38 PM  Serum Sodium: 137 mmol/L at 3/11/2024 12:38 PM  Total Bilirubin: 0.6 mg/dL (Using min of 1 mg/dL) at 3/11/2024 12:38 PM  Serum Albumin: 4.1 g/dL (Using max of 3.5 g/dL) at 3/11/2024 12:38 PM  INR(ratio): 1.0 at 3/11/2024 12:38 PM  Age at listing (hypothetical): 48 years  Sex: Male at 3/11/2024 12:38 PM      Lab Results   Component Value Date     (H) 03/11/2024    BUN 15 03/11/2024    CREATININE 1.1 03/11/2024    CALCIUM 9.3 03/11/2024     03/11/2024    K 4.5 03/11/2024     03/11/2024    PROT 7.2 03/11/2024    CO2 24 03/11/2024    ANIONGAP 10 03/11/2024    WBC 5.13 03/11/2024    RBC 5.11 03/11/2024    HGB 14.6 03/11/2024    HCT 45.3 03/11/2024    HCT 25 (L) 11/04/2023    MCV 89 03/11/2024    MCH 28.6 03/11/2024    MCHC 32.2 03/11/2024     Lab Results   Component Value Date    RDW 14.5 03/11/2024     03/11/2024    MPV 9.4 03/11/2024    GRAN 2.6 03/11/2024    GRAN 50.7 03/11/2024    LYMPH 1.8 03/11/2024    LYMPH 34.9 03/11/2024    MONO 0.4 03/11/2024    MONO 7.2 03/11/2024    EOSINOPHIL 6.0 03/11/2024    BASOPHIL 0.8 03/11/2024    EOS 0.3 03/11/2024    BASO 0.04 03/11/2024    APTT 26.2 11/05/2023    CHOL 173 06/08/2023    TRIG 69 06/08/2023    HDL 42 06/08/2023    CHOLHDL 24.3 06/08/2023    TOTALCHOLEST 4.1 06/08/2023    ALBUMIN 4.1 03/11/2024    BILIDIR 0.2 03/11/2024    AST 14 03/11/2024    ALT 13 03/11/2024    ALKPHOS 92 03/11/2024    MG 1.9 11/21/2023     LABPROT 11.4 03/11/2024    INR 1.0 03/11/2024       Assessment and Plan:  Alberto Song is a 48 y.o. male with Elevated Hepatic Enzymes  Current recommendations:  Elevated liver tests:His liver function has improved with abstinence. Suspect alcoholic liver disease as cause of previous liver dysfunction. Continue to check liver function/meld labs every 12 weeks. Fibroscan now to screen for cirrhosis. Recommend vaccination for HAV.  Elevated iron sat and ferritin: Recommend repeat iron studies and test for hemochromatosis since had elevated ferritin and iron sat previously.   Alcohol abuse: remain abstiment; pt understands that if he starts drinking again he would not be a candidate for liver transplant with ongoing alcohol abuse  Pancreatic lesion: likely due to pancreatitis. F/u with [facility] as scheduled.  Return 4 months  A total of 35 minutes was spent reviewing the patient's chart, examining the patient, reviewing labs and imaging and coordinating care with the patient's care team.

## 2024-03-22 ENCOUNTER — PROCEDURE VISIT (OUTPATIENT)
Dept: HEPATOLOGY | Facility: CLINIC | Age: 49
End: 2024-03-22
Payer: COMMERCIAL

## 2024-03-22 ENCOUNTER — PATIENT MESSAGE (OUTPATIENT)
Dept: HEPATOLOGY | Facility: CLINIC | Age: 49
End: 2024-03-22
Payer: COMMERCIAL

## 2024-03-22 DIAGNOSIS — F10.10 ALCOHOL ABUSE: ICD-10-CM

## 2024-03-22 PROCEDURE — 91200 LIVER ELASTOGRAPHY: CPT | Mod: S$GLB,,, | Performed by: INTERNAL MEDICINE

## 2024-03-22 NOTE — PROCEDURES
FibroScan Transplant Hepatology    Date/Time: 3/22/2024 9:45 AM    Performed by: Harini London MD  Authorized by: Harini London MD    Diagnosis:  Alcohol    Probe:  M    Universal Protocol: Patient's identity, procedure and site were verified, confirmatory pause was performed.  Discussed procedure including risks and potential complications.  Questions answered.  Patient verbalizes understanding and wishes to proceed with VCTE.     Procedure: After providing explanations of the procedure, patient was placed in the supine position with right arm in maximum abduction to allow optimal exposure of right lateral abdomen.  Patient was briefly assessed, Testing was performed in the mid-axillary location, 50Hz Shear Wave pulses were applied and the resulting Shear Wave and Propagation Speed detected with a 3.5 MHz ultrasonic signal, using the FibroScan probe, Skin to liver capsule distance and liver parenchyma were accessed during the entire examination with the FibroScan probe, Patient was instructed to breathe normally and to abstain from sudden movements during the procedure, allowing for random measurements of liver stiffness. At least 10 Shear Waves were produced, Individual measurements of each Shear Wave were calculated.  Patient tolerated the procedure well with no complications.  Meets discharge criteria as was dismissed.  Rates pain 0 out of 10.  Patient will follow up with ordering provider to review results.    Findings  Median liver stiffness score:  12.2  CAP Reading: dB/m:  251    IQR/med %:  11  Interpretation  Fibrosis interpretation is based on medial liver stiffness - Kilopascal (kPa).    Fibrosis Stage:  F3  Steatosis interpretation is based on controlled attenuation parameter - (dB/m).    Steatosis Grade:  S1  Comments/Plan:  11-34% steatosis

## 2024-03-25 ENCOUNTER — TELEPHONE (OUTPATIENT)
Dept: HEPATOLOGY | Facility: CLINIC | Age: 49
End: 2024-03-25
Payer: COMMERCIAL

## 2024-03-28 ENCOUNTER — TELEPHONE (OUTPATIENT)
Dept: HEPATOLOGY | Facility: CLINIC | Age: 49
End: 2024-03-28
Payer: COMMERCIAL

## 2024-04-02 ENCOUNTER — TELEPHONE (OUTPATIENT)
Dept: INTERNAL MEDICINE | Facility: CLINIC | Age: 49
End: 2024-04-02
Payer: COMMERCIAL

## 2024-04-02 DIAGNOSIS — Z79.4 TYPE 2 DIABETES MELLITUS WITH HYPERGLYCEMIA, WITH LONG-TERM CURRENT USE OF INSULIN: Primary | ICD-10-CM

## 2024-04-02 DIAGNOSIS — E11.65 TYPE 2 DIABETES MELLITUS WITH HYPERGLYCEMIA, WITH LONG-TERM CURRENT USE OF INSULIN: Primary | ICD-10-CM

## 2024-04-03 ENCOUNTER — TELEPHONE (OUTPATIENT)
Dept: INTERNAL MEDICINE | Facility: CLINIC | Age: 49
End: 2024-04-03
Payer: COMMERCIAL

## 2024-04-03 DIAGNOSIS — Z79.4 TYPE 2 DIABETES MELLITUS WITH HYPERGLYCEMIA, WITH LONG-TERM CURRENT USE OF INSULIN: Primary | ICD-10-CM

## 2024-04-03 DIAGNOSIS — E11.65 TYPE 2 DIABETES MELLITUS WITH HYPERGLYCEMIA, WITH LONG-TERM CURRENT USE OF INSULIN: Primary | ICD-10-CM

## 2024-04-03 DIAGNOSIS — E11.9 TYPE 2 DIABETES MELLITUS WITHOUT COMPLICATION, WITHOUT LONG-TERM CURRENT USE OF INSULIN: ICD-10-CM

## 2024-04-03 RX ORDER — INSULIN LISPRO 100 [IU]/ML
INJECTION, SOLUTION INTRAVENOUS; SUBCUTANEOUS
Qty: 30 ML | Refills: 6 | Status: SHIPPED | OUTPATIENT
Start: 2024-04-03

## 2024-04-03 NOTE — TELEPHONE ENCOUNTER
Prior authorization approved Case ID: [id number]      Payer: [place] [employer] [facility] [employer]   [place] Case: [id number], Status: Approved, Coverage Starts on: 4/3/2024 12:00:00 AM, Coverage Ends on: 5/3/2024 12:00:00 AM.   Approval Details    Authorized from April 3, 2024 to May 3, 2024      Electronic appeal: Not supported   Prior auth initiated by: [facility] #[id number] - [place], [place] - [address] [place] [address] AVE AT [address]    [phone]   View History      Notes     Time User Attachment    Attachment received from payer. 4/3/2024  3:31 PM Interface, [employer] Covermymeds Incoming Document

## 2024-04-04 ENCOUNTER — PATIENT MESSAGE (OUTPATIENT)
Dept: DIABETES | Facility: CLINIC | Age: 49
End: 2024-04-04
Payer: COMMERCIAL

## 2024-04-04 ENCOUNTER — TELEPHONE (OUTPATIENT)
Dept: DIABETES | Facility: CLINIC | Age: 49
End: 2024-04-04
Payer: COMMERCIAL

## 2024-04-04 RX ORDER — INSULIN PMP CART,AUT,G6/7,CNTR
1 EACH SUBCUTANEOUS
Qty: 1 EACH | Refills: 0 | Status: SHIPPED | OUTPATIENT
Start: 2024-04-04 | End: 2024-04-22

## 2024-04-04 RX ORDER — INSULIN PMP CART,AUT,G6/7,CNTR
1 EACH SUBCUTANEOUS
Qty: 6 EACH | Refills: 3 | Status: SHIPPED | OUTPATIENT
Start: 2024-04-04 | End: 2024-04-22 | Stop reason: SDUPTHER

## 2024-04-04 NOTE — TELEPHONE ENCOUNTER
Lets send the order to [facility] pharmacy   Tell him to call [facility] and ask for the FREE trial and he will get the starter kit for free   [phone]  Call if you have issues

## 2024-04-04 NOTE — TELEPHONE ENCOUNTER
Reached out to pt to inform pt that his pods were approved, but pt stated that his intro kit was denied , stated to pt I would inform provider, message sent to provider

## 2024-04-09 ENCOUNTER — PATIENT MESSAGE (OUTPATIENT)
Dept: DIABETES | Facility: CLINIC | Age: 49
End: 2024-04-09
Payer: COMMERCIAL

## 2024-04-19 ENCOUNTER — TELEPHONE (OUTPATIENT)
Dept: ENDOSCOPY | Facility: HOSPITAL | Age: 49
End: 2024-04-19
Payer: COMMERCIAL

## 2024-04-22 ENCOUNTER — OFFICE VISIT (OUTPATIENT)
Dept: DIABETES | Facility: CLINIC | Age: 49
End: 2024-04-22
Payer: COMMERCIAL

## 2024-04-22 VITALS
SYSTOLIC BLOOD PRESSURE: 116 MMHG | HEART RATE: 72 BPM | DIASTOLIC BLOOD PRESSURE: 81 MMHG | OXYGEN SATURATION: 99 % | WEIGHT: 203.63 LBS | HEIGHT: 76 IN | BODY MASS INDEX: 24.8 KG/M2

## 2024-04-22 DIAGNOSIS — I10 PRIMARY HYPERTENSION: ICD-10-CM

## 2024-04-22 DIAGNOSIS — E78.5 DYSLIPIDEMIA, GOAL LDL BELOW 100: ICD-10-CM

## 2024-04-22 DIAGNOSIS — E11.9 TYPE 2 DIABETES MELLITUS WITHOUT COMPLICATION, WITHOUT LONG-TERM CURRENT USE OF INSULIN: ICD-10-CM

## 2024-04-22 DIAGNOSIS — Z87.19 HISTORY OF PANCREATITIS: ICD-10-CM

## 2024-04-22 DIAGNOSIS — Z79.4 TYPE 2 DIABETES MELLITUS WITH HYPERGLYCEMIA, WITH LONG-TERM CURRENT USE OF INSULIN: Primary | ICD-10-CM

## 2024-04-22 DIAGNOSIS — E13.9 LADA (LATENT AUTOIMMUNE DIABETES OF ADULTHOOD): ICD-10-CM

## 2024-04-22 DIAGNOSIS — Z71.9 HEALTH EDUCATION/COUNSELING: ICD-10-CM

## 2024-04-22 DIAGNOSIS — E11.65 TYPE 2 DIABETES MELLITUS WITH HYPERGLYCEMIA, WITH LONG-TERM CURRENT USE OF INSULIN: Primary | ICD-10-CM

## 2024-04-22 LAB — GLUCOSE SERPL-MCNC: 125 MG/DL (ref 70–110)

## 2024-04-22 PROCEDURE — 1160F RVW MEDS BY RX/DR IN RCRD: CPT | Mod: CPTII,S$GLB,, | Performed by: NURSE PRACTITIONER

## 2024-04-22 PROCEDURE — 3079F DIAST BP 80-89 MM HG: CPT | Mod: CPTII,S$GLB,, | Performed by: NURSE PRACTITIONER

## 2024-04-22 PROCEDURE — 3008F BODY MASS INDEX DOCD: CPT | Mod: CPTII,S$GLB,, | Performed by: NURSE PRACTITIONER

## 2024-04-22 PROCEDURE — 1159F MED LIST DOCD IN RCRD: CPT | Mod: CPTII,S$GLB,, | Performed by: NURSE PRACTITIONER

## 2024-04-22 PROCEDURE — 3074F SYST BP LT 130 MM HG: CPT | Mod: CPTII,S$GLB,, | Performed by: NURSE PRACTITIONER

## 2024-04-22 PROCEDURE — 99214 OFFICE O/P EST MOD 30 MIN: CPT | Mod: S$GLB,,, | Performed by: NURSE PRACTITIONER

## 2024-04-22 PROCEDURE — 3046F HEMOGLOBIN A1C LEVEL >9.0%: CPT | Mod: CPTII,S$GLB,, | Performed by: NURSE PRACTITIONER

## 2024-04-22 PROCEDURE — 95251 CONT GLUC MNTR ANALYSIS I&R: CPT | Mod: S$GLB,,, | Performed by: NURSE PRACTITIONER

## 2024-04-22 PROCEDURE — 4010F ACE/ARB THERAPY RXD/TAKEN: CPT | Mod: CPTII,S$GLB,, | Performed by: NURSE PRACTITIONER

## 2024-04-22 PROCEDURE — 99999 PR PBB SHADOW E&M-EST. PATIENT-LVL V: CPT | Mod: PBBFAC,,, | Performed by: NURSE PRACTITIONER

## 2024-04-22 PROCEDURE — 82962 GLUCOSE BLOOD TEST: CPT | Mod: S$GLB,,, | Performed by: NURSE PRACTITIONER

## 2024-04-22 RX ORDER — BLOOD-GLUCOSE SENSOR
EACH MISCELLANEOUS
Qty: 9 EACH | Refills: 3 | Status: SHIPPED | OUTPATIENT
Start: 2024-04-22

## 2024-04-22 RX ORDER — BLOOD-GLUCOSE TRANSMITTER
EACH MISCELLANEOUS
Qty: 1 EACH | Refills: 4 | Status: SHIPPED | OUTPATIENT
Start: 2024-04-22

## 2024-04-22 RX ORDER — INSULIN PMP CART,AUT,G6/7,CNTR
1 EACH SUBCUTANEOUS
Qty: 9 EACH | Refills: 3 | Status: SHIPPED | OUTPATIENT
Start: 2024-04-22

## 2024-04-22 RX ORDER — INSULIN PMP CART,AUT,G6/7,CNTR
1 EACH SUBCUTANEOUS
Qty: 9 EACH | Refills: 3 | Status: SHIPPED | OUTPATIENT
Start: 2024-04-22 | End: 2024-04-22 | Stop reason: SDUPTHER

## 2024-04-22 NOTE — PROGRESS NOTES
CC:   Chief Complaint   Patient presents with    Diabetes Mellitus       HPI: Alberto Song is a 48 y.o. male presents for a follow up visit today for the management of Diabetes   This is his first visit with me   He was seen at [facility]- but seen last in June 2023     He was diagnosed with Type 2 diabetes in January 2023  when he was admitted for alcoholic/gallstone pancreatitis underwent cholecystectomy -- he was sent home on insulin therapy     C-peptide 3.87 with  when seen outpatient in 2/2023.   C-peptide 2 -- 12/2023-- he was fasting at the but no glucose to compare -- he reports his BG was 290     Insulin antibody negative  JAMISON - +     Family hx of diabetes: denies   Hospitalized for diabetes: denies       No personal or FH of thyroid cancer or personal of pancreatic cancer    + personal Hx of pancreatitis.   + pancreatic cyst     Follows with hepatology-- seen last in march 2024   --stopped drinking 11/4 or 11/5/23  --drank heavily ~2-3 years  -[facility] detox 10/22-stopped x 4-6 months after 28 day inpt rehab    Assessment and Plan:  Alberto Song is a 48 y.o. male with Elevated Hepatic Enzymes  Current recommendations:  Elevated liver tests:His liver function has improved with abstinence. Suspect alcoholic liver disease as cause of previous liver dysfunction. Continue to check liver function/meld labs every 12 weeks. Fibroscan now to screen for cirrhosis. Recommend vaccination for HAV.  Elevated iron sat and ferritin: Recommend repeat iron studies and test for hemochromatosis since had elevated ferritin and iron sat previously.   Alcohol abuse: remain abstiment; pt understands that if he starts drinking again he would not be a candidate for liver transplant with ongoing alcohol abuse  Pancreatic lesion: likely due to pancreatitis. F/u with [facility] as scheduled.  Return 4 months    MRI- 12/27:  Impression:     Hepatomegaly and hepatic steatosis.  No suspicious focal hepatic lesion.     New  mass along the tail of the pancreas.  In this patient with findings of acute pancreatitis on CT 01/05/2023, this could represent pseudocyst or splenic artery aneurysm.  Pancreatic mass is considered less likely but difficult to exclude.  Further evaluation with pancreas protocol CT is recommended.     Moderate splenomegaly.     This report was flagged in Epic as abnormal.      Our last visit was in January of 2024  At that visit we continued the metformin a 1000 mg twice a day, continue the Jardiance 25 mg daily, adjusted the Levemir to 25 units daily  Adjusted his Humalog sliding scale  Started the Dexcom G6  Scheduled him to meet with diabetes education for an insulin pump evaluation  He met with Sara for an insulin pump evaluation in February of 2024  At that time he was interested in the Omnipod 5  The Omnipod 5 with sent to the pharmacy but he has not started it    See attached Dexcom download.  Blood sugar readings are above goal    He did start the dexcom G6- but had some sensor issues   Sounds like some user issues   Threw away his transmitter   Had alerts in the middle of the night that was waking him up - that was false   Now has a new sensor on and it seems to be reading well  Intermittent reliable and accuracy      He presents with his box of Omnipod 5 and a vial of insulin.  He is scheduled to start his omnipod 5 next month with Sara     Recent A1c is 9.3%       Addendum--04/29/2024  C-peptide level 3.18--with a blood sugar of 311            Has had some data interruption issues           DIABETES COMPLICATIONS: none      Diabetes Management Status    ASA:  No    Statin: Not taking  ACE/ARB: Taking-- lisinopril 20 mg daily     Screening or Prevention Patient's value Goal Complete/Controlled?   HgA1C Testing and Control   Lab Results   Component Value Date    HGBA1C 9.3 (H) 04/01/2024      Annually/Less than 8% Yes   Lipid profile : 06/08/2023 Annually Yes   LDL control Lab Results   Component Value  Date    LDLCALC 117.2 06/08/2023    Annually/Less than 100 mg/dl  No   Nephropathy screening Lab Results   Component Value Date    LABMICR 34.0 06/08/2023     Lab Results   Component Value Date    PROTEINUA Negative 11/10/2023    Annually Yes   Blood pressure BP Readings from Last 1 Encounters:   04/22/24 116/81    Less than 140/90 Yes   Dilated retinal exam : 11/08/2023 Annually Yes   Foot exam   : 03/04/2024 Annually Yes       CURRENT A1C:    Hemoglobin A1C   Date Value Ref Range Status   04/01/2024 9.3 (H) 4.0 - 5.6 % Final     Comment:     ADA Screening Guidelines:  5.7-6.4%  Consistent with prediabetes  >or=6.5%  Consistent with diabetes    High levels of fetal hemoglobin interfere with the HbA1C  assay. Heterozygous hemoglobin variants (HbS, HgC, etc)do  not significantly interfere with this assay.   However, presence of multiple variants may affect accuracy.     11/04/2023 5.7 (H) 4.0 - 5.6 % Final     Comment:     ADA Screening Guidelines:  5.7-6.4%  Consistent with prediabetes  >or=6.5%  Consistent with diabetes    High levels of fetal hemoglobin interfere with the HbA1C  assay. Heterozygous hemoglobin variants (HbS, HgC, etc)do  not significantly interfere with this assay.   However, presence of multiple variants may affect accuracy.     06/08/2023 5.6 4.0 - 5.6 % Final     Comment:     ADA Screening Guidelines:  5.7-6.4%  Consistent with prediabetes  >or=6.5%  Consistent with diabetes    High levels of fetal hemoglobin interfere with the HbA1C  assay. Heterozygous hemoglobin variants (HbS, HgC, etc)do  not significantly interfere with this assay.   However, presence of multiple variants may affect accuracy.     06/08/2023 5.6 4.0 - 5.6 % Final     Comment:     ADA Screening Guidelines:  5.7-6.4%  Consistent with prediabetes  >or=6.5%  Consistent with diabetes    High levels of fetal hemoglobin interfere with the HbA1C  assay. Heterozygous hemoglobin variants (HbS, HgC, etc)do  not significantly interfere  with this assay.   However, presence of multiple variants may affect accuracy.         GOAL A1C: 7%       DM MEDICATIONS USED IN THE PAST: Metformin   Jardiance   Levemir    Humalog/lispro   Dexcom G6         CURRENT DIABETES MEDICATIONS: Jardiance 25 mg daily   Metformin XR 1000 mg BID ( 4 tablets per day)   Levemir 20 units daily   Lispro sliding scale if BG is >220-250's -- highest would be 6 units      Insulin: vial and syringe    Missed doses: denies       Omnipod 5 pods are going to come from [facility]       BLOOD GLUCOSE MONITORING:    Sensor type: Dexcom G6   Average BG readin    Time in range: 43%   37% high   19% very high   <1% low   <1% very low   Site change:  q10 days    Sensor was downloaded in clinic today and reviewed with patient.   Please see attached document for download.       Fingerstick in office -- on dexcom is readings 136   Results for orders placed or performed in visit on 24   POCT Glucose, Hand-Held Device   Result Value Ref Range    POC Glucose 125 (A) 70 - 110 MG/DL       Dexom supplies- from [employer]     HYPOGLYCEMIA: <1% low   <1% very low    Glucagon kit: denies   Medic alert bracelet: denies         MEALS: eating 3 meals per day   Pasta   [place]   Breads   Eating   Drinks: ocean spray 5 nader drinks - SF   SF sodas   Water   Coffee - black        CURRENT EXERCISE:  No      Review of Systems  Review of Systems   Constitutional:  Negative for appetite change, fatigue and unexpected weight change.   HENT:  Negative for trouble swallowing.    Eyes:  Negative for visual disturbance.   Respiratory:  Negative for shortness of breath.    Cardiovascular:  Negative for chest pain.   Gastrointestinal:  Negative for nausea.   Endocrine: Negative for polydipsia, polyphagia and polyuria.   Genitourinary:         No Nocturia    Skin:  Negative for wound.   Neurological:  Positive for numbness (to feet).       Physical Exam   Physical Exam  Vitals and nursing note reviewed.    Constitutional:       Appearance: He is well-developed.   HENT:      Head: Normocephalic and atraumatic.      Right Ear: External ear normal.      Left Ear: External ear normal.      Nose: Nose normal.   Neck:      Thyroid: No thyromegaly.      Trachea: No tracheal deviation.   Cardiovascular:      Rate and Rhythm: Normal rate and regular rhythm.      Heart sounds: No murmur heard.  Pulmonary:      Effort: Pulmonary effort is normal. No respiratory distress.      Breath sounds: Normal breath sounds.   Abdominal:      Palpations: Abdomen is soft.      Tenderness: There is no abdominal tenderness.      Hernia: No hernia is present.   Musculoskeletal:      Cervical back: Normal range of motion and neck supple.   Skin:     General: Skin is warm and dry.      Capillary Refill: Capillary refill takes less than 2 seconds.      Findings: No rash.      Comments: Injection sites are normal appearing. No lipo hypertropthy or atrophy     Neurological:      Mental Status: He is alert and oriented to person, place, and time.      Cranial Nerves: No cranial nerve deficit.   Psychiatric:         Behavior: Behavior normal.         Judgment: Judgment normal.         FOOT EXAMINATION: Appropriate footwear         Lab Results   Component Value Date    TSH 1.810 11/04/2023           LESLYE (latent autoimmune diabetes of adulthood)  Uncontrolled   A1c has increased   +JAMISON but C-peptide WNL --- may have some underlying insulin def at times -- but already on insulin  He wants to start the Omnipod 5 - he has the supplies. He needs to meet with education to start the device       -- Medication Changes:   Continue Metformin XR 1000 mg 2 times per day     Continue Jardiance 25 mg daily     Continue dexcom G6     Start omnipod 5  Omnipod 5 with  Humalog/lispro   Basal: 1 unit/hr   ICR: 1:10- set doses with meals -3/3/5--30/30/50 grams of carbs   ISF: 1:45  Target 120  IOB 3.5 hours       -- Reviewed goals of therapy are to get the best control  we can without hypoglycemia.  -- Reviewed patient's current insulin regimen. Clarified proper insulin dose and timing in relation to meals, etc. Insulin injection sites and proper rotation instructed.    -- Advised frequent self blood glucose monitoring.  Patient encouraged to document glucose results and bring them to every clinic visit. Continue dexcom G6   -- Hypoglycemia precautions discussed. Instructed on precautions before driving.    -- Call for Bg repeatedly < 70 or > 180.   -- Close adherence to lifestyle changes recommended.   -- Periodic follow ups for eye evaluations, foot care and dental care suggested.  -- Refer to diabetes education-- 2 visit   Schedule a pump start follow up 1 week after he start his omnipod 5 with moira next month         Patient has diabetes mellitus and manages diabetes with intensive insulin regimen and uses prandial and basal insulin daily  Patient requires a therapeutic CGM and is willing to use therapeutic CGM for the necessary frequent adjustments of insulin therapy.  I have completed an in-person visit during the previous 6 months and will continue to have in-person visits every 6 months to assess adherence to their CGM regimen and diabetes treatment plan.  Due to COVID pandemic and need for remote monitoring this tool is medically necessary        Primary hypertension  BP goal is < 140/90.   Tolerating ACEi  Controlled   Blood pressure goals discussed with patient      History of pancreatitis  Avoiding DPP4 and GLP1   May affect insulin production     Dyslipidemia, goal LDL below 100  Lipids with RTC           I spent a total of  30 minutes on the day of the visit.This includes face to face time and non-face to face time preparing to see the patient (eg, review of tests), obtaining and/or reviewing separately obtained history, documenting clinical information in the electronic or other health record, independently interpreting results and communicating results to the  patient/family/caregiver, or care coordinator.        Follow up in about 3 months (around 7/22/2024).  1. Schedule a pump start follow up 1 week after he start his omnipod 5 with moira next month   2. Follow up with me in 3 months with labs prior -- CMP, lipids, and A1c   3. C-peptide level and BMP in the next week or so         Orders Placed This Encounter   Procedures    Basic Metabolic Panel     Standing Status:   Future     Standing Expiration Date:   10/22/2025    C-Peptide     Standing Status:   Future     Standing Expiration Date:   10/22/2025    Hemoglobin A1C     Standing Status:   Future     Standing Expiration Date:   10/22/2025    Comprehensive Metabolic Panel     Standing Status:   Future     Standing Expiration Date:   10/22/2025    Lipid Panel     Standing Status:   Future     Standing Expiration Date:   10/22/2025    Microalbumin/Creatinine Ratio, Urine     Standing Status:   Future     Standing Expiration Date:   10/22/2025     Order Specific Question:   Specimen Source     Answer:   Urine    POCT Glucose, Hand-Held Device       Recommendations were discussed with the patient in detail  The patient verbalized understanding and agrees with the plan outlined as above.     This note was partly generated with [employer] voice recognition software. I apologize for any possible typographical errors.

## 2024-04-24 PROBLEM — E13.9 LADA (LATENT AUTOIMMUNE DIABETES OF ADULTHOOD): Status: ACTIVE | Noted: 2023-06-06

## 2024-04-24 NOTE — ASSESSMENT & PLAN NOTE
Uncontrolled   A1c has increased   +JAMISON but C-peptide WNL --- may have some underlying insulin def at times -- but already on insulin  He wants to start the Omnipod 5 - he has the supplies. He needs to meet with education to start the device       -- Medication Changes:   Continue Metformin XR 1000 mg 2 times per day     Continue Jardiance 25 mg daily     Continue dexcom G6     Start omnipod 5  Omnipod 5 with  Humalog/lispro   Basal: 1 unit/hr   ICR: 1:10- set doses with meals -3/3/5--30/30/50 grams of carbs   ISF: 1:45  Target 120  IOB 3.5 hours       -- Reviewed goals of therapy are to get the best control we can without hypoglycemia.  -- Reviewed patient's current insulin regimen. Clarified proper insulin dose and timing in relation to meals, etc. Insulin injection sites and proper rotation instructed.    -- Advised frequent self blood glucose monitoring.  Patient encouraged to document glucose results and bring them to every clinic visit. Continue dexcom G6   -- Hypoglycemia precautions discussed. Instructed on precautions before driving.    -- Call for Bg repeatedly < 70 or > 180.   -- Close adherence to lifestyle changes recommended.   -- Periodic follow ups for eye evaluations, foot care and dental care suggested.  -- Refer to diabetes education-- 2 visit   Schedule a pump start follow up 1 week after he start his omnipod 5 with moira next month         Patient has diabetes mellitus and manages diabetes with intensive insulin regimen and uses prandial and basal insulin daily  Patient requires a therapeutic CGM and is willing to use therapeutic CGM for the necessary frequent adjustments of insulin therapy.  I have completed an in-person visit during the previous 6 months and will continue to have in-person visits every 6 months to assess adherence to their CGM regimen and diabetes treatment plan.  Due to COVID pandemic and need for remote monitoring this tool is medically necessary

## 2024-05-14 PROBLEM — L03.90 CELLULITIS: Status: ACTIVE | Noted: 2024-05-14

## 2024-05-15 ENCOUNTER — TELEPHONE (OUTPATIENT)
Dept: PRIMARY CARE CLINIC | Facility: CLINIC | Age: 49
End: 2024-05-15
Payer: COMMERCIAL

## 2024-05-15 NOTE — TELEPHONE ENCOUNTER
Spoke to patient and explained that Dr. Catalan can eval cyst in a visit. Patient understood that once provider evals he decide whether it can be lanced or refer out to specialist. Scheduled patient for an appt. 5/22/24 with Dr. Catalan.

## 2024-05-15 NOTE — TELEPHONE ENCOUNTER
----- Message from Lizzy Hernández MA sent at 5/14/2024  4:55 PM CDT -----  Contact: [phone]    ----- Message -----  From: Svitlana Olsen  Sent: 5/14/2024   4:32 PM CDT  To: Hossein HOANG Staff    [email or web address]     Patient is calling for Medical Advice regarding:    How long has patient had these symptoms:    Pharmacy name and phone#:    Would like response via [email or web address]:  ##    Comments:  Pt states he has a cyst he states it went from a jelly bean size and it has got bigger and discolored and grown in size and is asking who should he see in regards to getting this lanced please I've return call he states it feels like he is sitting ion a ping pong ball

## 2024-05-21 ENCOUNTER — OFFICE VISIT (OUTPATIENT)
Dept: PRIMARY CARE CLINIC | Facility: CLINIC | Age: 49
End: 2024-05-21
Payer: COMMERCIAL

## 2024-05-21 ENCOUNTER — NUTRITION (OUTPATIENT)
Dept: DIABETES | Facility: CLINIC | Age: 49
End: 2024-05-21
Payer: COMMERCIAL

## 2024-05-21 VITALS
HEIGHT: 76 IN | SYSTOLIC BLOOD PRESSURE: 112 MMHG | HEART RATE: 84 BPM | WEIGHT: 202.06 LBS | RESPIRATION RATE: 18 BRPM | BODY MASS INDEX: 24.6 KG/M2 | OXYGEN SATURATION: 94 % | DIASTOLIC BLOOD PRESSURE: 66 MMHG

## 2024-05-21 DIAGNOSIS — E11.65 TYPE 2 DIABETES MELLITUS WITH HYPERGLYCEMIA, WITH LONG-TERM CURRENT USE OF INSULIN: ICD-10-CM

## 2024-05-21 DIAGNOSIS — L02.214 INGUINAL ABSCESS: Primary | ICD-10-CM

## 2024-05-21 DIAGNOSIS — E11.9 TYPE 2 DIABETES MELLITUS WITHOUT COMPLICATION, WITHOUT LONG-TERM CURRENT USE OF INSULIN: Primary | ICD-10-CM

## 2024-05-21 DIAGNOSIS — Z79.4 TYPE 2 DIABETES MELLITUS WITH HYPERGLYCEMIA, WITH LONG-TERM CURRENT USE OF INSULIN: ICD-10-CM

## 2024-05-21 DIAGNOSIS — E13.9 LADA (LATENT AUTOIMMUNE DIABETES OF ADULTHOOD): ICD-10-CM

## 2024-05-21 PROCEDURE — 1159F MED LIST DOCD IN RCRD: CPT | Mod: CPTII,S$GLB,, | Performed by: STUDENT IN AN ORGANIZED HEALTH CARE EDUCATION/TRAINING PROGRAM

## 2024-05-21 PROCEDURE — 99999 PR PBB SHADOW E&M-EST. PATIENT-LVL II: CPT | Mod: PBBFAC,,, | Performed by: DIETITIAN, REGISTERED

## 2024-05-21 PROCEDURE — 3074F SYST BP LT 130 MM HG: CPT | Mod: CPTII,S$GLB,, | Performed by: STUDENT IN AN ORGANIZED HEALTH CARE EDUCATION/TRAINING PROGRAM

## 2024-05-21 PROCEDURE — 99214 OFFICE O/P EST MOD 30 MIN: CPT | Mod: 25,S$GLB,, | Performed by: STUDENT IN AN ORGANIZED HEALTH CARE EDUCATION/TRAINING PROGRAM

## 2024-05-21 PROCEDURE — 3078F DIAST BP <80 MM HG: CPT | Mod: CPTII,S$GLB,, | Performed by: STUDENT IN AN ORGANIZED HEALTH CARE EDUCATION/TRAINING PROGRAM

## 2024-05-21 PROCEDURE — 3008F BODY MASS INDEX DOCD: CPT | Mod: CPTII,S$GLB,, | Performed by: STUDENT IN AN ORGANIZED HEALTH CARE EDUCATION/TRAINING PROGRAM

## 2024-05-21 PROCEDURE — 99999 PR PBB SHADOW E&M-EST. PATIENT-LVL IV: CPT | Mod: PBBFAC,,, | Performed by: STUDENT IN AN ORGANIZED HEALTH CARE EDUCATION/TRAINING PROGRAM

## 2024-05-21 PROCEDURE — G0108 DIAB MANAGE TRN  PER INDIV: HCPCS | Mod: S$GLB,,, | Performed by: DIETITIAN, REGISTERED

## 2024-05-21 PROCEDURE — 10060 I&D ABSCESS SIMPLE/SINGLE: CPT | Mod: S$GLB,,, | Performed by: STUDENT IN AN ORGANIZED HEALTH CARE EDUCATION/TRAINING PROGRAM

## 2024-05-21 PROCEDURE — 4010F ACE/ARB THERAPY RXD/TAKEN: CPT | Mod: CPTII,S$GLB,, | Performed by: STUDENT IN AN ORGANIZED HEALTH CARE EDUCATION/TRAINING PROGRAM

## 2024-05-21 PROCEDURE — 3046F HEMOGLOBIN A1C LEVEL >9.0%: CPT | Mod: CPTII,S$GLB,, | Performed by: STUDENT IN AN ORGANIZED HEALTH CARE EDUCATION/TRAINING PROGRAM

## 2024-05-21 PROCEDURE — 1160F RVW MEDS BY RX/DR IN RCRD: CPT | Mod: CPTII,S$GLB,, | Performed by: STUDENT IN AN ORGANIZED HEALTH CARE EDUCATION/TRAINING PROGRAM

## 2024-05-21 RX ORDER — LIDOCAINE HYDROCHLORIDE AND EPINEPHRINE 10; 10 MG/ML; UG/ML
3 INJECTION, SOLUTION INFILTRATION; PERINEURAL
Status: SHIPPED | OUTPATIENT
Start: 2024-05-21

## 2024-05-21 NOTE — PROGRESS NOTES
"Subjective:       Patient ID: Alberto Song is a 48 y.o. male.    Chief Complaint: No chief complaint on file.    HPI:  48 y.o. male presents to [facility] with complaints of gluteal cyst    Patient recently seen in ED 5/14/2024 for gluteal pain and swelling and was diagnosed with cellulitis in ED.    Today, patient reports some time back noticed jelly-bean sized lesion to left gluteal cleft. Lesion grew in size. Seen in ED and wasn't drained and lanced. Was started on antibiotics which has decreased the size of the lesion.    Pain?: Improved  Drainage?: No  Swelling?: Yes, improving    Review of Systems   Constitutional:  Negative for chills, diaphoresis, fatigue and fever.   Respiratory:  Negative for chest tightness and shortness of breath.    Cardiovascular:  Negative for chest pain and palpitations.   Gastrointestinal:  Negative for abdominal pain, diarrhea, nausea and vomiting.   Skin:  Positive for wound. Negative for rash.       Objective:      Vitals:    05/21/24 1410   BP: 112/66   BP Location: Right arm   Patient Position: Sitting   BP Method: Medium (Manual)   Pulse: 84   Resp: 18   SpO2: (!) 94%   Weight: 91.7 kg (202 lb 0.8 oz)   Height: 6' 4" (1.93 m)     Physical Exam  Vitals reviewed.   Constitutional:       General: He is not in acute distress.     Appearance: Normal appearance. He is not ill-appearing.   HENT:      Head: Normocephalic and atraumatic.   Eyes:      General:         Right eye: No discharge.         Left eye: No discharge.      Conjunctiva/sclera: Conjunctivae normal.   Cardiovascular:      Rate and Rhythm: Normal rate.   Pulmonary:      Effort: Pulmonary effort is normal.   Genitourinary:         Comments: ~1.2 cm fluctuant lesion without erythema left inguinal region as illustrated  Musculoskeletal:         General: No deformity.   Skin:     Coloration: Skin is not jaundiced or pale.      Comments: Left gluteal cleft with mild induration left inferior inguinal cleft near " "ischial region   Neurological:      General: No focal deficit present.      Mental Status: He is alert and oriented to person, place, and time.   Psychiatric:         Mood and Affect: Mood normal.         Behavior: Behavior normal.             Lab Results   Component Value Date     04/25/2024    K 4.3 04/25/2024     04/25/2024    CO2 25 04/25/2024    BUN 14 04/25/2024    CREATININE 1.1 04/25/2024    GLUCOSE 132 (H) 09/04/2022    ANIONGAP 8 04/25/2024     Lab Results   Component Value Date    HGBA1C 9.3 (H) 04/01/2024     No results found for: "BNP", "BNPTRIAGEBLO"    Lab Results   Component Value Date    WBC 6.96 04/01/2024    HGB 14.5 04/01/2024    HCT 43.7 04/01/2024    HCT 25 (L) 11/04/2023     04/01/2024    GRAN 4.0 04/01/2024    GRAN 57.7 04/01/2024     Lab Results   Component Value Date    CHOL 173 06/08/2023    HDL 42 06/08/2023    LDLCALC 117.2 06/08/2023    TRIG 69 06/08/2023          Current Outpatient Medications:     b complex vitamins tablet, Take 1 tablet by mouth once daily., Disp: , Rfl:     blood-glucose sensor (DEXCOM G6 SENSOR) Deisi, 1 sensor every 10 days, Disp: 9 each, Rfl: 3    blood-glucose transmitter (DEXCOM G6 TRANSMITTER) Deisi, 1 transmitter every 3 months, Disp: 1 each, Rfl: 4    empagliflozin (JARDIANCE) 25 mg tablet, Take 1 tablet (25 mg total) by mouth once daily., Disp: 90 tablet, Rfl: 3    insulin detemir U-100, Levemir, (LEVEMIR FLEXPEN) 100 unit/mL (3 mL) InPn pen, Inject 25 Units into the skin every evening. Max TDD of 50 units. Titrate up to FBG <130. (Patient taking differently: Inject 20 Units into the skin once daily. Max TDD of 50 units. Titrate up to FBG <130.), Disp: 15 mL, Rfl: 6    insulin lispro 100 unit/mL injection, To use continuously with Omnipod 5 insulin pump.  Max total daily dose of 100 units, Disp: 30 mL, Rfl: 6    insulin lispro 100 unit/mL pen, INJECT 1-10 UNITS BEFORE MEALS AND AT NIGHT, Disp: 9 mL, Rfl: 3    ketoconazole (NIZORAL) 2 % " cream, , Disp: , Rfl:     lisinopriL (PRINIVIL,ZESTRIL) 20 MG tablet, Take 1 tablet (20 mg total) by mouth once daily., Disp: 90 tablet, Rfl: 3    metFORMIN (GLUCOPHAGE-XR) 500 MG ER 24hr tablet, Take 2 tablets (1,000 mg total) by mouth 2 (two) times daily with meals., Disp: 360 tablet, Rfl: 3    OMNIPOD 5 G6 PODS, GEN 5, Crtg, Inject 1 each into the skin every 48 hours. 9 packs of 5 pods for 45 pods for 3 months, Disp: 9 each, Rfl: 3    sildenafiL (VIAGRA) 50 MG tablet, Take 50 mg by mouth daily as needed., Disp: , Rfl:     sulfamethoxazole-trimethoprim 800-160mg (BACTRIM DS) 800-160 mg Tab, Take 1 tablet by mouth 2 (two) times daily. for 7 days, Disp: 14 tablet, Rfl: 0    glucagon (BAQSIMI) 3 mg/actuation Spry, 3 mg (one actuation) into a single nostril; if no response, may repeat in 15 minutes using a new intranasal device. (Patient not taking: Reported on 5/21/2024), Disp: 2 each, Rfl: 1    Current Facility-Administered Medications:     LIDOcaine-EPINEPHrine 1%-1:100,000 injection 3 mL, 3 mL, Other, 1 time in Clinic/HOD,     Facility-Administered Medications Ordered in Other Visits:     0.9%  NaCl infusion, , Intravenous, Continuous, Frank Macias MD        Assessment:       1. Inguinal abscess           Plan:       Inguinal abscess  -     LIDOcaine-EPINEPHrine 1%-1:100,000 injection 3 mL  -     Incision & Drainage  - Instructed patient to notify clinic if any redness to region in days following  - Present to ED if fever, chills, sweats, or other systemic signs of illness    RTC PRN

## 2024-05-21 NOTE — PROCEDURES
"Incision & Drainage    Date/Time: 5/21/2024 2:20 PM    Performed by: Paolo Catalan MD  Authorized by: Paolo Catalan MD    Time out: Immediately prior to procedure a "time out" was called to verify the correct patient, procedure, equipment, support staff and site/side marked as required.    Consent Done?:  Yes (Verbal)    Type:  Abscess  Body area:  Trunk  Location details:  Abdomen  Anesthesia:  Local infiltration  Local anesthetic: Lidocaine 1% with epinephrine  Scalpel size:  11  Incision type:  Single straight  Complexity:  Simple  Drainage:  Pus and serosanguinous  Drainage amount:  Scant  Wound treatment:  Wound left open  Patient tolerance:  Patient tolerated the procedure well with no immediate complications    "

## 2024-05-24 NOTE — PROGRESS NOTES
Diabetes Care Specialist Follow-up Note  Author: Sara Lomax RD, CDE  Date: 5/24/2024    Program Intake  Reason for Diabetes Program Visit:: Intervention  Type of Intervention:: Individual  Individual: Device Training  Device Training: Insulin Pump Start  Current diabetes risk level:: low (HgbA1c 5.7)  In the last 12 months, have you:: none  Permission to speak with others about care:: no  Continuous Glucose Monitoring  Patient has CGM: Yes  Personal CGM type:: Dexcom G6    Lab Results   Component Value Date    HGBA1C 9.3 (H) 04/01/2024     A1c Pre Diabetes Care Specialist Intervention:  5.7%    Clinical    Patient Health Rating  Compared to other people your age, how would you rate your health?: Good    Problem Review  Reviewed Problem List with Patient: yes  Active comorbidities affecting diabetes self-care.: no  Reviewed health maintenance: yes    Clinical Assessment  Current Diabetes Treatment: Insulin, Insulin pump, Oral Medication, Diet  Have you ever experienced hypoglycemia (low blood sugar)?: no  Have you ever experienced hyperglycemia (high blood sugar)?: no    Medication Information  Medication adherence impacting ability to self-manage diabetes?: No    Labs  Lab Compliance Barriers: No    Nutritional Status  Diet: Regular  Meal Plan 24 Hour Recall: Breakfast, Lunch, Dinner, Snack  Meal Plan 24 Hour Recall - Breakfast: coffee some bread/toast  Meal Plan 24 Hour Recall - Lunch: eating pastas, bread, stuff like that  Meal Plan 24 Hour Recall - Dinner: eating pastas, bread, stuff like that  Meal Plan 24 Hour Recall - Snack: Drinks: ocean spray 5 nader drinks - SF, SF sodas, Water, and Coffee - black  Current nutritional status an area of need that is impacting patient's ability to self-manage diabetes?: No    Physical activity/Exercise:   No change    SMBG: using the dexcom      Additional Social History    Support  Is Support an area impacting ability to self-manage diabetes?: No    Access to [employer] &  Technology  Media or technology needs impacting ability to self-manage diabetes?: No    Cognitive/Behavioral Health  Cognitive or behavioral barriers impacting ability to self-manage diabetes?: No    Culture/Restorationism  Culture or Advent beliefs that may impact ability to access healthcare: No    Communication  Communication needs impacting ability to self-manage diabetes?: No    Health Literacy  Health literacy needs impacting ability to self-manage diabetes?: No      Diabetes Self-Management Skills Assessment     Diabetes Disease Process/Treatment Options  Diabetes Disease Process/Treatment Options: Skills Assessment Completed: No  Assessment indicates:: Adequate understanding  Deferred due to:: Other (comment) (previously completed, there has been no change and patient has adequate understanding)  Area of need?: No    Nutrition/Healthy Eating  Nutrition/Healthy Eating Skills Assessment Completed:: No  Assessment indicates:: Adequate understanding  Deffered due to:: Other (comment) (previously completed, there has been no change and patient has adequate understanding)  Area of need?: No    Physical Activity/Exercise  Physical Activity/Exercise Skills Assessment Completed: : No  Assessment indicates:: Adequate understanding  Deffered due to:: Other (comment) (previously completed, there has been no change and patient has adequate understanding)  Area of need?: No    Medications  Patient is able to describe current diabetes management routine.: yes  Diabetes management routine:: diet, insulin, oral medications  Patient is able to identify current diabetes medications, dosages, and appropriate timing of medications.: yes  Patient understands the purpose of the medications taken for diabetes.: yes  Patient reports problems or concerns with current medication regimen.: no  Medication Skills Assessment Completed:: Yes  Assessment indicates:: Adequate understanding, Other (comment)  Area of need?: Yes    Home Blood  Glucose Monitoring  Patient states that blood sugar is checked at home daily.: yes  Monitoring Method:: personal continuous glucose monitor  Personal CGM type:: Dexcom G6  Patient is able to use personal CGM appropriately.: yes  CGM Report reviewed?: yes  Home Blood Glucose Monitoring Skills Assessment Completed: : Yes  Assessment indicates:: Adequate understanding  Area of need?: No    Acute Complications  Patient is able to identify types of acute complications: Yes  Patient Identified:: Hypoglycemia, Hyperglycemia, Diabetic Ketoacidosis  Patient is able to state the basic meaning of hypoglycemia?: Yes  Able to state the blood sugar range for hypoglycemia?: yes  Patient stated range:: <70  Patient can identify general symptoms of hypoglycemia: yes  Patient identified:: dizziness, shakiness, sweating  Able to state proper treatment of hypoglycemia?: yes  Patient identified:: 1/2 can soda/fruit juice  Patient is able to state the basic meaning of hyperglycemia?: Yes  Able to state the blood sugar range for hyperglycemia?: yes  Patient stated range:: >200  Patient able to state proper treatment of hyperglycemia?: yes  Patient identified:: take medication as recommended, monitor blood sugar  Patient able to verbalize sick day plan?: yes  Patient-stated sick day plan:: seek medical attention for nausea, diarrhea, vomiting, fever with high blood sugar, drink sugar-free/calorie containing clear liquids if unable to take solid food, drink more fluids, check urine for ketones, call provider if blood sugar does not improve, check blood sugar every 2-3 hours  Acute Complications Skills Assessment Completed: : Yes  Assessment indicates:: Adequate understanding  Area of need?: No    Chronic Complications  Patient can identify major chronic complications of diabetes.: yes  Stated chronic complications:: heart disease/heart attack, kidney disease, retinopathy, neuropathy/nerve damage, sexual dysfunction, stroke  Patient can  identify ways to prevent or delay diabetes complications.: yes  Stated ways to prevent complications:: controlling blood sugar  Patient is aware that having diabetes increases risk of heart disease?: Yes  Patient is aware that heart disease is the leading cause of death and disability in people with diabetes?: Yes  Patient able to state risk factors for heart disease?: Yes  Patient stated risk factors for heart disease:: High blood pressure, High cholesterol, Having diabetes  Patient is taking statin?: No  Has your doctor talked to you about Statin use?: No  Do you want more information on Statins?: No  Chronic Complications Skills Assessment Completed: : Yes  Assessment indicates:: Adequate understanding  Area of need?: No    Psychosocial/Coping  Patient can identify ways of coping with chronic disease.: yes  Patient-stated ways of coping with chronic disease:: support from loved ones, counseling/actively seeing behavioral professional  Psychosocial/Coping Skills Assessment Completed: : Yes  Assessment indicates:: Adequate understanding  Area of need?: No      During today's follow-up visit,  the following areas required further assessment and content was provided/reviewed.    Based on today's diabetes care assessment, the following areas of need were identified:          5/21/2024    12:01 AM   Social   Support No   Access to Mass Media/Tech No   Cognitive/Behavioral Health No   Culture/Buddhism No   Communication No   Health Literacy No            5/21/2024    12:01 AM   Clinical   Medication Adherence No   Lab Compliance No   Nutritional Status No            5/21/2024    12:01 AM   Diabetes Self-Management Skills   Diabetes Disease Process/Treatment Options No   Nutrition/Healthy Eating No   Physical Activity/Exercise No   Medication Yes, Insulin Pump Education  OMNI POD 5 INSULIN PUMP START  Explained Smart[employer] Dance Technology - Every 5 minutes, the system automatically increases, decreases, or pauses insulin  delivery based on your customized target glucose--helping to protect against highs and lows, day and night.  Automated Mode vs Manual Mode vs Limited Automated Mode  Downloading the Abhi and ProConnect ([facility])  Pod and Dexcom need to be in line of site of each other  Inputting Dexcom Transmitter Code into Abhi or Handheld Device  Dexcom communicates with the pod directly and the Dexcom G6 abhi  Activity Feature  Changing the target and the length of time insulin is on board will adjust automated mode  Changing ICR/Basal Rates/ISF will only change setting in manual mode  SmartBolus Calculator - incorporates CGM data and trend data  Setup podder central account and linked [employer] account  Patient educated on insulin pump therapy, what a basal rate and bolus dose are, when to change pod, demonstrated how to prepare the syringe and pod for use with the pump, discussed ease of usage, basal and bolus, carbohydrate to insulin ratio, correction factors, approved areas to insert set, carbohydrate counting, error messages, explained the basal rates - patient will receive a little bit of insulin throughout 24 hours, bolus dose are delivered delivered by the inputting grams of carbohydrates, explained that patient will be counting carbohydrates and checking blood glucose before each meal, discussed when to change the pod, demonstrated how to fill the pod with insulin, how to apply pod and insert the needle, explained and demonstrated how to safely retract the needle and remove the pod, discussed ease of usage, carbohydrate counting, demonstrated applying device, inserting needle, administering bolus insulin, retracting needle, and removing/disposing of pod. Patient states understanding and performed return demonstration. Patient started first pod in office. Patient provided with written literature and CDE contact information      Pump training was provided per Omni Pod protocol.  Patient is new to insulin pump therapy.       All settings obtained from Shakira Tan's last office visit note.   Basal:  12AM: 1 units/hr     Max basal rate: 5 u/hr     Bolus:  CHO ratio: 1:10  ISF: 1:45  Glucose target : 120 mg/dL  Correct  over: 120mg/dl  Active insulin time: 3.5 hours  Max bolus: 30 units     Low reservoir insulin alarm: 10 units  Change pod alarm: every 3 days      Details of pump therapy were covered included following controller features and programming, pod activation, pod site selection and rotation, automatic pod priming and insertion, setting & editing basal rates in manual mode, giving bolus and other features in the set-up menu.  The following regarding the Omnipod 5 was covered:  During your first Pod wear, since no recent history is available, the Omnipod 5 System uses only your active Basal Program from Manual Mode as a starting point to adjust your insulin. After 48 hours of history is collected, which usually happens with the first Pod wear, [employer] technology stops adjusting insulin against your active Basal Program and starts using the Adaptive Basal Rate for your automated insulin delivery with your next Pod change. With each Pod change, insulin delivery information is sent and saved in the Omnipod 5 Abhi so that the next Pod that is started is updated with the new Adaptive Basal Rate. With each new Pod activation, the system adapts insulin delivery based on physiological needs and total daily insulin (TDI) delivered. After 2-3 Pod changes, adaptation generally stabilizes and automated insulin delivery is based on this adaptation.  Patient demonstrated ability to program controller, activate and insert pod using aseptic technique.  Patient demonstrated ability to program Dexcom transmitter into controller and start automated limited mode.    Instructed pt on use of basic pump features ie...give a bolus, pause insulin, switch from manual to automated mode.  Reviewed features available in manual mode verses automated  mode.   Reviewed when and how to use activity function in automated mode.  Reviewed site selection of pods, rotation of sites and hard stop to change pod every 72 hrs.   Instructed that insulin vial is good out of refrigeration for up to 28-30 days.   Reviewed treatment of hypoglycemia, hyperglycemia; sick day care, DKA, and troubleshooting of pump.  Advised to carry back-up supplies with her and discussed steps to take in case of connection loss.   Omni Pod 24-hour support line provided.       Home Blood Glucose Monitoring No   Acute Complications No   Chronic Complications No   Psychosocial/Coping No        Today's interventions were provided through individual discussion, instruction, and written materials were provided.    Patient verbalized understanding of instruction and written materials.  Pt was able to return back demonstration of instructions today. Patient understood key points, needs reinforcement and further instruction.     Diabetes Self-Management Care Plan Review and Evaluation of Progress:    During today's follow-up Hanss Diabetes Self-Management Care Plan progress was reviewed and progress was evaluated including his/her input. Alberto has agreed to continue his/her journey to improve/maintain overall diabetes control by continuing to set health goals. See care plan progress below.      Care Plan: Diabetes Management   Updates made since 4/24/2024 12:00 AM        Problem: Healthy Eating         Goal: Eat 3 meals daily with 45-60g/3-4 servings of Carbohydrate per meal.    Start Date: 2/12/2024   Expected End Date: 8/19/2024   This Visit's Progress: No change   Recent Progress: Deferred   Priority: High   Barriers: No Barriers Identified          Follow Up Plan     Follow up in about 4 weeks (around 6/18/2024) for Insulin Pump Upload, Personal CGM Upload.    Today's care plan and follow up schedule was discussed with patient.  Alberto verbalized understanding of the care plan, goals, and agrees to  follow up plan.        The patient was encouraged to communicate with his/her health care provider/physician and care team regarding his/her condition(s) and treatment.  I provided the patient with my contact information today and encouraged to contact me via phone or [facility] Patient Portal as needed.     Length of Visit   Total Time: 70 Minutes

## 2024-05-27 ENCOUNTER — PATIENT MESSAGE (OUTPATIENT)
Dept: DIABETES | Facility: CLINIC | Age: 49
End: 2024-05-27
Payer: COMMERCIAL

## 2024-05-27 ENCOUNTER — TELEPHONE (OUTPATIENT)
Dept: DIABETES | Facility: CLINIC | Age: 49
End: 2024-05-27
Payer: COMMERCIAL

## 2024-05-27 NOTE — TELEPHONE ENCOUNTER
Please let patient know that I reviewed his Dexcom download from Sara.  I understand he was starting the Omnipod 5 with her last week.  Keep the follow-up visit that is scheduled tomorrow so we can see if his readings have improved with the Omnipod 5.  His blood sugars were above goal on his Dexcom download that she gave me

## 2024-05-28 ENCOUNTER — PATIENT MESSAGE (OUTPATIENT)
Dept: GASTROENTEROLOGY | Facility: CLINIC | Age: 49
End: 2024-05-28
Payer: COMMERCIAL

## 2024-05-28 ENCOUNTER — CLINICAL SUPPORT (OUTPATIENT)
Dept: DIABETES | Facility: CLINIC | Age: 49
End: 2024-05-28
Payer: COMMERCIAL

## 2024-05-28 DIAGNOSIS — E13.9 LADA (LATENT AUTOIMMUNE DIABETES OF ADULTHOOD): ICD-10-CM

## 2024-05-28 DIAGNOSIS — E11.65 TYPE 2 DIABETES MELLITUS WITH HYPERGLYCEMIA, WITH LONG-TERM CURRENT USE OF INSULIN: Primary | ICD-10-CM

## 2024-05-28 DIAGNOSIS — Z79.4 TYPE 2 DIABETES MELLITUS WITH HYPERGLYCEMIA, WITH LONG-TERM CURRENT USE OF INSULIN: Primary | ICD-10-CM

## 2024-05-28 DIAGNOSIS — E11.9 TYPE 2 DIABETES MELLITUS WITHOUT COMPLICATION, WITHOUT LONG-TERM CURRENT USE OF INSULIN: ICD-10-CM

## 2024-05-28 PROCEDURE — 99999 PR PBB SHADOW E&M-EST. PATIENT-LVL I: CPT | Mod: PBBFAC,,, | Performed by: DIETITIAN, REGISTERED

## 2024-05-28 PROCEDURE — G0108 DIAB MANAGE TRN  PER INDIV: HCPCS | Mod: S$GLB,,, | Performed by: DIETITIAN, REGISTERED

## 2024-05-28 NOTE — PROGRESS NOTES
Diabetes Care Specialist Follow-up Note  Author: Sara Lomax RD, CDE  Date: 5/28/2024    Program Intake  Reason for Diabetes Program Visit:: Intervention  Type of Intervention:: Individual  Individual: Education  Education: Self-Management Skill Review, Pattern Management  Current diabetes risk level:: low (HgbA1c 5.7)  In the last 12 months, have you:: none  Permission to speak with others about care:: no  Continuous Glucose Monitoring  Patient has CGM: Yes  Personal CGM type:: Dexcom G6  GMI Date: 05/28/24  GMI Value: 7.6 %    Lab Results   Component Value Date    HGBA1C 9.3 (H) 04/01/2024     A1c Pre Diabetes Care Specialist Intervention:  5.7%    Clinical     Patient Health Rating  Compared to other people your age, how would you rate your health?: Good    Problem Review  Reviewed Problem List with Patient: yes  Active comorbidities affecting diabetes self-care.: no  Reviewed health maintenance: yes    Clinical Assessment  Current Diabetes Treatment: Oral Medication, Insulin, Insulin pump, Diet  Have you ever experienced hypoglycemia (low blood sugar)?: no  Have you ever experienced hyperglycemia (high blood sugar)?: no    Medication Information  Medication adherence impacting ability to self-manage diabetes?: No    Labs  Lab Compliance Barriers: No    Nutritional Status  Diet: Regular  Meal Plan 24 Hour Recall: Breakfast, Lunch, Dinner, Snack  Meal Plan 24 Hour Recall - Breakfast: coffee some bread/toast  Meal Plan 24 Hour Recall - Lunch: eating pastas, bread, stuff like that  Meal Plan 24 Hour Recall - Dinner: eating pastas, bread, stuff like that  Meal Plan 24 Hour Recall - Snack: Drinks: ocean spray 5 nader drinks - SF, SF sodas, Water, and Coffee - black  Current nutritional status an area of need that is impacting patient's ability to self-manage diabetes?: No    Physical activity/Exercise:   Patient is still active, getting ready to go to [place] for several months for work    SMBG: using the Dexcom and  the Omnipod 5, reports downloaded and reviewed with patient and provider                  Additional Social History    Support  Is Support an area impacting ability to self-manage diabetes?: No    Access to Mass Media & Technology  Media or technology needs impacting ability to self-manage diabetes?: No    Cognitive/Behavioral Health  Cognitive or behavioral barriers impacting ability to self-manage diabetes?: No    Culture/Yazdanism  Culture or Mu-ism beliefs that may impact ability to access healthcare: No    Communication  Communication needs impacting ability to self-manage diabetes?: No    Health Literacy  Health literacy needs impacting ability to self-manage diabetes?: No      Diabetes Self-Management Skills Assessment     Diabetes Disease Process/Treatment Options  Diabetes Disease Process/Treatment Options: Skills Assessment Completed: No  Assessment indicates:: Adequate understanding  Deferred due to:: Other (comment) (previously completed, there has been no change and patient has adequate understanding)  Area of need?: No    Nutrition/Healthy Eating  Nutrition/Healthy Eating Skills Assessment Completed:: No  Assessment indicates:: Adequate understanding  Deffered due to:: Other (comment) (previously completed, there has been no change and patient has adequate understanding)  Area of need?: No    Physical Activity/Exercise  Physical Activity/Exercise Skills Assessment Completed: : No  Assessment indicates:: Adequate understanding  Deffered due to:: Other (comment) (previously completed, there has been no change and patient has adequate understanding)  Area of need?: No    Medications  Patient is able to describe current diabetes management routine.: yes  Diabetes management routine:: diet, insulin, oral medications, insulin pump  Patient is able to identify current diabetes medications, dosages, and appropriate timing of medications.: yes  Patient understands the purpose of the medications taken for  diabetes.: yes  Patient reports problems or concerns with current medication regimen.: no  Medication Skills Assessment Completed:: Yes  Assessment indicates:: Adequate understanding, Other (comment)  Area of need?: Yes    Home Blood Glucose Monitoring  Personal CGM type:: Dexcom G6  Home Blood Glucose Monitoring Skills Assessment Completed: : No  Assessment indicates:: Adequate understanding  Deferred due to:: Other (comment) (previously completed, there has been no change and patient has adequate understanding)  Area of need?: No    Acute Complications  Acute Complications Skills Assessment Completed: : No  Assessment indicates:: Adequate understanding  Deffered due to:: Other (comment) (previously completed, there has been no change and patient has adequate understanding)  Area of need?: No    Chronic Complications  Do you want more information on Statins?: No  Assessment indicates:: Adequate understanding  Deferred due to:: Other (comment) (previously completed, there has been no change and patient has adequate understanding)  Area of need?: No    Psychosocial/Coping  Psychosocial/Coping Skills Assessment Completed: : No  Assessment indicates:: Adequate understanding  Deffered due to:: Other (comment) (previously completed, there has been no change and patient has adequate understanding)  Area of need?: No      During today's follow-up visit,  the following areas required further assessment and content was provided/reviewed.    Based on today's diabetes care assessment, the following areas of need were identified:          5/28/2024    12:01 AM   Social   Support No   Access to Mass Media/Tech No   Cognitive/Behavioral Health No   Culture/Amish No   Communication No   Health Literacy No            5/28/2024    12:01 AM   Clinical   Medication Adherence No   Lab Compliance No   Nutritional Status No            5/28/2024    12:01 AM   Diabetes Self-Management Skills   Diabetes Disease Process/Treatment Options  No   Nutrition/Healthy Eating No   Physical Activity/Exercise No   Medication Yes, adjusted the target down from 120 mg/dL to 110 mg/dL and IOB from 3.5 hours to 3.0 hours, patient instructed on basal rates while in auto mode and reviewed detailed history with patient      Home Blood Glucose Monitoring No, Comparison of previous CGM data 5/21/2024 to current 5/28/2024    Average Glucose 161 improved from 207  Standard Deviation 37 improved from 53  GMI NA % from NA %    Time in Range  2% of time patient was in Very High Range improved from 18%  25% of time patient was in High Range improved from 50%  73% of time patient was in Range improved from 31%  0% of time patient was in Low Range improved from <1%  0% of time patient was in Very Low Range improved from <1%     Acute Complications No   Chronic Complications No   Psychosocial/Coping No        Today's interventions were provided through individual discussion, instruction, and written materials were provided.    Patient verbalized understanding of instruction and written materials.  Pt was able to return back demonstration of instructions today. Patient understood key points, needs reinforcement and further instruction.     Diabetes Self-Management Care Plan Review and Evaluation of Progress:    During today's follow-up Alberto's Diabetes Self-Management Care Plan progress was reviewed and progress was evaluated including his/her input. Alberto has agreed to continue his/her journey to improve/maintain overall diabetes control by continuing to set health goals. See care plan progress below.      Care Plan: Diabetes Management   Updates made since 4/28/2024 12:00 AM        Problem: Healthy Eating         Goal: Eat 3 meals daily with 45-60g/3-4 servings of Carbohydrate per meal.    Start Date: 2/12/2024   Expected End Date: 8/19/2024   This Visit's Progress: On track   Recent Progress: No change   Priority: High   Barriers: No Barriers Identified        Task: Recommended  replacing beverages containing high sugar content with noncaloric/sugar free options and/or water. Completed 5/28/2024        Task: Review the importance of balancing carbohydrates with each meal using portion control techniques to count servings of carbohydrate and label reading to identify serving size and amount of total carbs per serving. Completed 5/28/2024          Follow Up Plan     Follow up in about 4 weeks (around 6/25/2024) for Personal CGM Upload, Insulin Pump Upload.    Today's care plan and follow up schedule was discussed with patient.  Alberto verbalized understanding of the care plan, goals, and agrees to follow up plan.        The patient was encouraged to communicate with his/her health care provider/physician and care team regarding his/her condition(s) and treatment.  I provided the patient with my contact information today and encouraged to contact me via phone or [facility] Patient Portal as needed.     Length of Visit   Total Time: 40 Minutes

## 2024-05-30 ENCOUNTER — PATIENT MESSAGE (OUTPATIENT)
Dept: DIABETES | Facility: CLINIC | Age: 49
End: 2024-05-30

## 2024-05-30 ENCOUNTER — PATIENT OUTREACH (OUTPATIENT)
Dept: DIABETES | Facility: CLINIC | Age: 49
End: 2024-05-30
Payer: COMMERCIAL

## 2024-05-30 ENCOUNTER — TELEPHONE (OUTPATIENT)
Dept: DIABETES | Facility: CLINIC | Age: 49
End: 2024-05-30

## 2024-05-30 DIAGNOSIS — E13.9 LADA (LATENT AUTOIMMUNE DIABETES OF ADULTHOOD): Primary | ICD-10-CM

## 2024-05-30 PROCEDURE — 95251 CONT GLUC MNTR ANALYSIS I&R: CPT | Mod: S$GLB,,, | Performed by: NURSE PRACTITIONER

## 2024-05-30 NOTE — Clinical Note
Reviewed his Dexcom and Omnipod download from Sara Very proud of him as his readings have greatly improved Agree with the changes that diabetes education made changing his target to 110 and adjusting the active insulin time to 3 hours Continue Omnipod 5 insulin pump as his sugars have greatly improved

## 2024-05-30 NOTE — PROGRESS NOTES
Continuous Glucose Sensor Report of Personal Device    Alberto Song is a 48 y.o.male with LESLYE     Interpretation of data from Dexcom G 6- May 22, 2024 through May 28, 2024    Reason for review: Currently on anti-hyperglycemic therapy and failing to achieve glycemic goals.    Lab Results   Component Value Date    HGBA1C 9.3 (H) 04/01/2024         Current diabetes medications and doses:  omnipod 5 AID   See attached settings     ________________________________________________________________    SUMMARY of KEY FINDINGS:      Average glucose: 160  Above 180 mg/dL: 25%  (Above 250 mg/dL: 2%)  Within  mg/dL: 73%  Below 70 mg/dL: 0%  (Below 54 mg/dL: 0%)      CGM data reviewed and notable for the following trends:  Blood sugar readings have greatly improved since starting the Omnipod insulin pump      Will make the following changes based on review of data:  Agree with the changes that diabetes education made changing his target to 110 and adjusting the active insulin time to 3 hours  Continue Omnipod 5 insulin pump as his sugars have greatly improved      Shakira Tan NP

## 2024-06-17 ENCOUNTER — NURSE TRIAGE (OUTPATIENT)
Dept: ADMINISTRATIVE | Facility: CLINIC | Age: 49
End: 2024-06-17
Payer: COMMERCIAL

## 2024-06-17 NOTE — TELEPHONE ENCOUNTER
Alberto c/o cyst to abdomen approximately 2x1 cm. Hx of cyst. Leaving country in a few days and wants to have cyst looked at and lanced if possible. Advised pt per triage protocol to see a physician in office today. V/u. Appt scheduled for 1600 today at [facility] location.  Reason for Disposition   Boil > 1/2 inch across (> 12 mm; larger than a marble) and center is soft or pus colored    Additional Information   Negative: Widespread rash and bright red, sunburn-like and too weak to stand   Negative: Sounds like a life-threatening emergency to the triager   Negative: Widespread red rash   Negative: Black (necrotic), dark purple, or blisters develop in area of wound   Negative: Patient sounds very sick or weak to the triager   Negative: SEVERE pain (e.g., excruciating)   Negative: Red streak from area of infection   Negative: Fever > 100.4 F (38.0 C)   Negative: Boil > 2 inches across (> 5 cm; larger than a golf ball or ping pong ball)    Protocols used: Boil (Skin Abscess)-A-OH

## 2024-06-20 ENCOUNTER — TELEPHONE (OUTPATIENT)
Dept: DIABETES | Facility: CLINIC | Age: 49
End: 2024-06-20
Payer: COMMERCIAL

## 2024-06-20 NOTE — TELEPHONE ENCOUNTER
----- Message from Rizwana Byrnes sent at 6/20/2024  3:20 PM CDT -----  Regarding: Urgent Prior authorization  Contact: [facility] [phone]  Imtiaz calling from [facility] to request a urgent prior authorization to be completed on OMNIPOD 5 G6 PODS, GEN 5, Crtg. She said she need it so she can ship it before the patient leaves the country on next week. She said the patient is requesting a 90 day supply for all his medications before he leave. Please send authorization to the patients pharmacy...  Fax: [phone]  Cover my meds key: [id number]

## 2024-06-21 ENCOUNTER — TELEPHONE (OUTPATIENT)
Dept: DIABETES | Facility: CLINIC | Age: 49
End: 2024-06-21
Payer: COMMERCIAL

## 2024-06-21 NOTE — TELEPHONE ENCOUNTER
----- Message from Smitha Leigh sent at 6/21/2024  2:38 PM CDT -----  Regarding: URGENT - Please advise ASAP (AUTH needed for 90 day supply)  Contact: Mei   Mei calling in regards to getting this patients Onipod script prior auth done so PT can get his meds prior to going out of country. Shows on line does not require a prior auth - they have tried again to [employer] Insurance is kicking it back stating it needs a prior authorization for a 90 day supply to be authorized. PT is leaving to go out of Country. They will not be able to fill PT script for the 90 day fill without a prior authorization.     Please have provider reach out for the prior auth to call directly to Insurance is  # [phone]     If not please contact back Mei at @# [phone] Mei     Send clinical notes to FAX  [phone]  and Mei will take care of

## 2024-06-25 ENCOUNTER — TELEPHONE (OUTPATIENT)
Dept: DIABETES | Facility: CLINIC | Age: 49
End: 2024-06-25
Payer: COMMERCIAL

## 2024-06-25 NOTE — TELEPHONE ENCOUNTER
Spoke to pt provided pt with pa reference number [id number] and to call his insurance to check on the response of PA, pt verbalized understanding

## 2024-06-25 NOTE — TELEPHONE ENCOUNTER
PA for omnipod initiated pa reference number [id number] , insurance stated we can expect a response back in 24 hours

## 2024-06-25 NOTE — TELEPHONE ENCOUNTER
----- Message from Frank Knapp sent at 6/25/2024 12:56 PM CDT -----  Contact: [phone]  Pt is calling to speak with someone about a prior authorization. Please call back to further assist.

## 2024-06-25 NOTE — TELEPHONE ENCOUNTER
----- Message from Deepti Arreola sent at 6/25/2024 12:45 PM CDT -----  Contact: [phone]  [facility] calling to get a prior authorization for OMNIPOD 5 G6 PODS, GEN 5, Crtg Please advise [phone]

## 2024-06-26 ENCOUNTER — TELEPHONE (OUTPATIENT)
Dept: DIABETES | Facility: CLINIC | Age: 49
End: 2024-06-26
Payer: COMMERCIAL

## 2024-06-27 ENCOUNTER — NUTRITION (OUTPATIENT)
Dept: DIABETES | Facility: CLINIC | Age: 49
End: 2024-06-27
Payer: COMMERCIAL

## 2024-06-27 DIAGNOSIS — E11.65 TYPE 2 DIABETES MELLITUS WITH HYPERGLYCEMIA, WITH LONG-TERM CURRENT USE OF INSULIN: ICD-10-CM

## 2024-06-27 DIAGNOSIS — E11.9 TYPE 2 DIABETES MELLITUS WITHOUT COMPLICATION, WITHOUT LONG-TERM CURRENT USE OF INSULIN: ICD-10-CM

## 2024-06-27 DIAGNOSIS — Z79.4 TYPE 2 DIABETES MELLITUS WITH HYPERGLYCEMIA, WITH LONG-TERM CURRENT USE OF INSULIN: ICD-10-CM

## 2024-06-27 DIAGNOSIS — E13.9 LADA (LATENT AUTOIMMUNE DIABETES OF ADULTHOOD): Primary | ICD-10-CM

## 2024-06-27 PROCEDURE — 99999 PR PBB SHADOW E&M-EST. PATIENT-LVL II: CPT | Mod: PBBFAC,,, | Performed by: DIETITIAN, REGISTERED

## 2024-06-27 PROCEDURE — G0108 DIAB MANAGE TRN  PER INDIV: HCPCS | Mod: S$GLB,,, | Performed by: DIETITIAN, REGISTERED

## 2024-06-28 ENCOUNTER — PATIENT MESSAGE (OUTPATIENT)
Dept: DIABETES | Facility: CLINIC | Age: 49
End: 2024-06-28
Payer: COMMERCIAL

## 2024-07-01 NOTE — PROGRESS NOTES
Diabetes Care Specialist Follow-up Note  Author: Sara Lomax RD, CDE  Date: 7/1/2024    Program Intake  Reason for Diabetes Program Visit:: Intervention  Type of Intervention:: Individual  Individual: Education  Education: Pattern Management  Current diabetes risk level:: low (HgbA1c 5.7)  In the last 12 months, have you:: none  Permission to speak with others about care:: no  Continuous Glucose Monitoring  Patient has CGM: Yes  Personal CGM type:: Dexcom G6  GMI Date: 05/28/24  GMI Value: 7.6 %    Lab Results   Component Value Date    HGBA1C 8.1 (H) 06/27/2024     A1c Pre Diabetes Care Specialist Intervention:  5.7%    Clinical  Patient Health Rating  Compared to other people your age, how would you rate your health?: Good    Problem Review  Reviewed Problem List with Patient: yes  Active comorbidities affecting diabetes self-care.: no  Reviewed health maintenance: yes    Clinical Assessment  Current Diabetes Treatment: Oral Medication, Insulin pump, Insulin, Diet  Have you ever experienced hypoglycemia (low blood sugar)?: no  Have you ever experienced hyperglycemia (high blood sugar)?: no    Medication Information  Medication adherence impacting ability to self-manage diabetes?: No    Labs  Lab Compliance Barriers: No    Nutritional Status  Diet: Regular  Meal Plan 24 Hour Recall: Breakfast, Lunch, Dinner, Snack  Meal Plan 24 Hour Recall - Breakfast: coffee some bread/toast  Meal Plan 24 Hour Recall - Lunch: eating pastas, bread, stuff like that  Meal Plan 24 Hour Recall - Dinner: eating pastas, bread, stuff like that  Meal Plan 24 Hour Recall - Snack: Drinks: ocean spray 5 nader drinks - SF, SF sodas, Water, and Coffee - black  Current nutritional status an area of need that is impacting patient's ability to self-manage diabetes?: No    Physical activity/Exercise:   No change    SMBG: patient is using a dexcom                      Additional Social History    Support  Is Support an area impacting ability to  self-manage diabetes?: No    Access to Mass Media & Technology  Media or technology needs impacting ability to self-manage diabetes?: No    Cognitive/Behavioral Health  Cognitive or behavioral barriers impacting ability to self-manage diabetes?: No    Culture/Christian  Culture or Advent beliefs that may impact ability to access healthcare: No    Communication  Communication needs impacting ability to self-manage diabetes?: No    Health Literacy  Health literacy needs impacting ability to self-manage diabetes?: No      Diabetes Self-Management Skills Assessment     Diabetes Disease Process/Treatment Options  Diabetes Disease Process/Treatment Options: Skills Assessment Completed: No  Assessment indicates:: Adequate understanding  Deferred due to:: Other (comment) (previously completed, there has been no change and patient has adequate understanding)  Area of need?: No    Nutrition/Healthy Eating  Nutrition/Healthy Eating Skills Assessment Completed:: No  Assessment indicates:: Adequate understanding  Deffered due to:: Other (comment) (previously completed, there has been no change and patient has adequate understanding)  Area of need?: No    Physical Activity/Exercise  Physical Activity/Exercise Skills Assessment Completed: : No  Assessment indicates:: Adequate understanding  Deffered due to:: Other (comment) (previously completed, there has been no change and patient has adequate understanding)  Area of need?: No    Medications  Patient is able to describe current diabetes management routine.: yes  Diabetes management routine:: diet, insulin, oral medications, insulin pump  Patient is able to identify current diabetes medications, dosages, and appropriate timing of medications.: yes  Patient understands the purpose of the medications taken for diabetes.: yes  Patient reports problems or concerns with current medication regimen.: no  Medication Skills Assessment Completed:: Yes  Assessment indicates:: Adequate  understanding  Area of need?: No    Home Blood Glucose Monitoring  Personal CGM type:: Dexcom G6  Home Blood Glucose Monitoring Skills Assessment Completed: : No  Assessment indicates:: Adequate understanding  Deferred due to:: Other (comment) (previously completed, there has been no change and patient has adequate understanding)  Area of need?: No    Acute Complications  Acute Complications Skills Assessment Completed: : No  Assessment indicates:: Adequate understanding  Deffered due to:: Other (comment) (previously completed, there has been no change and patient has adequate understanding)  Area of need?: No    Chronic Complications  Chronic Complications Skills Assessment Completed: : No  Assessment indicates:: Adequate understanding  Deferred due to:: Other (comment) (previously completed, there has been no change and patient has adequate understanding)  Area of need?: No    Psychosocial/Coping  Psychosocial/Coping Skills Assessment Completed: : No  Assessment indicates:: Adequate understanding  Deffered due to:: Other (comment) (previously completed, there has been no change and patient has adequate understanding)  Area of need?: No      During today's follow-up visit,  the following areas required further assessment and content was provided/reviewed.    Based on today's diabetes care assessment, the following areas of need were identified:          6/27/2024    12:02 AM   Social   Support No   Access to Mass Media/Tech No   Cognitive/Behavioral Health No   Culture/Zoroastrian No   Communication No   Health Literacy No            6/27/2024    12:02 AM   Clinical   Medication Adherence No   Lab Compliance No   Nutritional Status No            6/27/2024    12:02 AM   Diabetes Self-Management Skills   Diabetes Disease Process/Treatment Options No   Nutrition/Healthy Eating No   Physical Activity/Exercise No   Medication No, no changes made at this time   Home Blood Glucose Monitoring No   Acute Complications No    Chronic Complications No   Psychosocial/Coping No        Today's interventions were provided through individual discussion, instruction, and written materials were provided.    Patient verbalized understanding of instruction and written materials.  Pt was able to return back demonstration of instructions today. Patient understood key points, needs reinforcement and further instruction.     Diabetes Self-Management Care Plan Review and Evaluation of Progress:    During today's follow-up Hanss Diabetes Self-Management Care Plan progress was reviewed and progress was evaluated including his/her input. Alberto has agreed to continue his/her journey to improve/maintain overall diabetes control by continuing to set health goals. See care plan progress below.      Care Plan: Diabetes Management   Updates made since 6/1/2024 12:00 AM        Problem: Healthy Eating         Goal: Eat 3 meals daily with 45-60g/3-4 servings of Carbohydrate per meal.    Start Date: 2/12/2024   Expected End Date: 8/19/2024   This Visit's Progress: On track   Recent Progress: On track   Priority: High   Barriers: No Barriers Identified        Task: Discussed strategies for choosing healthier menu options when dining out. Completed 7/1/2024          Follow Up Plan     Follow up in about 3 months (around 9/27/2024) for Personal CGM Upload, Insulin Pump Upload.    Today's care plan and follow up schedule was discussed with patient.  Alberto verbalized understanding of the care plan, goals, and agrees to follow up plan.        The patient was encouraged to communicate with his/her health care provider/physician and care team regarding his/her condition(s) and treatment.  I provided the patient with my contact information today and encouraged to contact me via phone or [facility] Patient Portal as needed.     Length of Visit   Total Time: 35 Minutes

## 2024-07-08 ENCOUNTER — PATIENT OUTREACH (OUTPATIENT)
Dept: DIABETES | Facility: CLINIC | Age: 49
End: 2024-07-08
Payer: COMMERCIAL

## 2024-07-08 ENCOUNTER — PATIENT MESSAGE (OUTPATIENT)
Dept: DIABETES | Facility: CLINIC | Age: 49
End: 2024-07-08

## 2024-07-08 DIAGNOSIS — E13.9 LADA (LATENT AUTOIMMUNE DIABETES OF ADULTHOOD): Primary | ICD-10-CM

## 2024-07-08 DIAGNOSIS — Z96.41 INSULIN PUMP IN PLACE: ICD-10-CM

## 2024-07-08 DIAGNOSIS — Z87.19 HISTORY OF PANCREATITIS: ICD-10-CM

## 2024-07-08 PROCEDURE — 95251 CONT GLUC MNTR ANALYSIS I&R: CPT | Mod: S$GLB,,, | Performed by: NURSE PRACTITIONER

## 2024-07-08 NOTE — PROGRESS NOTES
Continuous Glucose Sensor Report of Personal Device    Alberto Song is a 48 y.o.male with LESLYE     Interpretation of data from Dexcom G6---reviewed from June 14, 2024 through June 27, 2024    Reason for review: Currently on anti-hyperglycemic therapy and failing to achieve glycemic goals.    Lab Results   Component Value Date    HGBA1C 8.1 (H) 06/27/2024         Current diabetes medications and doses:   Omnipod 5   Start omnipod 5  Omnipod 5 with  Humalog/lispro   Basal: 1 unit/hr   ICR: 1:10- set doses with meals -3/3/5--30/30/50 grams of carbs   ISF: 1:45  Target 120  IOB 3.5 hours        ________________________________________________________________    SUMMARY of KEY FINDINGS:      Average glucose: 153  Above 180 mg/dL:  21%  (Above 250 mg/dL:  3%)  Within  mg/dL:  75%  Below 70 mg/dL:  Less than 1%  (Below 54 mg/dL:  Less than 1%)      CGM data reviewed and notable for the following trends:  Overall blood sugar readings well controlled  Some prandial excursions at dinner time      Will make the following changes based on review of data:  Make sure that he is bolusing for all carbohydrate intake  Blood sugar readings are improving      Shakira Tan NP

## 2024-07-08 NOTE — Clinical Note
Please let patient know I reviewed his download from Sara in his readings are improving.  Keep up the good work It looks like he is running high after dinner.  Please make sure that he is bolusing for all carbohydrate intake If he is then he may need to increase by 1 unit so instead of 4 units give 5 instead of 5 units give 6

## 2024-08-15 ENCOUNTER — OFFICE VISIT (OUTPATIENT)
Dept: GASTROENTEROLOGY | Facility: CLINIC | Age: 49
End: 2024-08-15
Payer: COMMERCIAL

## 2024-08-15 DIAGNOSIS — K86.1 CHRONIC PANCREATITIS, UNSPECIFIED PANCREATITIS TYPE: Primary | ICD-10-CM

## 2024-08-15 PROCEDURE — 3052F HG A1C>EQUAL 8.0%<EQUAL 9.0%: CPT | Mod: CPTII,95,, | Performed by: INTERNAL MEDICINE

## 2024-08-15 PROCEDURE — 3066F NEPHROPATHY DOC TX: CPT | Mod: CPTII,95,, | Performed by: INTERNAL MEDICINE

## 2024-08-15 PROCEDURE — 99214 OFFICE O/P EST MOD 30 MIN: CPT | Mod: 95,,, | Performed by: INTERNAL MEDICINE

## 2024-08-15 PROCEDURE — 3061F NEG MICROALBUMINURIA REV: CPT | Mod: CPTII,95,, | Performed by: INTERNAL MEDICINE

## 2024-08-15 PROCEDURE — 4010F ACE/ARB THERAPY RXD/TAKEN: CPT | Mod: CPTII,95,, | Performed by: INTERNAL MEDICINE

## 2024-08-15 NOTE — PROGRESS NOTES
The patient location is: residence  The chief complaint leading to consultation is: chronic pancreatitis    Visit type: audiovisual    Face to Face time with patient: 20min  30 minutes of total time spent on the encounter, which includes face to face time and non-face to face time preparing to see the patient (eg, review of tests), Obtaining and/or reviewing separately obtained history, Documenting clinical information in the electronic or other health record, Independently interpreting results (not separately reported) and communicating results to the patient/family/caregiver, or Care coordination (not separately reported).     Each patient to whom he or she provides medical services by telemedicine is:  (1) informed of the relationship between the physician and patient and the respective role of any other health care provider with respect to management of the patient; and (2) notified that he or she may decline to receive medical services by telemedicine and may withdraw from such care at any time.        Advanced Endoscopy / Pancreaticobiliary Service    Reason for visit (Chief Complaint): Abnormal EUS/imaging with a pancreas tail lesion and findings of chronic pancreatitis on EUS (abstaining from alcohol and working on cutting back with cigarette smoking currently 3/4 pack per day)    Referring provider/PCP: No referring provider defined for this encounter.    History of Present Illness: Patient presents for follow-up virtually to discuss past abnormal imaging of the pancreas with an EUS with biopsy of the pancreas tail lesion resulting with fibrosis seen with benign cellularity back in January 2024.  Patient states he has otherwise been doing very well and has no significant complaints.  He did have findings of chronic pancreatitis on EUS in the setting of prior alcohol use/moderate/heavy abuse and ongoing tobacco abuse.  He has cut back to about 3/4 pack of cigarette smoking, and we counseled on reducing this  further.    Discuss whether we ought to surveilled this or not, I think that is worthwhile pursuing further imaging with a CT pancreas protocol relatively soon, patient has travel plans and will return back in January 2025, so will plan to have patient get a CT pancreas protocol in January 2025 as soon as he returns from out of country.  Pending that result, we can consider further EUS with 1 more sample if there is significant concern in that tail region of the pancreas.    Otherwise denies any high-risk features outside of prior alcohol use/abuse and ongoing tobacco abuse.       Past Medical History:   Diagnosis Date    Asthma     Diabetes mellitus     Hyperlipidemia     Hypertension     Sleep apnea, unspecified     Type 2 diabetes mellitus without complication, without long-term current use of insulin 6/6/2023       Past Surgical History:   Procedure Laterality Date    CHOLECYSTECTOMY      COLONOSCOPY N/A 02/14/2024    Procedure: COLONOSCOPY;  Surgeon: Kal Swan MD;  Location: [facility];  Service: Endoscopy;  Laterality: N/A;    ENDOSCOPIC ULTRASOUND OF UPPER GASTROINTESTINAL TRACT N/A 01/23/2024    Procedure: ULTRASOUND, UPPER GI TRACT, ENDOSCOPIC;  Surgeon: Angie Cobb MD;  [facility]  Service: Endoscopy;  Laterality: N/A;  1/2/23: instructions sent via portal-GD  1/18-lvm for precall-MS  1/18/24-Precall complete-DS    LAPAROSCOPIC CHOLECYSTECTOMY N/A 02/08/2023    Procedure: CHOLECYSTECTOMY, LAPAROSCOPIC;  Surgeon: Nilo Tobias MD;  Location[facility];  Service: General;  Laterality: N/A;       Family History   Problem Relation Name Age of Onset    Cancer Mother Emy Ladivalerykenji     Early death Mother      Hypertension Father Shane Ramírezliban        Social History     Socioeconomic History    Marital status: Single   Tobacco Use    Smoking status: Every Day     Current packs/day: 1.00     Average packs/day: 1 pack/day for 32.0 years (32.0 ttl pk-yrs)     Types:  Cigarettes    Smokeless tobacco: Never   Substance and Sexual Activity    Alcohol use: Not Currently     Comment: One 750ml per day    Drug use: Never    Sexual activity: Not Currently     Partners: Female     Birth control/protection: None   Social History Narrative    ** Merged History Encounter **          Social Determinants of Health     Financial Resource Strain: Low Risk  (8/15/2024)    Overall Financial Resource Strain (CARDIA)     Difficulty of Paying Living Expenses: Not hard at all   Food Insecurity: No Food Insecurity (8/15/2024)    Hunger Vital Sign     Worried About Running Out of Food in the Last Year: Never true     Ran Out of Food in the Last Year: Never true   Transportation Needs: Patient Declined (11/6/2023)    PRAPARE - Transportation     Lack of Transportation (Medical): Patient declined     Lack of Transportation (Non-Medical): Patient declined   Physical Activity: Inactive (8/15/2024)    Exercise Vital Sign     Days of Exercise per Week: 0 days     Minutes of Exercise per Session: 0 min   Stress: No Stress Concern Present (8/15/2024)    [employer] [place] [employer] - Occupational Stress Questionnaire     Feeling of Stress : Not at all   Housing Stability: Unknown (8/15/2024)    Housing Stability Vital Sign     Unable to Pay for Housing in the Last Year: No       ROS otherwise unremarkable outside of the aforementioned symptoms in HPI.    There were no vitals filed for this visit.    No physical exam was performed as per the virtual nature of this visit.    Laboratory:   Reviewed in chart/records and relevant findings are summarized above in HPI.    Imaging:  Reviewed in chart/records and relevant findings are summarized above in HPI.      Assessment/Plan:      # Image evidence of at least moderate chronic pancreatitis  # Abnormal EUS/imaging with a pancreas tail lesion about 2.75 cm with benign biopsy findings and findings of chronic pancreatitis on EUS (abstaining from alcohol  and working on cutting back with cigarette smoking currently 3/4 pack per day)    Plan:  - Discussed prior image findings and reviewed during visit, findings with biopsy results point towards chronic pancreatitis type changes with fibrosis and otherwise benign-appearing epithelial cells seen.    - Given the lesional appearance on imaging of the pancreas tail finding, as well as in the setting of likely moderate chronic pancreatitis changes due to alcohol/tobacco history, we will continue to watch this region with an updated CT pancreas protocol in the next several months once the patient returns from international travel in January 2025  - continue to abstain from alcohol, which he has done for about 8 months now, congratulated him on this.  - work towards achieving smoking cessation and sustaining this in the setting of chronic pancreatitis type findings.    Virtual follow-up with me after next CT pancreas protocol was done sometime in January or February 2025.      Angie Cobb MD  [facility] [place] [facility]

## 2024-08-19 ENCOUNTER — TELEPHONE (OUTPATIENT)
Dept: HEPATOLOGY | Facility: CLINIC | Age: 49
End: 2024-08-19
Payer: COMMERCIAL

## 2024-08-19 NOTE — TELEPHONE ENCOUNTER
----- Message from Debi Cottrell sent at 8/19/2024  1:28 PM CDT -----  Contact: Self/ [phone]  Reason for the appointment:  follow up     Patient preference of timeframe to be scheduled:  same date just virtual     Would the patient like a call back, or a response through their [facility] portal?:   call back     Comments:  pt stated that he would like to change his appt that is scheduled for tomorrow 8/20 @ 2:30  to a virtual visit . Please advise

## 2024-08-20 ENCOUNTER — OFFICE VISIT (OUTPATIENT)
Dept: HEPATOLOGY | Facility: CLINIC | Age: 49
End: 2024-08-20
Payer: COMMERCIAL

## 2024-08-20 DIAGNOSIS — K75.81 STEATOHEPATITIS: ICD-10-CM

## 2024-08-20 DIAGNOSIS — K70.9 ALCOHOLIC LIVER DISEASE: Primary | ICD-10-CM

## 2024-08-20 DIAGNOSIS — K74.00 LIVER FIBROSIS: ICD-10-CM

## 2024-08-20 PROBLEM — Z51.5 PALLIATIVE CARE ENCOUNTER: Status: RESOLVED | Noted: 2023-11-07 | Resolved: 2024-08-20

## 2024-08-20 PROCEDURE — 3052F HG A1C>EQUAL 8.0%<EQUAL 9.0%: CPT | Mod: CPTII,95,, | Performed by: INTERNAL MEDICINE

## 2024-08-20 PROCEDURE — 99213 OFFICE O/P EST LOW 20 MIN: CPT | Mod: 95,,, | Performed by: INTERNAL MEDICINE

## 2024-08-20 PROCEDURE — 3061F NEG MICROALBUMINURIA REV: CPT | Mod: CPTII,95,, | Performed by: INTERNAL MEDICINE

## 2024-08-20 PROCEDURE — 1159F MED LIST DOCD IN RCRD: CPT | Mod: CPTII,95,, | Performed by: INTERNAL MEDICINE

## 2024-08-20 PROCEDURE — 1160F RVW MEDS BY RX/DR IN RCRD: CPT | Mod: CPTII,95,, | Performed by: INTERNAL MEDICINE

## 2024-08-20 PROCEDURE — 3066F NEPHROPATHY DOC TX: CPT | Mod: CPTII,95,, | Performed by: INTERNAL MEDICINE

## 2024-08-20 PROCEDURE — 4010F ACE/ARB THERAPY RXD/TAKEN: CPT | Mod: CPTII,95,, | Performed by: INTERNAL MEDICINE

## 2024-08-20 NOTE — PATIENT INSTRUCTIONS
External order for abdo US- to be done now  Labs every 6 months - due 01/25  Ct scan per AES 01/25  Get vaccinated for HAV  Fibroscan 3/25  Return 01/25

## 2024-08-20 NOTE — Clinical Note
1. External order for abdo US- to be done now 2. Labs every 6 months - due 01/25 Fibroscan 03/25 Return 01/25 vs 02/25

## 2024-08-20 NOTE — PROGRESS NOTES
The patient location is: home  The chief complaint leading to consultation is: f/u alc liver disease    Visit type: audiovisual    Face to Face time with patient: 15 minutes  30 minutes of total time spent on the encounter, which includes face to face time and non-face to face time preparing to see the patient (eg, review of tests), Obtaining and/or reviewing separately obtained history, Documenting clinical information in the electronic or other health record, Independently interpreting results (not separately reported) and communicating results to the patient/family/caregiver, or Care coordination (not separately reported).         Each patient to whom he or she provides medical services by telemedicine is:  (1) informed of the relationship between the physician and patient and the respective role of any other health care provider with respect to management of the patient; and (2) notified that he or she may decline to receive medical services by telemedicine and may withdraw from such care at any time.    Notes: HEPATOLOGY FOLLOW UP    Referring Physician: Paolo Catalan MD   Current Corresponding Physician: Paolo Catalan MD     Alberto Song is here for follow up of Alcohol-induced liver disease      HPI  Alberto Song is a 49 y.o. male who presented 12/4/23 for evaluation of elevated liver tests:     Labs 11/21/23: ALT 70, AST 38, ALKP 145, Tbil 1.1  PRISCILA-, ASMA-, AMA-, IgG not elevated, viral hep serologies not done     [facility]do US w doppler 11/5/23: Hepatomegaly with hepatic steatosis.      --stopped drinking 11/4 or 11/5/23  --drank heavily ~2-3 years  -PAWEL detox 10/22-stopped x 4-6 months after 28 day inpt rehab     --cholecystectomy removed 02/23     The patient denied any symptoms of decompensated cirrhosis, including no ascites or edema, cognitive problems that would suggest hepatic encephalopathy, or GI bleeding from varices.      MELD 3.0: 23 at 11/10/2023  5:35 AM  MELD-Na: 22 at  11/10/2023  5:35 AM  Calculated from:  Serum Creatinine: 0.5 mg/dL (Using min of 1 mg/dL) at 11/10/2023  5:35 AM  Serum Sodium: 131 mmol/L at 11/10/2023  5:35 AM  Total Bilirubin: 13.1 mg/dL at 11/10/2023  5:35 AM  Serum Albumin: 2.2 g/dL at 11/10/2023  5:35 AM  INR(ratio): 1.2 at 11/10/2023  5:35 AM  Age at listing (hypothetical): 48 years  Sex: Male at 11/10/2023  5:35 AM    Interval History  Since Alberto Arnold Duncan's last 2 visits:  --alcohol: none  --BS now very well controlled x 6 months    Serologic w/u: HCV Ab-, HBsAg-, HBcAb-, HBsAb+, HAV IgG-, PRISCILA-, ASMA-, AMA-, IgG low (not elevated), alpha 1 antitrypsin normal, iron sat 49%, ferritin 5865, Peth <10 (12/23/23)    Labs 6/27/24: ALT 13, AST 14, ALKP 104, Tbil 0.3, plts 195, peth <10, plts 195  Fibroscan 3/22/24: S1 (11-37% steatosis); F3 fibrosis  Hemochormoatosis gene test 3/24: neg for C282 and H63 mutations    MRI abdo w wo contrast 12/27/23: Hepatomegaly and hepatic steatosis.  No suspicious focal hepatic lesion. New mass along the tail of the pancreas.  In this patient with findings of acute pancreatitis on CT 01/05/2023, this could represent pseudocyst or splenic artery aneurysm.  Pancreatic mass (3.3 cm) is considered less likely but difficult to exclude.  Further evaluation with pancreas protocol CT is recommended. Moderate splenomegaly.  CT abdo w wo contrast 12/29/23: Mild inflammatory change surrounding the pancreatic head and uncinate process, with redemonstration of the well-circumscribed lesion about the pancreatic tail measuring approximately 2.7 x 3.4 cm.  No definite appreciable enhancement or obvious connection to the adjacent splenic artery.  Findings remain nonspecific, with considerations including a pancreatic pseudocyst or walled-off collection.  Thrombosed splenic artery aneurysm and cystic pancreatic mass lesion felt unlikely, though not excluded.  Consider short-term interval follow-up imaging to ensure stability of this finding.  Additional 2.9 cm focus about the lesser curvature of the stomach, without definite appreciable enhancement.  Findings may represent an additional walled-off fluid collection versus an enlarged lymph node.  Attention on follow-up imaging is recommended. Hepatosplenomegaly with hepatic steatosis.  Additional findings concerning for portal hypertension including multiple collateral vessels near the splenic hilum and gastroesophageal margins, as well as recanalization of the umbilical vein.  EUS 1/23/24: chronic pancreatitis, severe; 2.9 cm x 2.1 cm pancreatic tail lesion- s/p FNA-stromal fibrosis but no cancer; fatty liver    MELD 3.0: 8 at 6/27/2024  8:57 AM  MELD-Na: 7 at 6/27/2024  8:57 AM  Calculated from:  Serum Creatinine: 1.1 mg/dL at 6/27/2024  8:57 AM  Serum Sodium: 136 mmol/L at 6/27/2024  8:57 AM  Total Bilirubin: 0.3 mg/dL (Using min of 1 mg/dL) at 6/27/2024  8:57 AM  Serum Albumin: 3.9 g/dL (Using max of 3.5 g/dL) at 6/27/2024  8:57 AM  INR(ratio): 1.0 at 6/27/2024  8:57 AM  Age at listing (hypothetical): 48 years  Sex: Male at 6/27/2024  8:57 AM       Outpatient Encounter Medications as of 8/20/2024   Medication Sig Dispense Refill    atorvastatin (LIPITOR) 10 MG tablet Take 1 tablet (10 mg total) by mouth once daily. 90 tablet 2    b complex vitamins tablet Take 1 tablet by mouth once daily.      blood-glucose sensor (DEXCOM G6 SENSOR) Deisi 1 sensor every 10 days 9 each 3    blood-glucose transmitter (DEXCOM G6 TRANSMITTER) Deisi 1 transmitter every 3 months 1 each 4    empagliflozin (JARDIANCE) 25 mg tablet Take 1 tablet (25 mg total) by mouth once daily. 90 tablet 3    glucagon (BAQSIMI) 3 mg/actuation Spry 3 mg (one actuation) into a single nostril; if no response, may repeat in 15 minutes using a new intranasal device. 2 each 1    insulin detemir U-100, Levemir, (LEVEMIR FLEXPEN) 100 unit/mL (3 mL) InPn pen Inject 25 Units into the skin every evening. Max TDD of 50 units. Titrate up to FBG <130.  (Patient taking differently: Inject 20 Units into the skin once daily. Max TDD of 50 units. Titrate up to FBG <130.) 15 mL 6    insulin lispro 100 unit/mL injection To use continuously with Omnipod 5 insulin pump.  Max total daily dose of 100 units 30 mL 6    insulin lispro 100 unit/mL pen INJECT 1-10 UNITS BEFORE MEALS AND AT NIGHT 9 mL 3    ketoconazole (NIZORAL) 2 % cream       lisinopriL (PRINIVIL,ZESTRIL) 20 MG tablet Take 1 tablet (20 mg total) by mouth once daily. 90 tablet 3    metFORMIN (GLUCOPHAGE-XR) 500 MG ER 24hr tablet Take 2 tablets (1,000 mg total) by mouth 2 (two) times daily with meals. 360 tablet 3    OMNIPOD 5 G6 PODS, GEN 5, Crtg Inject 1 each into the skin every 48 hours. 9 packs of 5 pods for 45 pods for 3 months 9 each 3    sildenafiL (VIAGRA) 50 MG tablet Take 50 mg by mouth daily as needed.       Facility-Administered Encounter Medications as of 8/20/2024   Medication Dose Route Frequency Provider Last Rate Last Admin    0.9%  NaCl infusion   Intravenous Continuous Frank Macias MD        LIDOcaine-EPINEPHrine 1%-1:100,000 injection 3 mL  3 mL Other 1 time in Clinic/HOD          Review of patient's allergies indicates:  No Known Allergies  Past Medical History:   Diagnosis Date    Asthma     Diabetes mellitus     Hyperlipidemia     Hypertension     Sleep apnea, unspecified     Type 2 diabetes mellitus without complication, without long-term current use of insulin 6/6/2023       Review of Systems   Constitutional: Negative.    HENT: Negative.     Eyes: Negative.    Respiratory: Negative.     Cardiovascular: Negative.    Gastrointestinal: Negative.    Genitourinary: Negative.    Musculoskeletal: Negative.    Skin: Negative.    Neurological: Negative.    Psychiatric/Behavioral: Negative.       There were no vitals filed for this visit.      MELD 3.0: 8 at 6/27/2024  8:57 AM  MELD-Na: 7 at 6/27/2024  8:57 AM  Calculated from:  Serum Creatinine: 1.1 mg/dL at 6/27/2024  8:57 AM  Serum  Sodium: 136 mmol/L at 6/27/2024  8:57 AM  Total Bilirubin: 0.3 mg/dL (Using min of 1 mg/dL) at 6/27/2024  8:57 AM  Serum Albumin: 3.9 g/dL (Using max of 3.5 g/dL) at 6/27/2024  8:57 AM  INR(ratio): 1.0 at 6/27/2024  8:57 AM  Age at listing (hypothetical): 48 years  Sex: Male at 6/27/2024  8:57 AM      Lab Results   Component Value Date     (H) 06/27/2024     (H) 06/27/2024    BUN 17 06/27/2024    BUN 17 06/27/2024    CREATININE 1.1 06/27/2024    CREATININE 1.1 06/27/2024    CALCIUM 9.4 06/27/2024    CALCIUM 9.4 06/27/2024     06/27/2024     06/27/2024    K 4.9 06/27/2024    K 4.9 06/27/2024     06/27/2024     06/27/2024    PROT 6.9 06/27/2024    PROT 6.9 06/27/2024    CO2 23 06/27/2024    CO2 23 06/27/2024    ANIONGAP 9 06/27/2024    ANIONGAP 10 06/27/2024    WBC 7.19 06/27/2024    RBC 4.77 06/27/2024    HGB 13.8 (L) 06/27/2024    HCT 42.5 06/27/2024    HCT 25 (L) 11/04/2023    MCV 89 06/27/2024    MCH 28.9 06/27/2024    MCHC 32.5 06/27/2024     Lab Results   Component Value Date    RDW 14.4 06/27/2024     06/27/2024    MPV 9.6 06/27/2024    GRAN 3.6 06/27/2024    GRAN 50.5 06/27/2024    LYMPH 2.4 06/27/2024    LYMPH 33.7 06/27/2024    MONO 0.6 06/27/2024    MONO 8.9 06/27/2024    EOSINOPHIL 5.7 06/27/2024    BASOPHIL 0.8 06/27/2024    EOS 0.4 06/27/2024    BASO 0.06 06/27/2024    APTT 26.2 11/05/2023    CHOL 173 06/27/2024    TRIG 87 06/27/2024    HDL 39 (L) 06/27/2024    CHOLHDL 22.5 06/27/2024    TOTALCHOLEST 4.4 06/27/2024    ALBUMIN 3.9 06/27/2024    ALBUMIN 3.9 06/27/2024    BILIDIR 0.1 06/27/2024    AST 14 06/27/2024    AST 13 06/27/2024    ALT 13 06/27/2024    ALT 13 06/27/2024    ALKPHOS 104 06/27/2024    ALKPHOS 103 06/27/2024    MG 1.9 11/21/2023    LABPROT 11.1 06/27/2024    INR 1.0 06/27/2024       Assessment and Plan:  Alberto Song is a 49 y.o. male with Alcohol-induced liver disease  Current recommendations:  Elevated liver tests:His liver function  has improved with abstinence. Recommend MELD labs and HCC screening every 6 months (abdo US now and ct scan 01/25 for pancrease cyst as recommended-see below). Does have significant fibrosis based on fibroscan. Suspect alcoholic liver disease as cause of previous liver dysfunction. May have element of MASH given hx of DM but liver tests are very low in the teens- thus not clear MASH is playing a significant role. Check liver function/meld labs every 24 weeks. Fibroscan to be repeated 03/25. Recommend vaccination for HAV.  Elevated iron sat and ferritin: Recommend repeat iron studies -may need MRE to r/o significant iron overload; note hem gene test is neg  Alcohol abuse: remain abstiment; pt understands that if he starts drinking again he would not be a candidate for liver transplant with ongoing alcohol abuse  Pancreatic lesion: likely due to pancreatitis. F/u with [facility] -ct scan 01/25 as recommended  Return 5 months

## 2024-08-21 ENCOUNTER — TELEPHONE (OUTPATIENT)
Dept: HEPATOLOGY | Facility: CLINIC | Age: 49
End: 2024-08-21
Payer: COMMERCIAL

## 2024-08-21 NOTE — TELEPHONE ENCOUNTER
Called the patient to schedule US per Dr. London.  Patient stated that he is going to call us back once he is ready to schedule.  Left call back # [phone].

## 2024-08-26 ENCOUNTER — PATIENT MESSAGE (OUTPATIENT)
Dept: DIABETES | Facility: CLINIC | Age: 49
End: 2024-08-26
Payer: COMMERCIAL

## 2024-08-26 ENCOUNTER — TELEPHONE (OUTPATIENT)
Dept: DIABETES | Facility: CLINIC | Age: 49
End: 2024-08-26
Payer: COMMERCIAL

## 2024-08-26 NOTE — PROGRESS NOTES
The patient location is: [place]   The chief complaint leading to consultation is: Diabetes management- follow up     Visit type: audiovisual    Face to Face time with patient: 25 minutes   30 minutes of total time spent on the encounter, which includes face to face time and non-face to face time preparing to see the patient (eg, review of tests), Obtaining and/or reviewing separately obtained history, Documenting clinical information in the electronic or other health record, Independently interpreting results (not separately reported) and communicating results to the patient/family/caregiver, or Care coordination (not separately reported).         Each patient to whom he or she provides medical services by telemedicine is:  (1) informed of the relationship between the physician and patient and the respective role of any other health care provider with respect to management of the patient; and (2) notified that he or she may decline to receive medical services by telemedicine and may withdraw from such care at any time.    Notes:       CC:   Chief Complaint   Patient presents with    Diabetes Mellitus       HPI: Alberto Song is a 49 y.o. male presents for a follow up visit today for the management of Diabetes   This is his first visit with me   He was seen at [facility]- but seen last in June 2023     He was diagnosed with Type 2 diabetes in January 2023  when he was admitted for alcoholic/gallstone pancreatitis underwent cholecystectomy -- he was sent home on insulin therapy     C-peptide 3.87 with  when seen outpatient in 2/2023.   C-peptide 2 -- 12/2023-- he was fasting at the but no glucose to compare -- he reports his BG was 290     Insulin antibody negative  JAMISON - +     Addendum--04/29/2024  C-peptide level 3.18--with a blood sugar of 311      Family hx of diabetes: denies   Hospitalized for diabetes: denies       No personal or FH of thyroid cancer or personal of pancreatic cancer    + personal Hx of  pancreatitis.   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 Recommend MELD labs and HCC screening every 6 months (abdo US now and ct scan 01/25 for pancrease cyst as recommended-see below). Does have significant fibrosis based on fibroscan. Suspect alcoholic liver disease as cause of previous liver dysfunction. May have element of MASH given hx of DM but liver tests are very low in the teens- thus not clear MASH is playing a significant role. Check liver function/meld labs every 24 weeks. Fibroscan to be repeated 03/25. Recommend vaccination for HAV.  Elevated iron sat and ferritin: Recommend repeat iron studies -may need MRE to r/o significant iron overload; note hem gene test is neg  Alcohol abuse: remain abstiment; pt understands that if he starts drinking again he would not be a candidate for liver transplant with ongoing alcohol abuse  Pancreatic lesion: likely due to pancreatitis. F/u with [facility] -ct scan 01/25 as recommended  Return 5 months      MRI- 12/27:  Impression:     Hepatomegaly and hepatic steatosis.  No suspicious focal hepatic lesion.     New mass along the tail of the pancreas.  In this patient with findings of acute pancreatitis on CT 01/05/2023, this could represent pseudocyst or splenic artery aneurysm.  Pancreatic mass is considered less likely but difficult to exclude.  Further evaluation with pancreas protocol CT is recommended.     Moderate splenomegaly.     This report was flagged in Epic as abnormal.    EUS 1/23/24: chronic pancreatitis, severe; 2.9 cm x 2.1 cm pancreatic tail lesion- s/p FNA-stromal fibrosis but no cancer; fatty liver       Our  last visit was in April of 2024  This was an in-person visit  At that visit we continued the metformin, continued the Jardiance, continue the Dexcom G6  We started him on the Omnipod 5 insulin pump with Humalog  Discussed using set doses with meals  He started the Omnipod 5 in May of 2024  He followed up with Sara at the end of May of 2024 and I reviewed his Dexcom and Omnipod downloads  His readings are doing well.  His insulin on board was changed to 3 hours and his target was changed to 110  His blood sugars have greatly improved since starting the insulin pump  At the end of June his A1c had improved to 8.1% from 9.3%  No recent A1c on file  See attached Dexcom and Omnipod download today    Had an A1c in [place] about 1 month ago and it was 7.7% reportedly     He plans to go back to [place] in stay there until January 2025    He is having prandial excursions at lunchtime.  See attached download  He feels that the insulin only last him 2 hours when he boluses                                     DIABETES COMPLICATIONS: none      Diabetes Management Status    ASA:  No    Statin: Not taking the Lipitor -- he has not stated that   ACE/ARB: Taking-- lisinopril 20 mg daily     Screening or Prevention Patient's value Goal Complete/Controlled?   HgA1C Testing and Control   Lab Results   Component Value Date    HGBA1C 8.1 (H) 06/27/2024      Annually/Less than 8% Yes   Lipid profile : 06/27/2024 Annually Yes   LDL control Lab Results   Component Value Date    LDLCALC 116.6 06/27/2024    Annually/Less than 100 mg/dl  No   Nephropathy screening Lab Results   Component Value Date    LABMICR <5.0 06/27/2024     Lab Results   Component Value Date    PROTEINUA Negative 11/10/2023    Annually Yes   Blood pressure BP Readings from Last 1 Encounters:   05/21/24 112/66    Less than 140/90 Yes   Dilated retinal exam : 11/08/2023 Annually Yes   Foot exam   : 03/04/2024 Annually Yes       CURRENT A1C:    Hemoglobin A1C   Date Value Ref  Range Status   06/27/2024 8.1 (H) 4.0 - 5.6 % Final     Comment:     ADA Screening Guidelines:  5.7-6.4%  Consistent with prediabetes  >or=6.5%  Consistent with diabetes    High levels of fetal hemoglobin interfere with the HbA1C  assay. Heterozygous hemoglobin variants (HbS, HgC, etc)do  not significantly interfere with this assay.   However, presence of multiple variants may affect accuracy.     04/01/2024 9.3 (H) 4.0 - 5.6 % Final     Comment:     ADA Screening Guidelines:  5.7-6.4%  Consistent with prediabetes  >or=6.5%  Consistent with diabetes    High levels of fetal hemoglobin interfere with the HbA1C  assay. Heterozygous hemoglobin variants (HbS, HgC, etc)do  not significantly interfere with this assay.   However, presence of multiple variants may affect accuracy.     11/04/2023 5.7 (H) 4.0 - 5.6 % Final     Comment:     ADA Screening Guidelines:  5.7-6.4%  Consistent with prediabetes  >or=6.5%  Consistent with diabetes    High levels of fetal hemoglobin interfere with the HbA1C  assay. Heterozygous hemoglobin variants (HbS, HgC, etc)do  not significantly interfere with this assay.   However, presence of multiple variants may affect accuracy.         GOAL A1C: 7%         DM MEDICATIONS USED IN THE PAST: Metformin   Jardiance   Levemir    Humalog/lispro   Dexcom G6   Omnipod 5         CURRENT DIABETES MEDICATIONS: Jardiance 25 mg daily   Metformin XR 1000 mg BID ( 4 tablets per day)   Omnipod 5 with Humalog     Insulin Pump:Omnipod 5 with Humalog   Pump settings:  Basal: 1 unit/hr   ICR: 1:10  ISF:1:45  Target: 110  IOB: 3 hours       Pump site change: q 3 days   Cartridge change: q 3 days  Insulin TDD: 26 units     Basal  59%   Bolus 41%     Auto mode - 84%       Back up Lantus/Levemir: yes - back up Levemir       Patient's pump was downloaded in clinic today and reviewed with patient.   Please see attached documents for pump download.       [facility]       BLOOD GLUCOSE MONITORING:    Sensor  type: Dexcom G6   Average BG readin  Time in range:79%   20% high   1% very high   0% low   <1% very low   Site change:  q10 days  GMI- 6.8%     2 weeks prior- average BG was 148   GMI- 6.8%     86% in range     Sensor was downloaded in clinic today and reviewed with patient.   Please see attached document for download.       Dexom supplies- from [employer]     HYPOGLYCEMIA: 1% low   0% very low    Glucagon kit: [employer]   Medic alert bracelet: denies         MEALS: eating 3 meals per day   Pasta   [place]   Breads   Eating   Drinks: ocean spray 5 nader drinks - SF   SF sodas   Water   Coffee - black        CURRENT EXERCISE:  No        Review of Systems  Review of Systems   Constitutional:  Negative for appetite change, fatigue and unexpected weight change.   HENT:  Negative for trouble swallowing.    Eyes:  Negative for visual disturbance.   Respiratory:  Negative for shortness of breath.    Cardiovascular:  Negative for chest pain.   Gastrointestinal:  Negative for nausea.   Endocrine: Negative for polydipsia, polyphagia and polyuria.   Genitourinary:         No Nocturia    Skin:  Negative for wound.   Neurological:  Positive for numbness (to feet).       Physical Exam   Physical Exam  Vitals and nursing note reviewed.   Constitutional:       General: He is not in acute distress.     Appearance: Normal appearance.   HENT:      Head: Normocephalic.      Nose: Nose normal.   Pulmonary:      Effort: Pulmonary effort is normal.   Neurological:      General: No focal deficit present.      Mental Status: He is alert and oriented to person, place, and time.   Psychiatric:         Mood and Affect: Mood normal.         Behavior: Behavior normal.         Thought Content: Thought content normal.         Judgment: Judgment normal.         FOOT EXAMINATION: Appropriate footwear         Lab Results   Component Value Date    TSH 1.810 2023           LESLYE (latent autoimmune diabetes of adulthood)  Readings are improving  on omnipod and dexcom download   +JAMISON but C-peptide WNL --- may have some underlying insulin def at times -- but already on insulin  Doing well on the Omnipod 5 and dexcom G6   May want to change to Dexcom G7 in the future         -- Medication Changes:   Continue Metformin XR 1000 mg 2 times per day     Continue Jardiance 25 mg daily     Continue dexcom G6     Continue the Omnipod 5 with  Humalog/lispro   Basal: 1 unit/hr   ICR: 1:10- increase bolus at lunch to 70-80  ISF: 1:45  Target 110  IOB 2.5 hours -- tightened       -- Reviewed goals of therapy are to get the best control we can without hypoglycemia.  -- Reviewed patient's current insulin regimen. Clarified proper insulin dose and timing in relation to meals, etc. Insulin injection sites and proper rotation instructed.    -- Advised frequent self blood glucose monitoring.  Patient encouraged to document glucose results and bring them to every clinic visit. Continue dexcom G6   -- Hypoglycemia precautions discussed. Instructed on precautions before driving.    -- Call for Bg repeatedly < 70 or > 180.   -- Close adherence to lifestyle changes recommended.   -- Periodic follow ups for eye evaluations, foot care and dental care suggested.      Patient has diabetes mellitus and manages diabetes with intensive insulin regimen and uses prandial and basal insulin daily  Patient requires a therapeutic CGM and is willing to use therapeutic CGM for the necessary frequent adjustments of insulin therapy.  I have completed an in-person visit during the previous 6 months and will continue to have in-person visits every 6 months to assess adherence to their CGM regimen and diabetes treatment plan.  Due to COVID pandemic and need for remote monitoring this tool is medically necessary        Primary hypertension  BP goal is < 140/90.   Tolerating ACEi  Blood pressure goals discussed with patient      History of pancreatitis  Avoiding DPP4 and GLP1   May affect insulin  production     Dyslipidemia, goal LDL below 100  On statin per ADA recommendations  LDL goal < 100.   LDL is above goal   Encouraged to start Lipitor         Insulin pump in place  See above for pump changes     Insulin pump fitting or adjustment  See above           I spent a total of  30 minutes on the day of the visit.This includes face to face time and non-face to face time preparing to see the patient (eg, review of tests), obtaining and/or reviewing separately obtained history, documenting clinical information in the electronic or other health record, independently interpreting results and communicating results to the patient/family/caregiver, or care coordinator.        Follow up in about 4 months (around 12/27/2024).  Follow up with me in 4-5 months. He is due for the labs when he can.         Orders Placed This Encounter   Procedures    Hemoglobin A1C     Standing Status:   Future     Standing Expiration Date:   2/27/2026    Comprehensive Metabolic Panel     Standing Status:   Future     Standing Expiration Date:   2/27/2026    Lipid Panel     Standing Status:   Future     Standing Expiration Date:   2/27/2026    TSH     Standing Status:   Future     Standing Expiration Date:   2/27/2026       Recommendations were discussed with the patient in detail  The patient verbalized understanding and agrees with the plan outlined as above.     This note was partly generated with [employer] voice recognition software. I apologize for any possible typographical errors.

## 2024-08-27 ENCOUNTER — OFFICE VISIT (OUTPATIENT)
Dept: DIABETES | Facility: CLINIC | Age: 49
End: 2024-08-27
Payer: COMMERCIAL

## 2024-08-27 ENCOUNTER — PATIENT MESSAGE (OUTPATIENT)
Dept: DIABETES | Facility: CLINIC | Age: 49
End: 2024-08-27

## 2024-08-27 VITALS — WEIGHT: 205 LBS | BODY MASS INDEX: 24.96 KG/M2 | HEIGHT: 76 IN

## 2024-08-27 DIAGNOSIS — Z87.19 HISTORY OF PANCREATITIS: ICD-10-CM

## 2024-08-27 DIAGNOSIS — E78.5 DYSLIPIDEMIA, GOAL LDL BELOW 100: ICD-10-CM

## 2024-08-27 DIAGNOSIS — I10 PRIMARY HYPERTENSION: ICD-10-CM

## 2024-08-27 DIAGNOSIS — Z96.41 INSULIN PUMP IN PLACE: ICD-10-CM

## 2024-08-27 DIAGNOSIS — Z46.81 INSULIN PUMP FITTING OR ADJUSTMENT: ICD-10-CM

## 2024-08-27 DIAGNOSIS — E13.9 LADA (LATENT AUTOIMMUNE DIABETES OF ADULTHOOD): Primary | ICD-10-CM

## 2024-08-27 NOTE — ASSESSMENT & PLAN NOTE
Readings are improving on omnipod and dexcom download   +JAMISON but C-peptide WNL --- may have some underlying insulin def at times -- but already on insulin  Doing well on the Omnipod 5 and dexcom G6   May want to change to Dexcom G7 in the future         -- Medication Changes:   Continue Metformin XR 1000 mg 2 times per day     Continue Jardiance 25 mg daily     Continue dexcom G6     Continue the Omnipod 5 with  Humalog/lispro   Basal: 1 unit/hr   ICR: 1:10- increase bolus at lunch to 70-80  ISF: 1:45  Target 110  IOB 2.5 hours -- tightened       -- Reviewed goals of therapy are to get the best control we can without hypoglycemia.  -- Reviewed patient's current insulin regimen. Clarified proper insulin dose and timing in relation to meals, etc. Insulin injection sites and proper rotation instructed.    -- Advised frequent self blood glucose monitoring.  Patient encouraged to document glucose results and bring them to every clinic visit. Continue dexcom G6   -- Hypoglycemia precautions discussed. Instructed on precautions before driving.    -- Call for Bg repeatedly < 70 or > 180.   -- Close adherence to lifestyle changes recommended.   -- Periodic follow ups for eye evaluations, foot care and dental care suggested.      Patient has diabetes mellitus and manages diabetes with intensive insulin regimen and uses prandial and basal insulin daily  Patient requires a therapeutic CGM and is willing to use therapeutic CGM for the necessary frequent adjustments of insulin therapy.  I have completed an in-person visit during the previous 6 months and will continue to have in-person visits every 6 months to assess adherence to their CGM regimen and diabetes treatment plan.  Due to COVID pandemic and need for remote monitoring this tool is medically necessary

## 2024-08-27 NOTE — ASSESSMENT & PLAN NOTE
On statin per ADA recommendations  LDL goal < 100.   LDL is above goal   Encouraged to start Lipitor

## 2024-08-28 ENCOUNTER — PATIENT MESSAGE (OUTPATIENT)
Dept: DIABETES | Facility: CLINIC | Age: 49
End: 2024-08-28
Payer: COMMERCIAL

## 2024-09-03 ENCOUNTER — TELEPHONE (OUTPATIENT)
Dept: HEPATOLOGY | Facility: CLINIC | Age: 49
End: 2024-09-03
Payer: COMMERCIAL

## 2024-09-03 NOTE — TELEPHONE ENCOUNTER
----- Message from Harini London MD sent at 8/20/2024  3:08 PM CDT -----  1. External order for carlos DUARTE- to be done now  2. Labs every 6 months - due 01/25  Fibroscan 03/25  Return 01/25 vs 02/25

## 2024-09-19 ENCOUNTER — PATIENT MESSAGE (OUTPATIENT)
Dept: PRIMARY CARE CLINIC | Facility: CLINIC | Age: 49
End: 2024-09-19
Payer: COMMERCIAL

## 2024-09-25 ENCOUNTER — PATIENT MESSAGE (OUTPATIENT)
Dept: DIABETES | Facility: CLINIC | Age: 49
End: 2024-09-25
Payer: COMMERCIAL

## 2024-10-15 ENCOUNTER — PATIENT MESSAGE (OUTPATIENT)
Dept: DIABETES | Facility: CLINIC | Age: 49
End: 2024-10-15
Payer: COMMERCIAL

## 2024-10-28 ENCOUNTER — PATIENT MESSAGE (OUTPATIENT)
Dept: PRIMARY CARE CLINIC | Facility: CLINIC | Age: 49
End: 2024-10-28
Payer: COMMERCIAL

## 2024-11-11 ENCOUNTER — TELEPHONE (OUTPATIENT)
Dept: HEPATOLOGY | Facility: CLINIC | Age: 49
End: 2024-11-11
Payer: COMMERCIAL

## 2024-11-11 NOTE — TELEPHONE ENCOUNTER
The pt stated that he will be out of town in January, rescheduled fibroscan appt per the pt's request.

## 2024-11-11 NOTE — TELEPHONE ENCOUNTER
----- Message from Frank sent at 11/11/2024  8:17 AM CST -----  Regarding: Reschedule Existing Appointment  Contact: [phone]  Reschedule Existing Appointment     Current appt date: 1/27/2025     Type of appt : Fibroscan     Physician: Dr. London     Reason for rescheduling: Pt was trying to change appt to December     Caller: Alberto Song     Contact Preference:  [phone]

## 2024-12-03 ENCOUNTER — TELEPHONE (OUTPATIENT)
Dept: HEPATOLOGY | Facility: CLINIC | Age: 49
End: 2024-12-03
Payer: COMMERCIAL

## 2024-12-03 NOTE — TELEPHONE ENCOUNTER
----- Message from Alda sent at 12/3/2024  8:02 AM CST -----  Contact: [phone]    ----- Message -----  From: Christiana Duke MA  Sent: 12/3/2024   7:02 AM CST  To: Alda Mirza    Please send it to the staff  ----- Message -----  From: Alda Zavaleta  Sent: 12/2/2024   4:56 PM CST  To: [facility] Hepatology Scheduling    .[email or web address]     Patient is calling for Medical Advice regarding: Patient is requesting a call back from the staff regarding reschedule the FIBROSCAN    How long has patient had these symptoms:    Pharmacy name and phone#:    Patient wants a call back or thru [facility]: call back     Comments:    Please advise patient replies from provider may take up to 48 hours.  
Spoke with pt and rescheduled fibroscan appt.  
No

## 2024-12-09 ENCOUNTER — PROCEDURE VISIT (OUTPATIENT)
Dept: HEPATOLOGY | Facility: CLINIC | Age: 49
End: 2024-12-09
Payer: COMMERCIAL

## 2024-12-09 ENCOUNTER — PATIENT MESSAGE (OUTPATIENT)
Dept: HEPATOLOGY | Facility: CLINIC | Age: 49
End: 2024-12-09
Payer: COMMERCIAL

## 2024-12-09 DIAGNOSIS — Z79.4 TYPE 2 DIABETES MELLITUS WITH HYPERGLYCEMIA, WITH LONG-TERM CURRENT USE OF INSULIN: ICD-10-CM

## 2024-12-09 DIAGNOSIS — K75.81 STEATOHEPATITIS: ICD-10-CM

## 2024-12-09 DIAGNOSIS — E11.65 TYPE 2 DIABETES MELLITUS WITH HYPERGLYCEMIA, WITH LONG-TERM CURRENT USE OF INSULIN: ICD-10-CM

## 2024-12-09 PROCEDURE — 91200 LIVER ELASTOGRAPHY: CPT | Mod: S$GLB,,, | Performed by: INTERNAL MEDICINE

## 2024-12-09 RX ORDER — BLOOD-GLUCOSE SENSOR
EACH MISCELLANEOUS
Qty: 9 EACH | Refills: 1 | Status: SHIPPED | OUTPATIENT
Start: 2024-12-09 | End: 2024-12-10 | Stop reason: SDUPTHER

## 2024-12-09 NOTE — TELEPHONE ENCOUNTER
----- Message from Lexi sent at 12/9/2024  3:27 PM CST -----  Type:  RX Refill Request    Who Called:  Pt  Refill or New Rx: Refill  RX Name and Strength: blood-glucose sensor (DEXCOM G6 SENSOR) Deisi,   How is the patient currently taking it? (ex. 1XDay):  Is this a 30 day or 90 day RX:  Preferred Pharmacy with phone number: [facility], [phone]  Local or Mail Order: Mail  Ordering Provider: Dr. Tan  Would the patient rather a call back or a response via [facility]? Call  Best Call Back Number:  [phone]  Additional Information: Pt states he will be leaving for out of town and will need an urgent prescription order request submitted to pharmacy refill.  Pt would like to speak to someone in office to verify receipt of prescription request and forward to Pharmacy.

## 2024-12-10 ENCOUNTER — PATIENT MESSAGE (OUTPATIENT)
Dept: HEPATOLOGY | Facility: CLINIC | Age: 49
End: 2024-12-10
Payer: COMMERCIAL

## 2024-12-10 DIAGNOSIS — E11.65 TYPE 2 DIABETES MELLITUS WITH HYPERGLYCEMIA, WITH LONG-TERM CURRENT USE OF INSULIN: ICD-10-CM

## 2024-12-10 DIAGNOSIS — Z79.4 TYPE 2 DIABETES MELLITUS WITH HYPERGLYCEMIA, WITH LONG-TERM CURRENT USE OF INSULIN: ICD-10-CM

## 2024-12-10 RX ORDER — BLOOD-GLUCOSE SENSOR
EACH MISCELLANEOUS
Qty: 9 EACH | Refills: 1 | Status: SHIPPED | OUTPATIENT
Start: 2024-12-10

## 2024-12-10 RX ORDER — BLOOD-GLUCOSE TRANSMITTER
EACH MISCELLANEOUS
Qty: 1 EACH | Refills: 4 | Status: SHIPPED | OUTPATIENT
Start: 2024-12-10

## 2024-12-11 NOTE — PROCEDURES
FibroScan Transplant Hepatology    Date/Time: 12/9/2024 8:45 AM    Performed by: Harini London MD  Authorized by: Harini London MD    Diagnosis:  NAFLD    Probe:  M    Universal Protocol: Patient's identity, procedure and site were verified, confirmatory pause was performed.  Discussed procedure including risks and potential complications.  Questions answered.  Patient verbalizes understanding and wishes to proceed with VCTE.     Procedure: After providing explanations of the procedure, patient was placed in the supine position with right arm in maximum abduction to allow optimal exposure of right lateral abdomen.  Patient was briefly assessed, Testing was performed in the mid-axillary location, 50Hz Shear Wave pulses were applied and the resulting Shear Wave and Propagation Speed detected with a 3.5 MHz ultrasonic signal, using the FibroScan probe, Skin to liver capsule distance and liver parenchyma were accessed during the entire examination with the FibroScan probe, Patient was instructed to breathe normally and to abstain from sudden movements during the procedure, allowing for random measurements of liver stiffness. At least 10 Shear Waves were produced, Individual measurements of each Shear Wave were calculated.  Patient tolerated the procedure well with no complications.  Meets discharge criteria as was dismissed.  Rates pain 0 out of 10.  Patient will follow up with ordering provider to review results.    Findings  Median liver stiffness score:  8.4  CAP Reading: dB/m:  193    IQR/med %:  9  Interpretation  Fibrosis interpretation is based on medial liver stiffness - Kilopascal (kPa).    Fibrosis Stage:  F2  Steatosis interpretation is based on controlled attenuation parameter - (dB/m).    Steatosis Grade:  <S1  Comments/Plan:  <11% steatosis

## 2025-03-07 ENCOUNTER — TELEPHONE (OUTPATIENT)
Dept: DIABETES | Facility: CLINIC | Age: 50
End: 2025-03-07
Payer: COMMERCIAL

## 2025-08-11 ENCOUNTER — TELEPHONE (OUTPATIENT)
Dept: DIABETES | Facility: CLINIC | Age: 50
End: 2025-08-11
Payer: COMMERCIAL

## 2025-08-13 ENCOUNTER — TELEPHONE (OUTPATIENT)
Dept: DIABETES | Facility: CLINIC | Age: 50
End: 2025-08-13
Payer: COMMERCIAL

## 2025-08-13 DIAGNOSIS — Z79.4 TYPE 2 DIABETES MELLITUS WITH HYPERGLYCEMIA, WITH LONG-TERM CURRENT USE OF INSULIN: ICD-10-CM

## 2025-08-13 DIAGNOSIS — E11.65 TYPE 2 DIABETES MELLITUS WITH HYPERGLYCEMIA, WITH LONG-TERM CURRENT USE OF INSULIN: ICD-10-CM

## 2025-08-13 RX ORDER — BLOOD-GLUCOSE SENSOR
EACH MISCELLANEOUS
Qty: 9 EACH | Refills: 1 | OUTPATIENT
Start: 2025-08-13

## 2025-08-13 RX ORDER — INSULIN PMP CART,AUT,G6/7,CNTR
1 EACH SUBCUTANEOUS
Qty: 9 EACH | Refills: 0 | Status: SHIPPED | OUTPATIENT
Start: 2025-08-13

## 2025-08-13 RX ORDER — INSULIN PMP CART,AUT,G6/7,CNTR
EACH SUBCUTANEOUS
OUTPATIENT
Start: 2025-08-13

## 2025-08-13 RX ORDER — BLOOD-GLUCOSE TRANSMITTER
EACH MISCELLANEOUS
Qty: 1 EACH | Refills: 0 | Status: SHIPPED | OUTPATIENT
Start: 2025-08-13

## 2025-08-13 RX ORDER — BLOOD-GLUCOSE SENSOR
EACH MISCELLANEOUS
Qty: 9 EACH | Refills: 0 | Status: SHIPPED | OUTPATIENT
Start: 2025-08-13

## 2025-08-13 RX ORDER — BLOOD-GLUCOSE TRANSMITTER
EACH MISCELLANEOUS
Qty: 1 EACH | Refills: 4 | OUTPATIENT
Start: 2025-08-13